# Patient Record
Sex: FEMALE | Race: WHITE | NOT HISPANIC OR LATINO | Employment: PART TIME | URBAN - NONMETROPOLITAN AREA
[De-identification: names, ages, dates, MRNs, and addresses within clinical notes are randomized per-mention and may not be internally consistent; named-entity substitution may affect disease eponyms.]

---

## 2022-04-07 ENCOUNTER — LAB (OUTPATIENT)
Dept: LAB | Facility: HOSPITAL | Age: 37
End: 2022-04-07

## 2022-04-07 ENCOUNTER — OFFICE VISIT (OUTPATIENT)
Dept: PSYCHIATRY | Facility: CLINIC | Age: 37
End: 2022-04-07

## 2022-04-07 DIAGNOSIS — F15.20 METHAMPHETAMINE USE DISORDER, SEVERE, DEPENDENCE: ICD-10-CM

## 2022-04-07 DIAGNOSIS — F11.20 OPIOID USE DISORDER, SEVERE, DEPENDENCE: Primary | ICD-10-CM

## 2022-04-07 DIAGNOSIS — F15.20 METHAMPHETAMINE USE DISORDER, SEVERE, DEPENDENCE: Primary | ICD-10-CM

## 2022-04-07 DIAGNOSIS — F11.20 OPIOID USE DISORDER, SEVERE, DEPENDENCE: ICD-10-CM

## 2022-04-07 LAB
AMPHET+METHAMPHET UR QL: NEGATIVE
AMPHETAMINES UR QL: NEGATIVE
BARBITURATES UR QL SCN: NEGATIVE
BENZODIAZ UR QL SCN: NEGATIVE
BUPRENORPHINE SERPL-MCNC: NEGATIVE NG/ML
CANNABINOIDS SERPL QL: NEGATIVE
COCAINE UR QL: NEGATIVE
METHADONE UR QL SCN: POSITIVE
OPIATES UR QL: NEGATIVE
OXYCODONE UR QL SCN: NEGATIVE
PCP UR QL SCN: NEGATIVE
PROPOXYPH UR QL: NEGATIVE
TRICYCLICS UR QL SCN: NEGATIVE

## 2022-04-07 PROCEDURE — G0480 DRUG TEST DEF 1-7 CLASSES: HCPCS

## 2022-04-07 PROCEDURE — 80307 DRUG TEST PRSMV CHEM ANLYZR: CPT

## 2022-04-07 PROCEDURE — 80306 DRUG TEST PRSMV INSTRMNT: CPT

## 2022-04-08 LAB — ETHANOL UR-MCNC: NEGATIVE %

## 2022-04-11 ENCOUNTER — TELEPHONE (OUTPATIENT)
Dept: PSYCHIATRY | Facility: CLINIC | Age: 37
End: 2022-04-11

## 2022-04-11 ENCOUNTER — OFFICE VISIT (OUTPATIENT)
Dept: PSYCHIATRY | Facility: HOSPITAL | Age: 37
End: 2022-04-11

## 2022-04-11 DIAGNOSIS — F15.20 METHAMPHETAMINE USE DISORDER, SEVERE, DEPENDENCE: ICD-10-CM

## 2022-04-11 DIAGNOSIS — F11.20 OPIOID USE DISORDER, SEVERE, DEPENDENCE: Primary | ICD-10-CM

## 2022-04-11 PROCEDURE — H0015 ALCOHOL AND/OR DRUG SERVICES: HCPCS | Performed by: NURSE PRACTITIONER

## 2022-04-11 NOTE — PROGRESS NOTES
"  IDENTIFYING INFORMATION:   Ada Vogt, is a 37 y.o. female presents today for an initial IOP assessment and initial with SAM Panda at [facility]. Pt currently resides in [place], [place] and lives with a friend and friend's brother.  Pt currently works part time at [employer] in [facility] and graduated high school and some college level of education.  Pt is ordered for IOP tx by her DCBS worker Dawson Carlee out of [facility]. Patient states motivation for treatment is “get my baby back” and start better life. Patient during initial assessment reported that her sober support system is her sponsor, Mari.     Name of PCP: Patient reports Abbey Kamara (patient reports has not visited in a \"long time\").   Referral source: Patient reports DCBS worker Dawson Carlee out of [facility].     HPI, ONSET, DURATION, COURSE:    Assessment completed 4/7/2022  Patient in office for CD-IOP initial assessment on 4/7/2022. Patient reports that she was referred for services by her DCBS worker, Dawson Carlee, who is out of [facility]. Patient reports that DCBS got involved 11/10/2021. Patient reports that initially, her ex-sister in law called to DCBS due to a relapse (patient stated had never stopped) and they charged her with Dependency. Patient states DCBS placed her with [facility] in [facility] and she started with this program in Mid-November. Patient states she completed 12 classes of IOP at [facility]. Patient stated that they kicked her out on 3/10/2022, but patient states she does not know why. Patient stated a friend also called DCBS on her when she relapsed.     Patient states she is currently staying with a friend in [place]. Patient states that it is just the two of them, and her friend’s brother. Patient stated there is no kids in the home. Patient stated that she has one child (age 1 year old) who is currently staying with a cousin. Patient stated she has " supervised visits. Patient states child is well taken care of. Patient states she is working at [employer] in [facility]. Patient states she works part-time. Patient states she has an  license. Patient stated a friend is helping with transportation (clinician discussed transportation options with patient during assessment). Patient stated she graduated high school and has some college. Patient states motivation for treatment is “get my baby back” and start better life. Patient during initial assessment reported that her sober support system is her sponsor, Mari.     In discussing previous treatment history, patient reports she was going to [facility] from mid-November until 3/10/2022 when they kicked her out of IOP program. Patient reports she also did counseling at [facility]. Patient denied any other counseling. Patient denied history of mental health hospitalizations. Patient reports on 3/25/2022, she started [facility] in [place] where she is visiting the methadone clinic. Patient reports she is prescribed 20 mg of methadone per day. Patient reports she will be starting at [facility] on 2022 for individual counseling. Patient during assessment reports she is actively going to CR meetings.     Patient during assessment denied medical conditions. Patient during assessment denied history of seizures. Patient reports the only medication she is on currently is Methadone. Patient reports she was prescribed Wellbutrin at [facility], but patient states she did not start this medication. Patient reports she has prescription but has not picked it up yet. Clinician recommended to patient discussing medications with doctor or nurse practitioner.     Patient during assessment rated her depression as a 6-7 on a 1:10 scale (1-low), and stated she has felt “pretty depressed but couldn’t cross that line”. Patient during assessment rated her anxiety as an 8-9 on a 1:10 scale (1-low). Patient during  assessment answered yes to feelings of hopelessness and rated this as a 6 on a 1:10 scale (1-low). Patient reports sleep is “poor but excessive” and stated that it is “not restful” and described having “vivid dreams”. Patient reports appetite is “way better” and reports she is overeating.     Patient during assessment denied current suicidal thoughts. Patient during assessment denied current plan or intent to hurt self. Patient during assessment denied history of suicidal thoughts. Patient during assessment denied history of plan or intent to hurt self. Patient during assessment denied history of suicide attempts or self-harm.     Patient during assessment denied current or history of homicidal thoughts or plan or intent to hurt others. Patient then stated she wanted to punch her ex-roommate when she called CPS, but patient denied current plan or intent to hurt roommate or anyone else. Patient during assessment denied history of violence.     Patient during assessment denied history of hallucinations, but stated she has “vivid dreams”.     Patient during assessment denied history of trauma or abuse.     In discussing legal history, patient reports at age 23, she was charged with Hindering. Patient also reports she was charged with Dependency in November 2021 and has active DCBS case.     Patient during assessment discussed substance use history. Patient reports history of nicotine use. Patient reports age at first use was age 24. Patient reports she currently smokes 1 pack per day.     Patient reports she started using meth as age 25. Patient reports route of ingestion was first smoking then snorting. Patient reports she started using “sporadically”, then progressed to regular use. Patient reports she has used meth for 12 years. Patient reports she has used daily from March 2021 until two weeks ago. Patient reports last meth use was two weeks ago.     Patient reports history of marijuana use. Patient reports age at  first use of marijuana was age 25. Patient reports she smoked daily for a year. Patient reports last marijuana use was 7 years ago.     Patient reports history of pain pill use (Hydrocodone and Percocet). Patient reports she was first using for pain relief and then she became addicted. Patient reports route of ingestion was snorting. Patient reports she used for 12 years, with last use being two weeks ago. Patient reports she was using 5 Percocet 30 per day.     Patient reports history of alcohol use. Patient reports age at first use was age 20. Patient reports she drinks alcohol on weekends, and reports she drinks around a fifth weekly. Patient reports last alcohol use was 3 months ago.     Patient reports she is currently prescribed 20 mg of methadone per day. Patient reports she receives daily doses. Patient reports last use was date of session.     Patient during assessment discussed that her child’s father passed away in July 2021 and this made substance use worse, and patient described using meth to numb the pain.     Patient during assessment denied active withdrawal symptoms. Patient reports she did have withdrawal symptoms a couple of weeks ago. When asked about current cravings, patient stated she has not had any since starting methadone, but reports she does think about it occasionally. Patient reports two weeks is the longest she has been completely clean.     Substance use disorder diagnostic criteria verbally reviewed with patient during assessment. Patient met 10 of 11 criteria for methamphetamine use disorder, and 6 of 11 criteria for opioid use disorder based on her self-report. Patient met 0 of 11 criteria for cannabis use disorder based on her self-report. Therefore, diagnoses of Methamphetamine Use Disorder, Severe, Dependence and Opioid Use Disorder, Severe, Dependence listed.     Patient during assessment reports family history of substance use. Patient reports her grandfather on mother’s side  of family was an alcoholic and reports that her brother (who is currently in halfway) has substance use history.     Patient reports family history of mental health with patient reporting “something mentally wrong” with her mother, but patient stated she did not know what this was.     Confidentiality and reporting requirements verbally explained to patient during assessment and patient verbally agreed.     Safety plan of report to police or hospital, or call 911 if feeling unsafe, if having suicidal or homicidal thoughts, or if in emergency need of medications verbally reviewed with patient during assessment and suicide prevention hotline number verbally provided to patient during assessment, patient during assessment verbally agreed to safety plan.     Patient discussed case management needs for housing, clinician emailed  Adali at [facility].     Patient agreed for CD-IOP start date of Monday 4/11/2022.        Previous Psychiatric History    History of prior treatment or hospitalization: In discussing previous treatment history, patient reports she was going to [facility] from mid-November until 3/10/2022 when they kicked her out of IOP program. Patient reports she also did counseling at [facility]. Patient denied any other counseling. Patient denied history of mental health hospitalizations. Patient reports on 3/25/2022, she started [facility] in [place] where she is visiting the methadone clinic. Patient reports she is prescribed 20 mg of methadone per day. Patient reports she will be starting at [facility] on 4/11/2022 for individual counseling. Patient during assessment reports she is actively going to CR meetings.     History of use of psychotropics: Patient reports the only medication she is on currently is Methadone. Patient reports she was prescribed Wellbutrin at [facility], but patient states she did not start this medication. Patient reports she has prescription but  has not picked it up yet. Clinician recommended to patient discussing medications with doctor or nurse practitioner.     History of suicide attempts:  Patient during assessment denied history of suicide attempts or self-harm.     History of violence: Patient during assessment denied current or history of homicidal thoughts or plan or intent to hurt others. Patient then stated she wanted to punch her ex-roommate when she called CPS, but patient denied current plan or intent to hurt roommate or anyone else. Patient during assessment denied history of violence.     Family Psychiatric History:    Family history is significant for psychiatric issues: Patient reports family history of mental health with patient reporting “something mentally wrong” with her mother, but patient stated she did not know what this was.     Family history is significant for substance abuse issues:  Patient during assessment reports family history of substance use. Patient reports her grandfather on mother’s side of family was an alcoholic and reports that her brother (who is currently in assisted) has substance use history.     Life Events/Trauma History  Patient during assessment denied history of trauma or abuse.     Medical History    I have reviewed this patient's past medical history.    Are there any significant health issues (current or past): Patient during assessment denied medical conditions.     History of seizures: Patient during assessment denied history of seizures.    No family history on file.    Current Medications:   No current outpatient medications on file.     No current facility-administered medications for this visit.       Relational History    Difficulty getting along with peers: no  Difficulty making new friendships: no  Difficulty maintaining friendships: no  Close with family members: no    Legal History  In discussing legal history, patient reports at age 23, she was charged with Hindering. Patient also reports she was  charged with Dependency in November 2021 and has active DCBS case.       SUBSTANCE ABUSE HISTORY:  Patient during assessment discussed substance use history. Patient reports history of nicotine use. Patient reports age at first use was age 24. Patient reports she currently smokes 1 pack per day.     Patient reports she started using meth as age 25. Patient reports route of ingestion was first smoking then snorting. Patient reports she started using “sporadically”, then progressed to regular use. Patient reports she has used meth for 12 years. Patient reports she has used daily from March 2021 until two weeks ago. Patient reports last meth use was two weeks ago.     Patient reports history of marijuana use. Patient reports age at first use of marijuana was age 25. Patient reports she smoked daily for a year. Patient reports last marijuana use was 7 years ago.     Patient reports history of pain pill use (Hydrocodone and Percocet). Patient reports she was first using for pain relief and then she became addicted. Patient reports route of ingestion was snorting. Patient reports she used for 12 years, with last use being two weeks ago. Patient reports she was using 5 Percocet 30 per day.     Patient reports history of alcohol use. Patient reports age at first use was age 20. Patient reports she drinks alcohol on weekends, and reports she drinks around a fifth weekly. Patient reports last alcohol use was 3 months ago.     Patient reports she is currently prescribed 20 mg of methadone per day. Patient reports she receives daily doses. Patient reports last use was date of session.     Patient during assessment discussed that her child’s father passed away in July 2021 and this made substance use worse, and patient described using meth to numb the pain.     Patient during assessment denied active withdrawal symptoms. Patient reports she did have withdrawal symptoms a couple of weeks ago. When asked about current cravings,  patient stated she has not had any since starting methadone, but reports she does think about it occasionally. Patient reports two weeks is the longest she has been completely clean.    Substance use disorder diagnostic criteria verbally reviewed with patient during assessment. Patient met 10 of 11 criteria for methamphetamine use disorder, and 6 of 11 criteria for opioid use disorder based on her self-report. Patient met 0 of 11 criteria for cannabis use disorder based on her self-report. Therefore, diagnoses of Methamphetamine Use Disorder, Severe, Dependence and Opioid Use Disorder, Severe, Dependence listed.       Substance Present Use Age 1st use Route Amount   (How Much) Frequency  (How Often) How Long at this Rate Last use   Nicotine Y Patient reports age 24  Patient reports 1 pack per day.    Patient reports date of assessment.    Alcohol N Patient reports age 20.  Patient reports 1/5 weekly.  Patient reports on weekends.   Patient reports 3 months ago.    Marijuana N Patient reports age 25.   Patient reports she smoked daily for a year.   Patient reports 7 years ago.    Benzos:  (type)          Neurontin          Pain Pills:  (type): Hydrocode and Percocet N Patient reports age 25.  Patient reports snorting.   Patient reports daily use, patient reports using 5 percocet 30 per day.  Patient reports she used for 12 years.  Patient reports two weeks ago.    Cocaine          Meth N Patient reports age 25.  Patient reports smoking then snorting.   Patient reports daily.   Patient reports two weeks ago.    Heroin          Methadone Y    Patient reports she is prescribed 20 mg per day.      Suboxone          Synthetics or other:              History of DT's: Unknown to clinician.                     History of Seizures: . Patient during assessment denied history of seizures.     WITHDRAWAL SYMPTOMS: Patient during assessment denied active withdrawal symptoms. Patient reports she did have withdrawal symptoms a  couple of weeks ago.       Cravings: When asked about current cravings, patient stated she has not had any since starting methadone, but reports she does think about it occasionally.       Personal Assessment 0-10 Scale (10 worst)    Anxiety: Patient during assessment rated her anxiety as an 8-9 on a 1:10 scale (1-low).     Depression: Patient during assessment rated her depression as a 6-7 on a 1:10 scale (1-low), and stated she has felt “pretty depressed but couldn’t cross that line”.     Patient adamantly and convincingly denies current suicidal or homicidal ideation or perceptual disturbance. Yes: Patient during assessment denied current suicidal thoughts. Patient during assessment denied current plan or intent to hurt self. Patient during assessment denied history of suicidal thoughts. Patient during assessment denied history of plan or intent to hurt self. Patient during assessment denied history of suicide attempts or self-harm.     Patient during assessment denied current or history of homicidal thoughts or plan or intent to hurt others. Patient then stated she wanted to punch her ex-roommate when she called CPS, but patient denied current plan or intent to hurt roommate or anyone else. Patient during assessment denied history of violence.     Patient during assessment denied history of hallucinations, but stated she has “vivid dreams”.       Urine drug screen today was presumptively positive for: Methamphetamine.     MSE:     Affect: Appropriate  Cooperation: Cooperative  Delusion: None  Eye Contact: Good  Hallucinations: None  Homicidal: None  Suicidal: None  Cognition: Good  Memory: Intact  Orientation: Person, Place, Time and Situation  Psychomotor Behaviors: Appropriate  Speech: Normal  Though Content: Normal  Thought Progress: Linear  Hopelessness: Patient during assessment answered yes to feelings of hopelessness and rated this as a 6 on a 1:10 scale (1-low).   Reliability: fair  Insight:  Fair  Judgement: Fair  Impulse Control: Fair  Physical/Medical Issues: Patient during assessment denied medical conditions.     Patient resources/assets:  Employed and participated in Self-Help Groups    ASAM Dimensions:  I.    Intoxication/Withdrawal:  0  (Patient during initial assessment denied active withdrawal symptoms).  II.   Medical Conditions/Complications:  0 (Patient during initial assessment denied medical conditions).  III.  Behavioral/Emotional/Cognitive: 1 (Due to patient report of depression and anxiety during initial assessment).  IV.  Readiness to Change: 1 (Patient is ordered for treatment by DCBS, but patient identified motivation for change during initial assessment).  V.   Relapse Risk: 2 (Due to patient report of recent substance use and report of two weeks longest being clean during initial assessment).  VI:  Recovery Environment: 1 (Patient reports she lives with a friend but reporting needs housing assistance and patient reports friend helping with transportation during initial assessment).  Total ASAM Score = 05     BASELINE SCORES 04/07/2022       KATHY-7   (16)                  PHQ-9   (20)       Impression/Formulation:    VISIT DIAGNOSIS:     ICD-10-CM ICD-9-CM   1. Methamphetamine use disorder, severe, dependence (HCC)  F15.20 304.40   2. Opioid use disorder, severe, dependence (HCC)  F11.20 304.00        Patient appeared alert and oriented. Patient states motivation for treatment is “get my baby back” and start better life. Patient during initial assessment reported that her sober support system is her sponsor, Mari.       Plan:  Confidentiality and reporting requirements verbally explained to patient during assessment and patient verbally agreed.     Safety plan of report to police or hospital, or call 911 if feeling unsafe, if having suicidal or homicidal thoughts, or if in emergency need of medications verbally reviewed with patient during assessment and suicide prevention  hotline number verbally provided to patient during assessment, patient during assessment verbally agreed to safety plan.     Patient discussed case management needs for housing, clinician emailed  Adali at [facility].     Patient agreed for CD-IOP start date of Monday 4/11/2022.    -DARREN Panda, CSW

## 2022-04-12 ENCOUNTER — OFFICE VISIT (OUTPATIENT)
Dept: PSYCHIATRY | Facility: CLINIC | Age: 37
End: 2022-04-12

## 2022-04-12 ENCOUNTER — LAB (OUTPATIENT)
Dept: LAB | Facility: HOSPITAL | Age: 37
End: 2022-04-12

## 2022-04-12 VITALS
WEIGHT: 93.6 LBS | HEART RATE: 87 BPM | HEIGHT: 61 IN | SYSTOLIC BLOOD PRESSURE: 102 MMHG | BODY MASS INDEX: 17.67 KG/M2 | DIASTOLIC BLOOD PRESSURE: 78 MMHG

## 2022-04-12 DIAGNOSIS — F11.21 OPIOID USE DISORDER, SEVERE, IN EARLY REMISSION, ON MAINTENANCE THERAPY, DEPENDENCE (HCC): Primary | ICD-10-CM

## 2022-04-12 DIAGNOSIS — F41.1 GENERALIZED ANXIETY DISORDER: ICD-10-CM

## 2022-04-12 DIAGNOSIS — F11.20 OPIOID USE DISORDER, SEVERE, DEPENDENCE: ICD-10-CM

## 2022-04-12 DIAGNOSIS — F15.21 METHAMPHETAMINE USE DISORDER, SEVERE, IN EARLY REMISSION: ICD-10-CM

## 2022-04-12 DIAGNOSIS — F32.9 REACTIVE DEPRESSION: ICD-10-CM

## 2022-04-12 DIAGNOSIS — F15.20 METHAMPHETAMINE USE DISORDER, SEVERE, DEPENDENCE: ICD-10-CM

## 2022-04-12 PROCEDURE — G0480 DRUG TEST DEF 1-7 CLASSES: HCPCS

## 2022-04-12 PROCEDURE — 90792 PSYCH DIAG EVAL W/MED SRVCS: CPT | Performed by: NURSE PRACTITIONER

## 2022-04-12 PROCEDURE — 80307 DRUG TEST PRSMV CHEM ANLYZR: CPT

## 2022-04-12 RX ORDER — BUPROPION HYDROCHLORIDE 100 MG/1
100 TABLET ORAL 2 TIMES DAILY
COMMUNITY
Start: 2022-04-08 | End: 2022-05-10 | Stop reason: SDUPTHER

## 2022-04-12 RX ORDER — ETONOGESTREL 68 MG/1
IMPLANT SUBCUTANEOUS
COMMUNITY
Start: 2022-02-04

## 2022-04-12 RX ORDER — METHADONE HYDROCHLORIDE 10 MG/1
70 TABLET ORAL DAILY
COMMUNITY

## 2022-04-12 NOTE — PROGRESS NOTES
New Patient Office Visit        Patient Name: Ada Vogt  : 1985   MRN: [id number]     Referring Provider: Dawson Bejarano per DCBS    Chief Complaint: Substance use    History of Present Illness:   Ada Vogt is a 37 y.o. female who is here today for initial evaluation with provider related to substance use and to discuss IOP.  Use initially started at age 20 with social EtOH intake.  Added THC intermittently around age 25.  Started using prescription pain pills recreationally at age 25 as well as methamphetamine.  Both of these were used daily up until 3/24/2022. Started on methadone at that time and is doing well. Was using meth to cope with opiate cravings. Cravings for both have been well controlled since starting methadone and denies recurrence of use. Typically use triggered by stress, anxiety, and issues with exfather-in-law. DSM-V criteria for for methamphetamine and opiate use disorders reviewed with patient.  Meets criteria for opiate use disorder severe, and methamphetamine use disorder severe.    Has been in IOP in the past but did not like format and felt it was not helpful.  No other reported rehab, detox in the past.  Currently in CBT at [facility].  Was prescribed Wellbutrin 100 mg half tab p.o. twice daily x2 weeks then increase to 1 tab p.o. twice daily by APRN at previous IOP.  Has not started but plans to today. Has been diagnosed with KATHY in the past.  Struggles daily with excessive worry, intermittent panic attacks, feelings of guilt/worthlessness, and sleeping too much.  Feels most of this stems from active DCBS case and guilt over addiction issues. No prior psychiatric hospitalizations.  Denies other past medical history. Denies SI/HI, AVH.    Currently resides in [place] with a friend and friend's brother.  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 Graduated high school and some college level of education. Has open DCBS case.  Cousin currently has custody of her almost  2-year-old daughter.  Patient gets supervised visitation.  Has upcoming court date 2022. Motivation for treatment is “get my baby back” and start better life. Sober support system is her sponsor, Mari as well as sober friends, and cousin.  Has been contacted by case management to assist with housing issues.    Triggers: Stress, anxiety, issues with exfather-in-law    Cravings: Denies any cravings    Relapse Prevention: Continue MOUD, IOP    Urine Drug Screen (today's visit) discussed: Pending    UDS Confirmation: 2022 + methadone as expected    RITO (PDMP) Reviewed for Current/Active Medications: No active medications (RITO [id number])    Past Surgical History:  Past Surgical History:   Procedure Laterality Date   •  SECTION         Problem List:  There is no problem list on file for this patient.      Allergy:   Allergies   Allergen Reactions   • Amoxicillin Hives   • Penicillins Hives        Current Medications:   Current Outpatient Medications   Medication Sig Dispense Refill   • buPROPion (WELLBUTRIN) 100 MG tablet Take 100 mg by mouth 2 (Two) Times a Day.     • methadone (DOLOPHINE) 10 MG tablet Take 20 mg by mouth Daily,     • Nexplanon 68 MG implant subdermal implant        No current facility-administered medications for this visit.       Past Medical History:  Past Medical History:   Diagnosis Date   • Anxiety    • Depression    • Panic disorder        Social History:  Social History     Socioeconomic History   • Marital status: Single   Tobacco Use   • Smoking status: Current Every Day Smoker     Packs/day: 1.00     Types: Cigarettes   Vaping Use   • Vaping Use: Some days   • Substances: Nicotine, Flavoring   • Devices: Disposable   Substance and Sexual Activity   • Alcohol use: Not Currently   • Drug use: Not Currently     Types: Methamphetamines, Codeine     Comment: States she has not used in 3 weeks       Family History:  Family History   Problem Relation Age of Onset   •  Alcohol abuse Father    • Drug abuse Brother    • Alcohol abuse Brother          Subjective      Review of Systems:   Review of Systems   Constitutional: Positive for fatigue. Negative for chills and fever.   Respiratory: Negative for shortness of breath.    Cardiovascular: Negative for chest pain.   Gastrointestinal: Negative for abdominal pain.   Skin: Negative for skin lesions.   Neurological: Negative for seizures and confusion.   Psychiatric/Behavioral: Positive for sleep disturbance, depressed mood and stress. Negative for hallucinations and suicidal ideas. The patient is nervous/anxious.        PHQ-9 Score:   13    KATHY-7 Score:   Feeling nervous, anxious or on edge: More than half the days  Not being able to stop or control worrying: Nearly every day  Worrying too much about different things: Nearly every day  Trouble Relaxing: More than half the days  Being so restless that it is hard to sit still: Several days  Feeling afraid as if something awful might happen: Several days  Becoming easily annoyed or irritable: More than half the days  KATHY 7 Total Score: 14  If you checked any problems, how difficult have these problems made it for you to do your work, take care of things at home, or get along with other people: Somewhat difficult    Patient History:   The following portions of the patient's history were reviewed and updated as appropriate: allergies, current medications, past family history, past medical history, past social history, past surgical history and problem list.     Social:  Social History     Socioeconomic History   • Marital status: Single   Tobacco Use   • Smoking status: Current Every Day Smoker     Packs/day: 1.00     Types: Cigarettes   Vaping Use   • Vaping Use: Some days   • Substances: Nicotine, Flavoring   • Devices: Disposable   Substance and Sexual Activity   • Alcohol use: Not Currently   • Drug use: Not Currently     Types: Methamphetamines, Codeine     Comment: States she has not  "used in 3 weeks       Medications:     Current Outpatient Medications:   •  buPROPion (WELLBUTRIN) 100 MG tablet, Take 100 mg by mouth 2 (Two) Times a Day., Disp: , Rfl:   •  methadone (DOLOPHINE) 10 MG tablet, Take 20 mg by mouth Daily,, Disp: , Rfl:   •  Nexplanon 68 MG implant subdermal implant, , Disp: , Rfl:     Objective     Physical Exam:  Physical Exam    Vital Signs:   Vitals:    04/12/22 1056   BP: 102/78   Pulse: 87   Weight: 42.5 kg (93 lb 9.6 oz)   Height: 154.9 cm (61\")     Body mass index is 17.69 kg/m².     Mental Status Exam:   Hygiene:   good  Cooperation:  Cooperative  Eye Contact:  Good  Psychomotor Behavior:  Restless  Affect:  Full range  Mood: anxious  Speech:  Normal  Thought Process:  Goal directed  Thought Content:  Normal  Suicidal:  None  Homicidal:  None  Hallucinations:  None  Delusion:  None  Memory:  Intact  Orientation:  Grossly intact  Reliability:  good  Insight:  Good  Judgement:  Good  Impulse Control:  Good    Assessment / Plan      Assessment/Plan    -Continue MOUD at current methadone clinic  -Completed IOP screener and starts this week  -Has Narcan sample and OD education provided  -Will start bupropion 50 mg p.o. twice daily x2 weeks and then increase to 100 mg p.o. twice daily as previously prescribed  -Discussed AE of medication and when to RTC/call  -Discouraged use of any illicit substances and tobacco     Visit Diagnoses     Opioid use disorder, severe, in early remission, on maintenance therapy, dependence (HCC)    -  Primary    Methamphetamine use disorder, severe, in early remission (HCC)        Generalized anxiety disorder        Relevant Medications    buPROPion (WELLBUTRIN) 100 MG tablet    Reactive depression        Relevant Medications    buPROPion (WELLBUTRIN) 100 MG tablet      Diagnoses       Codes Comments    Opioid use disorder, severe, in early remission, on maintenance therapy, dependence (HCC)    -  Primary ICD-10-CM: F11.21  ICD-9-CM: 304.03     " Methamphetamine use disorder, severe, in early remission (HCC)     ICD-10-CM: F15.21  ICD-9-CM: 304.43     Generalized anxiety disorder     ICD-10-CM: F41.1  ICD-9-CM: 300.02     Reactive depression     ICD-10-CM: F32.9  ICD-9-CM: 300.4           Visit Diagnoses:    ICD-10-CM ICD-9-CM   1. Opioid use disorder, severe, in early remission, on maintenance therapy, dependence (HCC)  F11.21 304.03   2. Methamphetamine use disorder, severe, in early remission (HCC)  F15.21 304.43   3. Generalized anxiety disorder  F41.1 300.02   4. Reactive depression  F32.9 300.4       PLAN:  1. Safety: No acute safety concerns  2. Risk Assessment: Risk of self-harm acutely is low. Risk of self-harm chronically is also low, but could be further elevated in the event of treatment noncompliance and/or AODA.    TREATMENT PLAN/GOALS: Continue supportive psychotherapy efforts and medications as indicated. Treatment and medication options discussed during today's visit. Patient acknowledged and verbally consented to continue with current treatment plan and was educated on the importance of compliance with treatment and follow-up appointments.      MEDICATION ISSUES:  RITO reviewed as expected.  Discussed medication options and treatment plan of prescribed medication as well as the risks, benefits, and side effects including potential falls, possible impaired driving and metabolic adversities among others. Patient is agreeable to call the office with any worsening of symptoms or onset of side effects. Patient is agreeable to call 911 or go to the nearest ER should he/she begin having SI/HI. No medication side effects or related complaints today.       TOBACCO USE:  Current every day smoker 3-10 mintues spent counseling Not agreeable to stopping    I advised Ada Vogt of the risks of tobacco use.     MEDS ORDERED DURING VISIT:  No orders of the defined types were placed in this encounter.      Return in about 4 weeks (around 5/10/2022)  for Follow Up Medication.           This document has been electronically signed by YULI Lea  April 12, 2022 12:25 EDT      Part of this note may be an electronic transcription/translation of spoken language to printed text using the Dragon Dictation System.

## 2022-04-13 ENCOUNTER — OFFICE VISIT (OUTPATIENT)
Dept: PSYCHIATRY | Facility: HOSPITAL | Age: 37
End: 2022-04-13

## 2022-04-13 DIAGNOSIS — F11.20 OPIOID USE DISORDER, SEVERE, DEPENDENCE: Primary | ICD-10-CM

## 2022-04-13 DIAGNOSIS — F15.20 METHAMPHETAMINE USE DISORDER, SEVERE, DEPENDENCE: ICD-10-CM

## 2022-04-13 LAB
EDDP/CREAT UR: 7122 NG/MG CREAT
ETHANOL UR-MCNC: NEGATIVE %
LEVEL OF DETECTION:: NORMAL
METHADONE UR QL CFM: NORMAL
METHADONE/CREAT UR: 7121 NG/MG CREAT

## 2022-04-13 PROCEDURE — H0015 ALCOHOL AND/OR DRUG SERVICES: HCPCS | Performed by: NURSE PRACTITIONER

## 2022-04-14 ENCOUNTER — OFFICE VISIT (OUTPATIENT)
Dept: PSYCHIATRY | Facility: HOSPITAL | Age: 37
End: 2022-04-14

## 2022-04-14 DIAGNOSIS — F15.20 METHAMPHETAMINE USE DISORDER, SEVERE, DEPENDENCE: ICD-10-CM

## 2022-04-14 DIAGNOSIS — F11.20 OPIOID USE DISORDER, SEVERE, DEPENDENCE: Primary | ICD-10-CM

## 2022-04-14 PROCEDURE — H0015 ALCOHOL AND/OR DRUG SERVICES: HCPCS | Performed by: NURSE PRACTITIONER

## 2022-04-18 ENCOUNTER — OFFICE VISIT (OUTPATIENT)
Dept: PSYCHIATRY | Facility: HOSPITAL | Age: 37
End: 2022-04-18

## 2022-04-18 DIAGNOSIS — F15.20 METHAMPHETAMINE USE DISORDER, SEVERE, DEPENDENCE: ICD-10-CM

## 2022-04-18 DIAGNOSIS — F11.20 OPIOID USE DISORDER, SEVERE, DEPENDENCE: Primary | ICD-10-CM

## 2022-04-18 LAB
EDDP/CREAT UR: 6757 NG/MG CREAT
LEVEL OF DETECTION:: NORMAL
METHADONE UR QL CFM: NORMAL
METHADONE/CREAT UR: 1073 NG/MG CREAT

## 2022-04-18 PROCEDURE — H0015 ALCOHOL AND/OR DRUG SERVICES: HCPCS | Performed by: NURSE PRACTITIONER

## 2022-04-20 ENCOUNTER — OFFICE VISIT (OUTPATIENT)
Dept: PSYCHIATRY | Facility: HOSPITAL | Age: 37
End: 2022-04-20

## 2022-04-20 ENCOUNTER — DOCUMENTATION (OUTPATIENT)
Dept: PSYCHIATRY | Facility: HOSPITAL | Age: 37
End: 2022-04-20

## 2022-04-20 DIAGNOSIS — F11.20 OPIOID USE DISORDER, SEVERE, DEPENDENCE: Primary | ICD-10-CM

## 2022-04-20 DIAGNOSIS — F15.20 METHAMPHETAMINE USE DISORDER, SEVERE, DEPENDENCE: ICD-10-CM

## 2022-04-20 PROCEDURE — H0015 ALCOHOL AND/OR DRUG SERVICES: HCPCS | Performed by: NURSE PRACTITIONER

## 2022-04-20 NOTE — PROGRESS NOTES
[facility]  [address], [address]  [place] [address]    Intensive Outpatient Program for Chemical Dependence (CD IOP)      Individualized Treatment Plan    DATE: Verbally Completed on 4/11/2022          Long Term Goal:  Ada Vogt will establish a sustained recovery and will maintain total abstinence from mind-altering substances other than what is prescribed and/or approved by the attending physician. Pt will learn, practice, and utilize behavioral and cognitive coping skills to help maintain sobriety.    Patient Care Needs:  Ada Vogt presents with Methamphetamine Use Disorder, Severe, Dependence and Opioid Use Disorder, Severe, Dependence  and is at high risk for relapse without continued treatment.  Pt has a history of using drugs/alcohol until intoxicated or passed out and has been unable to stop or cut down use of alcohol/drugs once starting, despite verbalized desire to do so, and the negative consequences from continued use.  Pt's use has negatively impacted social, occupational, and/or physical functioning.    Objectives:      1.  Patient will participate in a medical evaluation to assess the effects of chemical dependence and will cooperate with an evaluation by the attending psychiatrist or psychiatric nurse practitioner for psychotropic medication if appropriate.    2.  Patient will adhere to the IOP group rules and guidelines.    3.  Patient will attend group sessions as scheduled.  Patient will notify therapist and support staff of any absences or tardies.  Patient will provide a valid and verifiable excuse for absences to be considered excused.    4.  Patient will participate in random drug and alcohol screenings and will exhibit negative results on said screenings.    5.  Patient will identify high risk situations, people, and places to avoid or manage in order to maintain sobriety.    6.  Patient will participate in group discussions by giving  and receiving feedback appropriately.    7.  Patient will develop a positive network of support by attending recovery groups or other spiritual fellowships each week outside of IOP group and by exploring/obtaining/maintaining sponsorship.    8.  Patient will identify, practice, and implement at least 5 healthy coping strategies to manage difficult emotions while remaining abstinent.    9.  Patient will develop relapse prevention skills and be able to identify and share them with the group in the form of a relapse prevention plan.    10.  Patient will develop a personal recovery plan and share it with the group prior to discharge.    11.  Patient will offer family participation in family education groups, if appropriate.    12.  Patient will exhibit increased motivation to change as assessed by observable behaviors in conjunction with the ASAM assessment tool.    13.  Patient will utilize healthy coping skills to manage depressive and anxious symptoms without using alcohol or other mind-altering substances and will exhibit decreased score on the PHQ-9 and KATHY-7.    Interventions:  Patient will attend biweekly appointments with the attending psychiatrist or psychiatric nurse practitioner for medication evaluation and management unless scheduled otherwise.  Patient will be referred to a medical provider for a physical examination if he/she is not already established with a medical provider.  Patient will comply with recommendations and appointments with his/her medical treatment team, including medication management.  Therapist will introduce and review IOP group rules, and patient will be provided a hard copy of the group rules upon entering the program.  IOP group sessions will be offered 3-4 times per week, Mondays, Tuesdays, Wednesdays and Thursdays during Phase I of treatment, with the exception of some federal holidays. Patient will be provided with random and regular drug and alcohol screenings. Therapist will  utilize motivational interviewing techniques to assist patient with exploring and resolving ambivalence associated with commitment to change behaviors related to substance use and addiction.  Therapist will utilize behavioral and cognitive techniques to assist patient with identifying and recognizing patterns of irrational beliefs that contribute to patient's risk for continued substance abuse and relapse.  Therapist will provide psychoeducation to assist patient in increasing knowledge on the disease concept and the effects of chemical dependence.  Therapist will provide and utilize evidenced-based recovery literature to assist patient in identifying healthier coping strategies.  Therapist will assist patient in developing a relapse prevention plan and a personal recovery plan.  Therapist will assist patient with exploring self-help strategies and beginning work on the 12 Steps.  Therapist will encourage patient to explore, obtain, or maintain sponsorship and/or interpersonal connection with [facility].  Therapist will offer family education groups, if appropriate, and will encourage patient to invite family member(s) to participate. Treatment team members will provide patient with progress reports as needed.    Team Members:  Patient - Ada Vogt- Verbally Agreed on 4/11/2022  Medical Provider - YULI Lea  [facility] Licensed Therapist - Amor Tabares [facility], [facility] Director - Dimple Null, [facility]  Support Staff

## 2022-04-20 NOTE — PROGRESS NOTES
NAME: Ada Vogt  DATE: 04/11/2022     IOP GROUP   [id number]    Number of participants: 9  IOP GROUP NOTE     DATA:     3 hour IOP group therapy session (Check-ins, Coping Skills, Relapse Prevention)     Check Ins: Therapist continued facilitation of rapport building strategies between group members. Therapist asked that each patient check in with home life and recovery efforts and identify triggers, cravings, and high risk situations that arise between group sessions. Therapist provided empathy and support during group session.     Session Content/Coping Skills: Check in’s completed by group members. Introductions completed by group members. -IOP Group Rules Sheet verbally reviewed with group members. Living in Balance, Attitudes and Beliefs psychoeducational material reviewed with group members.     Response: Client attended class in person. Client participated in completion of check in form. Client reported on check in form that her anxiety was a 4, depression was a 2, and cravings was a 1 on a 0:10 scale (0-none). Client reported on check in form that she had attended CR meeting in the past 7 days and had not used any drugs or alcohol in the past 7 days and reported it has been 17 days since last drug or alcohol use. Client on check in form denied suicidal thoughts or plan or intent to hurt self and client denied homicidal thoughts or plan or intent to hurt others currently or in the past 7 days. Client answered yes to new supports in the past 7 days meeting and listed CR. Client answered no to all other questions on check in form. Client reported on check in form that goal today for recovery is “continuing sobriety”. Client participated in review of psychoeducational material and group discussion.     Personal Assessment 0-10 Scale (10 worst)    Anxiety:  4   Depression:  2   Cravings: 1     ASSESSMENT:     ..  Lab on 04/07/2022   Component Date Value Ref Range Status   • THC, Screen, Urine 04/07/2022  Negative  Negative Final   • Phencyclidine (PCP), Urine 04/07/2022 Negative  Negative Final   • Cocaine Screen, Urine 04/07/2022 Negative  Negative Final   • Methamphetamine, Ur 04/07/2022 Negative  Negative Final   • Opiate Screen 04/07/2022 Negative  Negative Final   • Amphetamine Screen, Urine 04/07/2022 Negative  Negative Final   • Benzodiazepine Screen, Urine 04/07/2022 Negative  Negative Final   • Tricyclic Antidepressants Screen 04/07/2022 Negative  Negative Final   • Methadone Screen, Urine 04/07/2022 Positive (A) Negative Final   • Barbiturates Screen, Urine 04/07/2022 Negative  Negative Final   • Oxycodone Screen, Urine 04/07/2022 Negative  Negative Final   • Propoxyphene Screen 04/07/2022 Negative  Negative Final   • Buprenorphine, Screen, Urine 04/07/2022 Negative  Negative Final   • Ethanol, Urine 04/07/2022 Negative  Cutoff=0.020 % Final   • Methadone 04/07/2022 +POSITIVE+   Final   • Methadone, Quantitative 04/07/2022 7121  ng/mg creat Final   • EDDP 04/07/2022 7122  ng/mg creat Final   • Level of Detection: 04/07/2022 Comment   Final    Testing Threshold:  50 ng/mL                                                                       '  This test was developed and its performance characteristics  determined by [facility].  It has not been cleared or approved  by the Food and Drug Administration.       Mental Status Exam  Hygiene:  good  Dress: casual  Attitude: cooperative and agreeable   Motor Activity: appropriate  Eye Contact:  good  Speech: regular rate and rhythm   Mood:  calm and cooperative  Affect:  Appropriate  Thought Processes:  Linear  Thought Content:  Normal  Suicidal Thoughts:  denies  Homicidal Thoughts:  denies  Crisis Safety Plan: yes, to come to the emergency room.  Hallucinations:  denies  Reliability: fair  Insight: fair  Judgement: fair  Impulse Control: fair    Family issues related to recovery:  No change, see previous encounters    Recovery/spiritual support group attendance:  Yes, patient reported CR meetings NA on check in form.      Sponsor: Yes   If no, are you looking?  N/A      Motivation for treatment: Patient during initial assessment stated her motivation for treatment is “get my baby back” and start better life.    Patient's Support Network Includes: Patient during initial assessment reported that her sober support system is her sponsor, Mari.    Progress toward goal: Not at goal    Prognosis: Fair with Ongoing Treatment     Self-reported number of days sober:  Patient reported 17 on check in form.     ASAM Dimensions:  I.    Intoxication/Withdrawal:  0  (Patient during initial assessment denied active withdrawal symptoms).  II.   Medical Conditions/Complications:  0 (Patient during initial assessment denied medical conditions).  III.  Behavioral/Emotional/Cognitive: 1 (Due to patient report of depression and anxiety during initial assessment).  IV.  Readiness to Change: 1 (Patient is ordered for treatment by DCBS, but patient identified motivation for change during initial assessment).  V.   Relapse Risk: 2 (Due to patient report of recent substance use and report of two weeks longest being clean during initial assessment).  VI:  Recovery Environment: 1 (Patient reports she lives with a friend but reporting needs housing assistance and patient reports friend helping with transportation during initial assessment).  Total ASAM Score = 05      BASELINE SCORES 04/07/2022       KATHY-7   (16)                  PHQ-9   (20)         Patient agreeable to adhere to medication regimen as prescribed and report any side effects. Patient will contact this office, call 911 or present to the nearest emergency room should suicidal or homicidal ideations occur.    Impression/Formulation:    ICD-10-CM ICD-9-CM   1. Opioid use disorder, severe, dependence (HCC)  F11.20 304.00   2. Methamphetamine use disorder, severe, dependence (HCC)  F15.20 304.40       CLINICAL MANUVERING/INTERVENTIONS:  Therapist utilized a person-centered approach to build rapport with group member.  Therapist implemented motivational interviewing techniques to assist client with exploring and resolving ambivalence associated with commitment to change behaviors related to substance use and addiction.  Therapist applied cognitive behavioral strategies to facilitate identification of maladaptive patterns of thinking and behavior that contribute to client’s risk for continued substance use and relapse.  Therapist employed group interaction activities to build rapport among group members, promote sobriety, and emphasize relapse prevention.  Therapist promoted safe nonjudgmental environment by providing group members with unconditional positive regard and encouraging group members to comply with group rules and guidelines. Therapist assisted group member with identifying and implementing healthier coping strategies.     PLAN:  Continue [facility] IOP Phase I   Aftercare:  [facility] Phase II  Program Assignments:  Personal recovery plan, relapse prevention plan, attendance of recovery support group meetings, exploration of sponsorship, drug/alcohol screens.     Please note that portions of this note were completed with a voice recognition program. Efforts were made to edit dictation, but occasionally words are mistranscribed.       This document signed by Amor Tabares, April 20, 2022, 09:02 EDT

## 2022-04-21 ENCOUNTER — LAB (OUTPATIENT)
Dept: LAB | Facility: HOSPITAL | Age: 37
End: 2022-04-21

## 2022-04-21 ENCOUNTER — OFFICE VISIT (OUTPATIENT)
Dept: PSYCHIATRY | Facility: HOSPITAL | Age: 37
End: 2022-04-21

## 2022-04-21 DIAGNOSIS — F15.20 METHAMPHETAMINE USE DISORDER, SEVERE, DEPENDENCE: ICD-10-CM

## 2022-04-21 DIAGNOSIS — F11.20 OPIOID USE DISORDER, SEVERE, DEPENDENCE: ICD-10-CM

## 2022-04-21 DIAGNOSIS — F15.20 METHAMPHETAMINE USE DISORDER, SEVERE, DEPENDENCE: Primary | ICD-10-CM

## 2022-04-21 PROCEDURE — 80307 DRUG TEST PRSMV CHEM ANLYZR: CPT

## 2022-04-21 PROCEDURE — G0480 DRUG TEST DEF 1-7 CLASSES: HCPCS

## 2022-04-21 PROCEDURE — H0015 ALCOHOL AND/OR DRUG SERVICES: HCPCS | Performed by: NURSE PRACTITIONER

## 2022-04-21 NOTE — PROGRESS NOTES
NAME: Ada Vogt  DATE: 04/13/2022    [facility] GROUP   [id number]    Number of participants: 9  IOP GROUP NOTE     DATA:     3 hour IOP group therapy session (Check-ins, Coping Skills, Relapse Prevention)     Check Ins: Therapist continued facilitation of rapport building strategies between group members. Therapist asked that each patient check in with home life and recovery efforts and identify triggers, cravings, and high risk situations that arise between group sessions. Therapist provided empathy and support during group session.     Session Content/Coping Skills: Group member facts ice breaker activity completed by group members. Check in’s completed by group members. Clinician explored triggers with group members. Clinician discussed with group members the importance of getting rid of paraphernalia. Clinician explored with group members protective factors versus risk factors at work. Clinician introduced relapse prevention plans to group members.       Response: Client attended class in person. Client participated in completion of check in form.   Client on check in form reported that her anxiety was a 5, depression was a 4, and cravings was a 5 on a 0:10 scale (0-none).   Client reported on check in form that she had not attended any meetings since last group.   Client reported on check in form that she had not used any drugs or alcohol since last group.   Client reported on check in form that it had been 19 days since last drug or alcohol use.   Client on check in form denied suicidal thoughts or plan or intent to hurt self currently or since last group meeting. Client on check in form denied homicidal thoughts or plan or intent to hurt others currently or since last group meeting.   Client answered no to all other questions on check in form.  Client on check in form reported goal today for recovery is “Remain sober & worry less. & find housing ”.   Client participated in group icebreaker activity and group  discussions.     Personal Assessment 0-10 Scale (10 worst)    Anxiety:  5   Depression:  4   Cravings: 5     ASSESSMENT:     ..  Lab on 04/12/2022   Component Date Value Ref Range Status   • Ethanol, Urine 04/12/2022 Negative  Cutoff=0.020 % Final   • THC, Screen, Urine 04/12/2022 Negative  Negative Final   • Phencyclidine (PCP), Urine 04/12/2022 Negative  Negative Final   • Cocaine Screen, Urine 04/12/2022 Negative  Negative Final   • Methamphetamine, Ur 04/12/2022 Negative  Negative Final   • Opiate Screen 04/12/2022 Negative  Negative Final   • Amphetamine Screen, Urine 04/12/2022 Negative  Negative Final   • Benzodiazepine Screen, Urine 04/12/2022 Negative  Negative Final   • Tricyclic Antidepressants Screen 04/12/2022 Negative  Negative Final   • Methadone Screen, Urine 04/12/2022 Positive (A) Negative Final   • Barbiturates Screen, Urine 04/12/2022 Negative  Negative Final   • Oxycodone Screen, Urine 04/12/2022 Negative  Negative Final   • Propoxyphene Screen 04/12/2022 Negative  Negative Final   • Buprenorphine, Screen, Urine 04/12/2022 Negative  Negative Final   • Methadone 04/12/2022 +POSITIVE+   Final   • Methadone, Quantitative 04/12/2022 1073  ng/mg creat Final   • EDDP 04/12/2022 6757  ng/mg creat Final   • Level of Detection: 04/12/2022 Comment   Final    Testing Threshold:  50 ng/mL                                                                       '  This test was developed and its performance characteristics  determined by [facility].  It has not been cleared or approved  by the Food and Drug Administration.   Lab on 04/07/2022   Component Date Value Ref Range Status   • THC, Screen, Urine 04/07/2022 Negative  Negative Final   • Phencyclidine (PCP), Urine 04/07/2022 Negative  Negative Final   • Cocaine Screen, Urine 04/07/2022 Negative  Negative Final   • Methamphetamine, Ur 04/07/2022 Negative  Negative Final   • Opiate Screen 04/07/2022 Negative  Negative Final   • Amphetamine Screen, Urine  04/07/2022 Negative  Negative Final   • Benzodiazepine Screen, Urine 04/07/2022 Negative  Negative Final   • Tricyclic Antidepressants Screen 04/07/2022 Negative  Negative Final   • Methadone Screen, Urine 04/07/2022 Positive (A) Negative Final   • Barbiturates Screen, Urine 04/07/2022 Negative  Negative Final   • Oxycodone Screen, Urine 04/07/2022 Negative  Negative Final   • Propoxyphene Screen 04/07/2022 Negative  Negative Final   • Buprenorphine, Screen, Urine 04/07/2022 Negative  Negative Final   • Ethanol, Urine 04/07/2022 Negative  Cutoff=0.020 % Final   • Methadone 04/07/2022 +POSITIVE+   Final   • Methadone, Quantitative 04/07/2022 7121  ng/mg creat Final   • EDDP 04/07/2022 7122  ng/mg creat Final   • Level of Detection: 04/07/2022 Comment   Final    Testing Threshold:  50 ng/mL                                                                       '  This test was developed and its performance characteristics  determined by [employer].  It has not been cleared or approved  by the Food and Drug Administration.       Mental Status Exam  Hygiene:  good  Dress: casual  Attitude: cooperative and agreeable   Motor Activity: appropriate  Eye Contact:  good  Speech: regular rate and rhythm   Mood:  calm and cooperative  Affect:  Appropriate  Thought Processes:  Linear  Thought Content:  Normal  Suicidal Thoughts:  denies  Homicidal Thoughts:  denies  Crisis Safety Plan: yes, to come to the emergency room.  Hallucinations:  denies  Reliability: fair  Insight: fair  Judgement: fair  Impulse Control: fair    Family issues related to recovery:  No change, see previous encounters    Recovery/spiritual support group attendance: No     Sponsor: Yes      Motivation for treatment: Patient during initial assessment stated her motivation for treatment is “get my baby back” and start better life.    Patient's Support Network Includes: Patient during initial assessment reported that her sober support system is her sponsor,  Mari.    Progress toward goal: Not at goal    Prognosis: Fair with Ongoing Treatment     Self-reported number of days sober: Client reported on check in form that it had been 19 days since last drug or alcohol use.     ASAM Dimensions:  I.    Intoxication/Withdrawal:  0  (Patient during initial assessment denied active withdrawal symptoms).  II.   Medical Conditions/Complications:  0 (Patient during initial assessment denied medical conditions).  III.  Behavioral/Emotional/Cognitive: 1 (Due to patient report of depression and anxiety during initial assessment).  IV.  Readiness to Change: 1 (Patient is ordered for treatment by DCBS, but patient identified motivation for change during initial assessment).  V.   Relapse Risk: 2 (Due to patient report of recent substance use and report of two weeks longest being clean during initial assessment).  VI:  Recovery Environment: 1 (Patient reports she lives with a friend but reporting needs housing assistance and patient reports friend helping with transportation during initial assessment).  Total ASAM Score = 05      BASELINE SCORES 04/07/2022       KATHY-7   (16)                  PHQ-9   (20)         Patient agreeable to adhere to medication regimen as prescribed and report any side effects. Patient will contact this office, call 911 or present to the nearest emergency room should suicidal or homicidal ideations occur.    Impression/Formulation:    ICD-10-CM ICD-9-CM   1. Opioid use disorder, severe, dependence (HCC)  F11.20 304.00   2. Methamphetamine use disorder, severe, dependence (HCC)  F15.20 304.40       CLINICAL MANUVERING/INTERVENTIONS: Therapist utilized a person-centered approach to build rapport with group member.  Therapist implemented motivational interviewing techniques to assist client with exploring and resolving ambivalence associated with commitment to change behaviors related to substance use and addiction.  Therapist applied cognitive behavioral  strategies to facilitate identification of maladaptive patterns of thinking and behavior that contribute to client’s risk for continued substance use and relapse.  Therapist employed group interaction activities to build rapport among group members, promote sobriety, and emphasize relapse prevention.  Therapist promoted safe nonjudgmental environment by providing group members with unconditional positive regard and encouraging group members to comply with group rules and guidelines. Therapist assisted group member with identifying and implementing healthier coping strategies.      PLAN:  Continue [facility] IOP Phase I   Aftercare:  [facility] Phase II  Program Assignments:  Personal recovery plan, relapse prevention plan, attendance of recovery support group meetings, exploration of sponsorship, drug/alcohol screens.     Please note that portions of this note were completed with a voice recognition program. Efforts were made to edit dictation, but occasionally words are mistranscribed.       This document signed by Amor Tabares, April 21, 2022, 09:04 EDT

## 2022-04-23 LAB — ETHANOL UR-MCNC: NEGATIVE %

## 2022-04-25 ENCOUNTER — OFFICE VISIT (OUTPATIENT)
Dept: PSYCHIATRY | Facility: HOSPITAL | Age: 37
End: 2022-04-25

## 2022-04-25 DIAGNOSIS — F15.20 METHAMPHETAMINE USE DISORDER, SEVERE, DEPENDENCE: Primary | ICD-10-CM

## 2022-04-25 DIAGNOSIS — F11.20 OPIOID USE DISORDER, SEVERE, DEPENDENCE: ICD-10-CM

## 2022-04-25 PROCEDURE — H0015 ALCOHOL AND/OR DRUG SERVICES: HCPCS | Performed by: NURSE PRACTITIONER

## 2022-04-26 NOTE — PROGRESS NOTES
NAME: Ada Vogt  DATE: 04/14/2022    [facility] GROUP   [id number]    Number of participants: 10  IOP GROUP NOTE     DATA:     3 hour IOP group therapy session (Check-ins, Coping Skills, Relapse Prevention)     Check Ins: Therapist continued facilitation of rapport building strategies between group members. Therapist asked that each patient check in with home life and recovery efforts and identify triggers, cravings, and high risk situations that arise between group sessions. Therapist provided empathy and support during group session.     Session Content/Coping Skills: Check in’s completed by group members. Clinician discussed with group member’s passive, aggressive, and assertive communication styles. Clinician educated group members on detox and rehab and that these services could be accessed by visiting ED. Group members identified strengths. Review of Living in Balance, Attitudes and Beliefs completed. Clinician reviewed with group members [employer] “High Risk Situations” psychoeducational material. Group members developed and shared their relapse prevention plans.      Response: Client attended class in person. Client participated in completion of check in form.   Client on check in form denied suicidal or homicidal thoughts or plan or intent to hurt self or others currently or since last group meeting.   Client on check in form reported no drug or alcohol use since last group meeting.   Client participated in review of psychoeducational material and development and sharing of relapse prevention plan.      Personal Assessment 0-10 Scale (10 worst)    Anxiety:  6   Depression:  4   Cravings: 6     ASSESSMENT:     ..  Lab on 04/12/2022   Component Date Value Ref Range Status   • Ethanol, Urine 04/12/2022 Negative  Cutoff=0.020 % Final   • THC, Screen, Urine 04/12/2022 Negative  Negative Final   • Phencyclidine (PCP), Urine 04/12/2022 Negative  Negative Final   • Cocaine Screen, Urine 04/12/2022 Negative  Negative  Final   • Methamphetamine, Ur 04/12/2022 Negative  Negative Final   • Opiate Screen 04/12/2022 Negative  Negative Final   • Amphetamine Screen, Urine 04/12/2022 Negative  Negative Final   • Benzodiazepine Screen, Urine 04/12/2022 Negative  Negative Final   • Tricyclic Antidepressants Screen 04/12/2022 Negative  Negative Final   • Methadone Screen, Urine 04/12/2022 Positive (A) Negative Final   • Barbiturates Screen, Urine 04/12/2022 Negative  Negative Final   • Oxycodone Screen, Urine 04/12/2022 Negative  Negative Final   • Propoxyphene Screen 04/12/2022 Negative  Negative Final   • Buprenorphine, Screen, Urine 04/12/2022 Negative  Negative Final   • Methadone 04/12/2022 +POSITIVE+   Final   • Methadone, Quantitative 04/12/2022 1073  ng/mg creat Final   • EDDP 04/12/2022 6757  ng/mg creat Final   • Level of Detection: 04/12/2022 Comment   Final    Testing Threshold:  50 ng/mL                                                                       '  This test was developed and its performance characteristics  determined by [facility].  It has not been cleared or approved  by the Food and Drug Administration.   Lab on 04/07/2022   Component Date Value Ref Range Status   • THC, Screen, Urine 04/07/2022 Negative  Negative Final   • Phencyclidine (PCP), Urine 04/07/2022 Negative  Negative Final   • Cocaine Screen, Urine 04/07/2022 Negative  Negative Final   • Methamphetamine, Ur 04/07/2022 Negative  Negative Final   • Opiate Screen 04/07/2022 Negative  Negative Final   • Amphetamine Screen, Urine 04/07/2022 Negative  Negative Final   • Benzodiazepine Screen, Urine 04/07/2022 Negative  Negative Final   • Tricyclic Antidepressants Screen 04/07/2022 Negative  Negative Final   • Methadone Screen, Urine 04/07/2022 Positive (A) Negative Final   • Barbiturates Screen, Urine 04/07/2022 Negative  Negative Final   • Oxycodone Screen, Urine 04/07/2022 Negative  Negative Final   • Propoxyphene Screen 04/07/2022 Negative  Negative Final    • Buprenorphine, Screen, Urine 04/07/2022 Negative  Negative Final   • Ethanol, Urine 04/07/2022 Negative  Cutoff=0.020 % Final   • Methadone 04/07/2022 +POSITIVE+   Final   • Methadone, Quantitative 04/07/2022 7121  ng/mg creat Final   • EDDP 04/07/2022 7122  ng/mg creat Final   • Level of Detection: 04/07/2022 Comment   Final    Testing Threshold:  50 ng/mL                                                                       '  This test was developed and its performance characteristics  determined by [facility] [employer] [facility]  It has not been cleared or approved  by the Food and Drug Administration.       Mental Status Exam  Hygiene:  good  Dress: casual  Attitude: cooperative and agreeable   Motor Activity: appropriate  Eye Contact:  good  Speech: regular rate and rhythm   Mood:  calm and cooperative  Affect:  Appropriate  Thought Processes:  Linear  Thought Content:  Normal  Suicidal Thoughts:  denies  Homicidal Thoughts:  denies  Crisis Safety Plan: yes, to come to the emergency room.  Hallucinations:  denies  Reliability: fair  Insight: fair  Judgement: fair  Impulse Control: fair    Family issues related to recovery:  No change, see previous encounters    Recovery/spiritual support group attendance: No     Sponsor: Yes    Motivation for treatment: Patient during initial assessment stated her motivation for treatment is “get my baby back” and start better life.    Patient's Support Network Includes: Patient during initial assessment reported that her sober support system is her sponsor, Mari.    Progress toward goal: Not at goal    Prognosis: Fair with Ongoing Treatment     Self-reported number of days sober: Client reported 20 on check in form.     ASAM Dimensions:  I.    Intoxication/Withdrawal:  0  (Patient during initial assessment denied active withdrawal symptoms).  II.   Medical Conditions/Complications:  0 (Patient during initial assessment denied medical conditions).  III.  Behavioral/Emotional/Cognitive: 1  (Due to patient report of depression and anxiety during initial assessment).  IV.  Readiness to Change: 1 (Patient is ordered for treatment by DCBS, but patient identified motivation for change during initial assessment).  V.   Relapse Risk: 2 (Due to patient report of recent substance use and report of two weeks longest being clean during initial assessment).  VI:  Recovery Environment: 1 (Patient reports she lives with a friend but reporting needs housing assistance and patient reports friend helping with transportation during initial assessment).  Total ASAM Score = 05      BASELINE SCORES 04/07/2022       KATHY-7   (16)                  PHQ-9   (20)         Patient agreeable to adhere to medication regimen as prescribed and report any side effects. Patient will contact this office, call 911 or present to the nearest emergency room should suicidal or homicidal ideations occur.    Impression/Formulation:    ICD-10-CM ICD-9-CM   1. Opioid use disorder, severe, dependence (HCC)  F11.20 304.00   2. Methamphetamine use disorder, severe, dependence (HCC)  F15.20 304.40       CLINICAL MANUVERING/INTERVENTIONS:  Therapist utilized a person-centered approach to build rapport with group member.  Therapist implemented motivational interviewing techniques to assist client with exploring and resolving ambivalence associated with commitment to change behaviors related to substance use and addiction.  Therapist applied cognitive behavioral strategies to facilitate identification of maladaptive patterns of thinking and behavior that contribute to client’s risk for continued substance use and relapse.  Therapist employed group interaction activities to build rapport among group members, promote sobriety, and emphasize relapse prevention.  Therapist promoted safe nonjudgmental environment by providing group members with unconditional positive regard and encouraging group members to comply with group rules and guidelines. Therapist  assisted group member with identifying and implementing healthier coping strategies.      PLAN:  Continue [facility] IOP Phase I   Aftercare:  [facility] Phase II  Program Assignments:  Personal recovery plan, relapse prevention plan, attendance of recovery support group meetings, exploration of sponsorship, drug/alcohol screens.     Please note that portions of this note were completed with a voice recognition program. Efforts were made to edit dictation, but occasionally words are mistranscribed.       This document signed by Amor Tabares, April 26, 2022, 15:09 EDT

## 2022-04-27 ENCOUNTER — LAB (OUTPATIENT)
Dept: LAB | Facility: HOSPITAL | Age: 37
End: 2022-04-27

## 2022-04-27 ENCOUNTER — OFFICE VISIT (OUTPATIENT)
Dept: PSYCHIATRY | Facility: HOSPITAL | Age: 37
End: 2022-04-27

## 2022-04-27 DIAGNOSIS — F11.20 OPIOID USE DISORDER, SEVERE, DEPENDENCE: ICD-10-CM

## 2022-04-27 DIAGNOSIS — F15.20 METHAMPHETAMINE USE DISORDER, SEVERE, DEPENDENCE: Primary | ICD-10-CM

## 2022-04-27 DIAGNOSIS — F15.20 METHAMPHETAMINE USE DISORDER, SEVERE, DEPENDENCE: ICD-10-CM

## 2022-04-27 PROCEDURE — H0015 ALCOHOL AND/OR DRUG SERVICES: HCPCS | Performed by: NURSE PRACTITIONER

## 2022-04-27 PROCEDURE — 80307 DRUG TEST PRSMV CHEM ANLYZR: CPT

## 2022-04-27 PROCEDURE — G0480 DRUG TEST DEF 1-7 CLASSES: HCPCS

## 2022-04-27 NOTE — PROGRESS NOTES
NAME: Ada Vogt  DATE: 04/18/2022    [facility]   [id number]    Number of participants: 9  IOP GROUP NOTE     DATA:     3 hour IOP group therapy session (Check-ins, Coping Skills, Relapse Prevention)     Check Ins: Therapist continued facilitation of rapport building strategies between group members. Therapist asked that each patient check in with home life and recovery efforts and identify triggers, cravings, and high risk situations that arise between group sessions. Therapist provided empathy and support during group session.      Session Content/Coping Skills: Check in’s completed by group members. Clinician discussed phase two of CD-IOP with group members. Clinician educated group members on active listening and I feel communication techniques. Clinician reviewed article “10 reasons addicts struggle in early recovery” ([employer]). Clinician reviewed with group members “The Five Pillars of Recovery” article. BAM scales completed by group members.     Response: Client attended class in person. Client participated in completion of check in form.   Client on check in form denied suicidal thoughts or plan or intent to hurt self currently or since last group meeting. Client on check in form denied homicidal thoughts or plan or intent to hurt self or others currently or since last group meeting.   Client on check in form reported medication concerns since last group meeting.   Client participated in review of psychoeducational material and completion of BAM scale.     Personal Assessment 0-10 Scale (10 worst)    Anxiety:  6   Depression:  5   Cravings: 8     ASSESSMENT:     ..  Lab on 04/12/2022   Component Date Value Ref Range Status   • Ethanol, Urine 04/12/2022 Negative  Cutoff=0.020 % Final   • THC, Screen, Urine 04/12/2022 Negative  Negative Final   • Phencyclidine (PCP), Urine 04/12/2022 Negative  Negative Final   • Cocaine Screen, Urine 04/12/2022 Negative  Negative Final   • Methamphetamine, Ur  04/12/2022 Negative  Negative Final   • Opiate Screen 04/12/2022 Negative  Negative Final   • Amphetamine Screen, Urine 04/12/2022 Negative  Negative Final   • Benzodiazepine Screen, Urine 04/12/2022 Negative  Negative Final   • Tricyclic Antidepressants Screen 04/12/2022 Negative  Negative Final   • Methadone Screen, Urine 04/12/2022 Positive (A) Negative Final   • Barbiturates Screen, Urine 04/12/2022 Negative  Negative Final   • Oxycodone Screen, Urine 04/12/2022 Negative  Negative Final   • Propoxyphene Screen 04/12/2022 Negative  Negative Final   • Buprenorphine, Screen, Urine 04/12/2022 Negative  Negative Final   • Methadone 04/12/2022 +POSITIVE+   Final   • Methadone, Quantitative 04/12/2022 1073  ng/mg creat Final   • EDDP 04/12/2022 6757  ng/mg creat Final   • Level of Detection: 04/12/2022 Comment   Final    Testing Threshold:  50 ng/mL                                                                       '  This test was developed and its performance characteristics  determined by [facility].  It has not been cleared or approved  by the Food and Drug Administration.   Lab on 04/07/2022   Component Date Value Ref Range Status   • THC, Screen, Urine 04/07/2022 Negative  Negative Final   • Phencyclidine (PCP), Urine 04/07/2022 Negative  Negative Final   • Cocaine Screen, Urine 04/07/2022 Negative  Negative Final   • Methamphetamine, Ur 04/07/2022 Negative  Negative Final   • Opiate Screen 04/07/2022 Negative  Negative Final   • Amphetamine Screen, Urine 04/07/2022 Negative  Negative Final   • Benzodiazepine Screen, Urine 04/07/2022 Negative  Negative Final   • Tricyclic Antidepressants Screen 04/07/2022 Negative  Negative Final   • Methadone Screen, Urine 04/07/2022 Positive (A) Negative Final   • Barbiturates Screen, Urine 04/07/2022 Negative  Negative Final   • Oxycodone Screen, Urine 04/07/2022 Negative  Negative Final   • Propoxyphene Screen 04/07/2022 Negative  Negative Final   • Buprenorphine, Screen,  Urine 04/07/2022 Negative  Negative Final   • Ethanol, Urine 04/07/2022 Negative  Cutoff=0.020 % Final   • Methadone 04/07/2022 +POSITIVE+   Final   • Methadone, Quantitative 04/07/2022 7121  ng/mg creat Final   • EDDP 04/07/2022 7122  ng/mg creat Final   • Level of Detection: 04/07/2022 Comment   Final    Testing Threshold:  50 ng/mL                                                                       '  This test was developed and its performance characteristics  determined by [facility].  It has not been cleared or approved  by the Food and Drug Administration.       Mental Status Exam  Hygiene:  good  Dress: casual  Attitude: cooperative and agreeable   Motor Activity: appropriate  Eye Contact:  good  Speech: regular rate and rhythm   Mood:  calm and cooperative  Affect:  Appropriate  Thought Processes:  Linear  Thought Content:  Normal  Suicidal Thoughts:  denies  Homicidal Thoughts:  denies  Crisis Safety Plan: yes, to come to the emergency room.  Hallucinations:  denies  Reliability: fair  Insight: fair  Judgement: fair  Impulse Control: fair    Family issues related to recovery:  No change, see previous encounters    Recovery/spiritual support group attendance: No     Sponsor: No      Motivation for treatment: Patient during initial assessment stated her motivation for treatment is “get my baby back” and start better life.    Patient's Support Network Includes: Patient during initial assessment reported that her sober support system is her sponsor, Mari.    Progress toward goal: Not at goal    Prognosis: Fair with Ongoing Treatment     Self-reported number of days sober: Patient reported 24 on check in form.     ASAM Dimensions:  I.    Intoxication/Withdrawal:  0  (Patient during initial assessment denied active withdrawal symptoms).  II.   Medical Conditions/Complications:  0 (Patient during initial assessment denied medical conditions).  III.  Behavioral/Emotional/Cognitive: 1 (Due to patient report of  depression and anxiety during initial assessment).  IV.  Readiness to Change: 1 (Patient is ordered for treatment by DCBS, but patient identified motivation for change during initial assessment).  V.   Relapse Risk: 2 (Due to patient report of recent substance use and report of two weeks longest being clean during initial assessment).  VI:  Recovery Environment: 1 (Patient reports she lives with a friend but reporting needs housing assistance and patient reports friend helping with transportation during initial assessment).  Total ASAM Score = 05      BASELINE SCORES 04/07/2022       KATHY-7   (16)                  PHQ-9   (20)           Patient agreeable to adhere to medication regimen as prescribed and report any side effects. Patient will contact this office, call 911 or present to the nearest emergency room should suicidal or homicidal ideations occur.    Impression/Formulation:    ICD-10-CM ICD-9-CM   1. Opioid use disorder, severe, dependence (HCC)  F11.20 304.00   2. Methamphetamine use disorder, severe, dependence (HCC)  F15.20 304.40       CLINICAL MANUVERING/INTERVENTIONS: Therapist utilized a person-centered approach to build rapport with group member.  Therapist implemented motivational interviewing techniques to assist client with exploring and resolving ambivalence associated with commitment to change behaviors related to substance use and addiction.  Therapist applied cognitive behavioral strategies to facilitate identification of maladaptive patterns of thinking and behavior that contribute to client’s risk for continued substance use and relapse.  Therapist employed group interaction activities to build rapport among group members, promote sobriety, and emphasize relapse prevention.  Therapist promoted safe nonjudgmental environment by providing group members with unconditional positive regard and encouraging group members to comply with group rules and guidelines. Therapist assisted group member with  identifying and implementing healthier coping strategies.      PLAN:  Continue [facility] IOP Phase I   Aftercare:  [facility] Phase II  Program Assignments:  Personal recovery plan, relapse prevention plan, attendance of recovery support group meetings, exploration of sponsorship, drug/alcohol screens.     Please note that portions of this note were completed with a voice recognition program. Efforts were made to edit dictation, but occasionally words are mistranscribed.       This document signed by Amor Tabares, April 27, 2022, 13:05 EDT

## 2022-04-28 ENCOUNTER — OFFICE VISIT (OUTPATIENT)
Dept: PSYCHIATRY | Facility: HOSPITAL | Age: 37
End: 2022-04-28

## 2022-04-28 DIAGNOSIS — F11.20 OPIOID USE DISORDER, SEVERE, DEPENDENCE: ICD-10-CM

## 2022-04-28 DIAGNOSIS — F15.20 METHAMPHETAMINE USE DISORDER, SEVERE, DEPENDENCE: Primary | ICD-10-CM

## 2022-04-28 PROCEDURE — H0015 ALCOHOL AND/OR DRUG SERVICES: HCPCS | Performed by: NURSE PRACTITIONER

## 2022-04-29 LAB
EDDP/CREAT UR: >6944 NG/MG CREAT
ETHANOL UR-MCNC: NEGATIVE %
LEVEL OF DETECTION:: NORMAL
METHADONE UR QL CFM: NORMAL
METHADONE/CREAT UR: >6944 NG/MG CREAT

## 2022-04-29 NOTE — PROGRESS NOTES
This provider is located at [facility] located at [address] [place] [address], [address] [place] [address].  The Patient is seen remotely at his/her home, using Zoom. Patient is being seen via telehealth and confirm that they are in a secure environment for this session. The patient's condition being diagnosed/treated is appropriate for telemedicine. The provider identified himself as well as his credentials.   The patient gave consent to be seen remotely, and when consent is given they understand that the consent allows for patient identifiable information to be sent to a third party as needed.   They may refuse to be seen remotely at any time. The electronic data is encrypted and password protected, and the patient has been advised of the potential risks to privacy not withstanding such measures.      NAME: Ada Vogt  DATE: 04/21/2022    [facility] GROUP   [id number]    Number of participants: 10  IOP GROUP NOTE     DATA:     3 hour IOP group therapy session (Check-ins, Coping Skills, Relapse Prevention)     Check Ins: Therapist continued facilitation of rapport building strategies between group members. Therapist asked that each patient check in with home life and recovery efforts and identify triggers, cravings, and high risk situations that arise between group sessions. Therapist provided empathy and support during group session.     Session Content/Coping Skills: Check ins completed by group members. Clinician explored with group members celebrities that are in recovery. Clinician explored with group members factors that contribute to addiction and importance of understanding your history of addiction. Clinician educated group members on dual diagnoses and discussed with group members the importance of parallel treatment. [employer] video “Addiction: Tomorrow is going to be better” Doyle Singh’s story finished. Clinician reminded group members that they must meet with MAT provider.     Response: Client  attended class via Zoom. Client participated in verbal completion of check in.   Client during check in denied suicidal or homicidal thoughts.   Client participated in group discussions and viewing “Addiction: Tomorrow is going to be better” Doyle Singh’s story video.      Personal Assessment 0-10 Scale (10 worst)    Anxiety:  4   Depression:  4   Cravings: 2     ASSESSMENT:     ..  Lab on 04/21/2022   Component Date Value Ref Range Status   • THC, Screen, Urine 04/21/2022 Negative  Negative Final   • Phencyclidine (PCP), Urine 04/21/2022 Negative  Negative Final   • Cocaine Screen, Urine 04/21/2022 Negative  Negative Final   • Methamphetamine, Ur 04/21/2022 Negative  Negative Final   • Opiate Screen 04/21/2022 Negative  Negative Final   • Amphetamine Screen, Urine 04/21/2022 Negative  Negative Final   • Benzodiazepine Screen, Urine 04/21/2022 Negative  Negative Final   • Tricyclic Antidepressants Screen 04/21/2022 Negative  Negative Final   • Methadone Screen, Urine 04/21/2022 Positive (A) Negative Final   • Barbiturates Screen, Urine 04/21/2022 Negative  Negative Final   • Oxycodone Screen, Urine 04/21/2022 Negative  Negative Final   • Propoxyphene Screen 04/21/2022 Negative  Negative Final   • Buprenorphine, Screen, Urine 04/21/2022 Negative  Negative Final   • Ethanol, Urine 04/21/2022 Negative  Cutoff=0.020 % Final   Lab on 04/12/2022   Component Date Value Ref Range Status   • Ethanol, Urine 04/12/2022 Negative  Cutoff=0.020 % Final   • THC, Screen, Urine 04/12/2022 Negative  Negative Final   • Phencyclidine (PCP), Urine 04/12/2022 Negative  Negative Final   • Cocaine Screen, Urine 04/12/2022 Negative  Negative Final   • Methamphetamine, Ur 04/12/2022 Negative  Negative Final   • Opiate Screen 04/12/2022 Negative  Negative Final   • Amphetamine Screen, Urine 04/12/2022 Negative  Negative Final   • Benzodiazepine Screen, Urine 04/12/2022 Negative  Negative Final   • Tricyclic Antidepressants Screen 04/12/2022  Negative  Negative Final   • Methadone Screen, Urine 04/12/2022 Positive (A) Negative Final   • Barbiturates Screen, Urine 04/12/2022 Negative  Negative Final   • Oxycodone Screen, Urine 04/12/2022 Negative  Negative Final   • Propoxyphene Screen 04/12/2022 Negative  Negative Final   • Buprenorphine, Screen, Urine 04/12/2022 Negative  Negative Final   • Methadone 04/12/2022 +POSITIVE+   Final   • Methadone, Quantitative 04/12/2022 1073  ng/mg creat Final   • EDDP 04/12/2022 6757  ng/mg creat Final   • Level of Detection: 04/12/2022 Comment   Final    Testing Threshold:  50 ng/mL                                                                       '  This test was developed and its performance characteristics  determined by [facility].  It has not been cleared or approved  by the Food and Drug Administration.   Lab on 04/07/2022   Component Date Value Ref Range Status   • THC, Screen, Urine 04/07/2022 Negative  Negative Final   • Phencyclidine (PCP), Urine 04/07/2022 Negative  Negative Final   • Cocaine Screen, Urine 04/07/2022 Negative  Negative Final   • Methamphetamine, Ur 04/07/2022 Negative  Negative Final   • Opiate Screen 04/07/2022 Negative  Negative Final   • Amphetamine Screen, Urine 04/07/2022 Negative  Negative Final   • Benzodiazepine Screen, Urine 04/07/2022 Negative  Negative Final   • Tricyclic Antidepressants Screen 04/07/2022 Negative  Negative Final   • Methadone Screen, Urine 04/07/2022 Positive (A) Negative Final   • Barbiturates Screen, Urine 04/07/2022 Negative  Negative Final   • Oxycodone Screen, Urine 04/07/2022 Negative  Negative Final   • Propoxyphene Screen 04/07/2022 Negative  Negative Final   • Buprenorphine, Screen, Urine 04/07/2022 Negative  Negative Final   • Ethanol, Urine 04/07/2022 Negative  Cutoff=0.020 % Final   • Methadone 04/07/2022 +POSITIVE+   Final   • Methadone, Quantitative 04/07/2022 7121  ng/mg creat Final   • EDDP 04/07/2022 7122  ng/mg creat Final   • Level of  "Detection: 04/07/2022 Comment   Final    Testing Threshold:  50 ng/mL                                                                       '  This test was developed and its performance characteristics  determined by [employer].  It has not been cleared or approved  by the Food and Drug Administration.       Mental Status Exam  Hygiene:  good  Dress: casual  Attitude: cooperative and agreeable   Motor Activity: appropriate  Eye Contact:  good  Speech: regular rate and rhythm   Mood:  calm and cooperative  Affect:  Appropriate  Thought Processes:  Linear  Thought Content:  Normal  Suicidal Thoughts:  denies  Homicidal Thoughts:  denies  Crisis Safety Plan: yes, to come to the emergency room.  Hallucinations:  denies  Reliability: fair  Insight: fair  Judgement: fair  Impulse Control: fair    Family issues related to recovery:  No change, see previous encounters    Recovery/spiritual support group attendance: No     Sponsor: Yes      Motivation for treatment: Patient during initial assessment stated her motivation for treatment is “get my baby back” and start better life.    Patient's Support Network Includes: Patient during initial assessment reported that her sober support system is her sponsor, Mari.     Progress toward goal: Not at goal    Prognosis: Fair with Ongoing Treatment     Self-reported number of days sober: Patient stated \"27 I think, 26 or 27\" during check in.     ASAM Dimensions:  I.    Intoxication/Withdrawal:  0  (Patient during initial assessment denied active withdrawal symptoms).  II.   Medical Conditions/Complications:  0 (Patient during initial assessment denied medical conditions).  III.  Behavioral/Emotional/Cognitive: 1 (Due to patient report of depression and anxiety during initial assessment).  IV.  Readiness to Change: 1 (Patient is ordered for treatment by DCBS, but patient identified motivation for change during initial assessment).  V.   Relapse Risk: 2 (Due to patient report of " recent substance use and report of two weeks longest being clean during initial assessment).  VI:  Recovery Environment: 1 (Patient reports she lives with a friend but reporting needs housing assistance and patient reports friend helping with transportation during initial assessment).  Total ASAM Score = 05      BASELINE SCORES 04/07/2022       KATHY-7   (16)                  PHQ-9   (20)         Patient agreeable to adhere to medication regimen as prescribed and report any side effects. Patient will contact this office, call 911 or present to the nearest emergency room should suicidal or homicidal ideations occur.    Impression/Formulation:    ICD-10-CM ICD-9-CM   1. Methamphetamine use disorder, severe, dependence (HCC)  F15.20 304.40   2. Opioid use disorder, severe, dependence (HCC)  F11.20 304.00       CLINICAL MANUVERING/INTERVENTIONS: Therapist utilized a person-centered approach to build rapport with group member.  Therapist implemented motivational interviewing techniques to assist client with exploring and resolving ambivalence associated with commitment to change behaviors related to substance use and addiction.  Therapist applied cognitive behavioral strategies to facilitate identification of maladaptive patterns of thinking and behavior that contribute to client’s risk for continued substance use and relapse.  Therapist employed group interaction activities to build rapport among group members, promote sobriety, and emphasize relapse prevention.  Therapist promoted safe nonjudgmental environment by providing group members with unconditional positive regard and encouraging group members to comply with group rules and guidelines. Therapist assisted group member with identifying and implementing healthier coping strategies.        PLAN:  Continue [facility] IOP Phase I   Aftercare:  [facility] Phase II  Program Assignments:  Personal recovery plan,  relapse prevention plan, attendance of recovery support group meetings, exploration of sponsorship, drug/alcohol screens.     Please note that portions of this note were completed with a voice recognition program. Efforts were made to edit dictation, but occasionally words are mistranscribed.       This document signed by Amor Tabares, April 29, 2022, 15:16 EDT

## 2022-04-29 NOTE — PROGRESS NOTES
"NAME: Ada Vogt  DATE: 04/20/2022    [facility] GROUP   [id number]    Number of participants: 8  IOP GROUP NOTE     DATA:     3 hour IOP group therapy session (Check-ins, Coping Skills, Relapse Prevention)     Check Ins: Therapist continued facilitation of rapport building strategies between group members. Therapist asked that each patient check in with home life and recovery efforts and identify triggers, cravings, and high risk situations that arise between group sessions. Therapist provided empathy and support during group session.     Session Content/Coping Skills: Check in’s completed by group members. “Addiction: What is Denial” ([employer]) psychoeducational material reviewed with group members. “The many sides of denial in addiction” ([employer]) psychoeducational material reviewed with group members. Group members identified types of denial they have displayed in the past. [employer] video “Addiction: Tomorrow is going to be better” Doyle Singh’s story video viewed with group members.      Response: Client attended class in person. Client participated in completion of check in form.   Client on check in form denied suicidal thoughts or plan or intent to hurt self currently or since last group meeting. Client on check in form denied homicidal thoughts or plan or intent to hurt self or others currently or since last group meeting. Patient marked yes for medication concerns and wrote \"methadone increase?\".   Client participated in review of psychoeducational material, discussion of denial, and viewing “Addiction: Tomorrow is going to be better” Doyle Singh’s story video.      Personal Assessment 0-10 Scale (10 worst)    Anxiety:  6   Depression:  5   Cravings: 7     ASSESSMENT:     ..  Lab on 04/12/2022   Component Date Value Ref Range Status   • Ethanol, Urine 04/12/2022 Negative  Cutoff=0.020 % Final   • THC, Screen, Urine 04/12/2022 Negative  Negative Final   • Phencyclidine (PCP), Urine 04/12/2022 " Negative  Negative Final   • Cocaine Screen, Urine 04/12/2022 Negative  Negative Final   • Methamphetamine, Ur 04/12/2022 Negative  Negative Final   • Opiate Screen 04/12/2022 Negative  Negative Final   • Amphetamine Screen, Urine 04/12/2022 Negative  Negative Final   • Benzodiazepine Screen, Urine 04/12/2022 Negative  Negative Final   • Tricyclic Antidepressants Screen 04/12/2022 Negative  Negative Final   • Methadone Screen, Urine 04/12/2022 Positive (A) Negative Final   • Barbiturates Screen, Urine 04/12/2022 Negative  Negative Final   • Oxycodone Screen, Urine 04/12/2022 Negative  Negative Final   • Propoxyphene Screen 04/12/2022 Negative  Negative Final   • Buprenorphine, Screen, Urine 04/12/2022 Negative  Negative Final   • Methadone 04/12/2022 +POSITIVE+   Final   • Methadone, Quantitative 04/12/2022 1073  ng/mg creat Final   • EDDP 04/12/2022 6757  ng/mg creat Final   • Level of Detection: 04/12/2022 Comment   Final    Testing Threshold:  50 ng/mL                                                                       '  This test was developed and its performance characteristics  determined by [facility].  It has not been cleared or approved  by the Food and Drug Administration.   Lab on 04/07/2022   Component Date Value Ref Range Status   • THC, Screen, Urine 04/07/2022 Negative  Negative Final   • Phencyclidine (PCP), Urine 04/07/2022 Negative  Negative Final   • Cocaine Screen, Urine 04/07/2022 Negative  Negative Final   • Methamphetamine, Ur 04/07/2022 Negative  Negative Final   • Opiate Screen 04/07/2022 Negative  Negative Final   • Amphetamine Screen, Urine 04/07/2022 Negative  Negative Final   • Benzodiazepine Screen, Urine 04/07/2022 Negative  Negative Final   • Tricyclic Antidepressants Screen 04/07/2022 Negative  Negative Final   • Methadone Screen, Urine 04/07/2022 Positive (A) Negative Final   • Barbiturates Screen, Urine 04/07/2022 Negative  Negative Final   • Oxycodone Screen, Urine 04/07/2022  Negative  Negative Final   • Propoxyphene Screen 04/07/2022 Negative  Negative Final   • Buprenorphine, Screen, Urine 04/07/2022 Negative  Negative Final   • Ethanol, Urine 04/07/2022 Negative  Cutoff=0.020 % Final   • Methadone 04/07/2022 +POSITIVE+   Final   • Methadone, Quantitative 04/07/2022 7121  ng/mg creat Final   • EDDP 04/07/2022 7122  ng/mg creat Final   • Level of Detection: 04/07/2022 Comment   Final    Testing Threshold:  50 ng/mL                                                                       '  This test was developed and its performance characteristics  determined by [facility].  It has not been cleared or approved  by the Food and Drug Administration.       Mental Status Exam  Hygiene:  good  Dress: casual  Attitude: cooperative and agreeable   Motor Activity: appropriate  Eye Contact:  good  Speech: regular rate and rhythm   Mood:  calm and cooperative  Affect:  Appropriate  Thought Processes:  Linear  Thought Content:  Normal  Suicidal Thoughts:  denies  Homicidal Thoughts:  denies  Crisis Safety Plan: yes, to come to the emergency room.  Hallucinations:  denies  Reliability: fair  Insight: fair  Judgement: fair  Impulse Control: fair    Family issues related to recovery:  No change, see previous encounters    Recovery/spiritual support group attendance: No     Sponsor: No     Motivation for treatment: Patient during initial assessment stated her motivation for treatment is “get my baby back” and start better life.    Patient's Support Network Includes: Patient during initial assessment reported that her sober support system is her sponsor, Mari.    Progress toward goal: Not at goal    Prognosis: Fair with Ongoing Treatment     Self-reported number of days sober:  Patient reported 26 on check in form.     ASAM Dimensions:  I.    Intoxication/Withdrawal:  0  (Patient during initial assessment denied active withdrawal symptoms).  II.   Medical Conditions/Complications:  0 (Patient during  initial assessment denied medical conditions).  III.  Behavioral/Emotional/Cognitive: 1 (Due to patient report of depression and anxiety during initial assessment).  IV.  Readiness to Change: 1 (Patient is ordered for treatment by DCBS, but patient identified motivation for change during initial assessment).  V.   Relapse Risk: 2 (Due to patient report of recent substance use and report of two weeks longest being clean during initial assessment).  VI:  Recovery Environment: 1 (Patient reports she lives with a friend but reporting needs housing assistance and patient reports friend helping with transportation during initial assessment).  Total ASAM Score = 05      BASELINE SCORES 04/07/2022       KATHY-7   (16)                  PHQ-9   (20)         Patient agreeable to adhere to medication regimen as prescribed and report any side effects. Patient will contact this office, call 911 or present to the nearest emergency room should suicidal or homicidal ideations occur.    Impression/Formulation:    ICD-10-CM ICD-9-CM   1. Opioid use disorder, severe, dependence (HCC)  F11.20 304.00   2. Methamphetamine use disorder, severe, dependence (HCC)  F15.20 304.40       CLINICAL MANUVERING/INTERVENTIONS: Therapist utilized a person-centered approach to build rapport with group member.  Therapist implemented motivational interviewing techniques to assist client with exploring and resolving ambivalence associated with commitment to change behaviors related to substance use and addiction.  Therapist applied cognitive behavioral strategies to facilitate identification of maladaptive patterns of thinking and behavior that contribute to client’s risk for continued substance use and relapse.  Therapist employed group interaction activities to build rapport among group members, promote sobriety, and emphasize relapse prevention.  Therapist promoted safe nonjudgmental environment by providing group members with unconditional positive  regard and encouraging group members to comply with group rules and guidelines. Therapist assisted group member with identifying and implementing healthier coping strategies.      PLAN:  Continue [facility] IOP Phase I   Aftercare:  [facility] Phase II  Program Assignments:  Personal recovery plan, relapse prevention plan, attendance of recovery support group meetings, exploration of sponsorship, drug/alcohol screens.     Please note that portions of this note were completed with a voice recognition program. Efforts were made to edit dictation, but occasionally words are mistranscribed.       This document signed by Amor Tabares, April 29, 2022, 13:38 EDT

## 2022-05-02 ENCOUNTER — OFFICE VISIT (OUTPATIENT)
Dept: PSYCHIATRY | Facility: HOSPITAL | Age: 37
End: 2022-05-02

## 2022-05-02 DIAGNOSIS — F15.20 METHAMPHETAMINE USE DISORDER, SEVERE, DEPENDENCE: Primary | ICD-10-CM

## 2022-05-02 DIAGNOSIS — F11.20 OPIOID USE DISORDER, SEVERE, DEPENDENCE: ICD-10-CM

## 2022-05-02 PROCEDURE — H0015 ALCOHOL AND/OR DRUG SERVICES: HCPCS | Performed by: NURSE PRACTITIONER

## 2022-05-02 NOTE — PROGRESS NOTES
"NAME: Ada Vogt  DATE: 04/25/2022    [facility] GROUP   [id number]    Number of participants: 10  IOP GROUP NOTE     DATA:     3 hour IOP group therapy session (Check-ins, Coping Skills, Relapse Prevention)     Check Ins: Therapist continued facilitation of rapport building strategies between group members. Therapist asked that each patient check in with home life and recovery efforts and identify triggers, cravings, and high risk situations that arise between group sessions. Therapist provided empathy and support during group session.     Session Content/Coping Skills: Check ins completed by group members. , Gini, joined for first part of meeting and shared just for today reading. Community resource guide was provided to group members in attendance in person and was reviewed with group members.     Response: Client attended class in person. Client participated in completion of check in form.   Client on check in form denied suicidal thoughts or plan or intent to hurt self currently or since last group meeting. Client on check in form denied homicidal thoughts or plan or intent to hurt self or others currently or since last group meeting. Patient reported on check in form that she had utilized a new support since last group meeting and patient wrote \"aunt\".   Client participated in group discussions and review of resource guide.     Personal Assessment 0-10 Scale (10 worst)    Anxiety:  4   Depression:  3   Cravings: 2     ASSESSMENT:     ..  Lab on 04/21/2022   Component Date Value Ref Range Status   • THC, Screen, Urine 04/21/2022 Negative  Negative Final   • Phencyclidine (PCP), Urine 04/21/2022 Negative  Negative Final   • Cocaine Screen, Urine 04/21/2022 Negative  Negative Final   • Methamphetamine, Ur 04/21/2022 Negative  Negative Final   • Opiate Screen 04/21/2022 Negative  Negative Final   • Amphetamine Screen, Urine 04/21/2022 Negative  Negative Final   • Benzodiazepine Screen, " Urine 04/21/2022 Negative  Negative Final   • Tricyclic Antidepressants Screen 04/21/2022 Negative  Negative Final   • Methadone Screen, Urine 04/21/2022 Positive (A) Negative Final   • Barbiturates Screen, Urine 04/21/2022 Negative  Negative Final   • Oxycodone Screen, Urine 04/21/2022 Negative  Negative Final   • Propoxyphene Screen 04/21/2022 Negative  Negative Final   • Buprenorphine, Screen, Urine 04/21/2022 Negative  Negative Final   • Ethanol, Urine 04/21/2022 Negative  Cutoff=0.020 % Final   • Methadone 04/21/2022 +POSITIVE+   Final   • Methadone, Quantitative 04/21/2022 >6944  ng/mg creat Final   • EDDP 04/21/2022 >6944  ng/mg creat Final   • Level of Detection: 04/21/2022 Comment   Final    Testing Threshold:  50 ng/mL                                                                       '  This test was developed and its performance characteristics  determined by [facility].  It has not been cleared or approved  by the Food and Drug Administration.   Lab on 04/12/2022   Component Date Value Ref Range Status   • Ethanol, Urine 04/12/2022 Negative  Cutoff=0.020 % Final   • THC, Screen, Urine 04/12/2022 Negative  Negative Final   • Phencyclidine (PCP), Urine 04/12/2022 Negative  Negative Final   • Cocaine Screen, Urine 04/12/2022 Negative  Negative Final   • Methamphetamine, Ur 04/12/2022 Negative  Negative Final   • Opiate Screen 04/12/2022 Negative  Negative Final   • Amphetamine Screen, Urine 04/12/2022 Negative  Negative Final   • Benzodiazepine Screen, Urine 04/12/2022 Negative  Negative Final   • Tricyclic Antidepressants Screen 04/12/2022 Negative  Negative Final   • Methadone Screen, Urine 04/12/2022 Positive (A) Negative Final   • Barbiturates Screen, Urine 04/12/2022 Negative  Negative Final   • Oxycodone Screen, Urine 04/12/2022 Negative  Negative Final   • Propoxyphene Screen 04/12/2022 Negative  Negative Final   • Buprenorphine, Screen, Urine 04/12/2022 Negative  Negative Final   • Methadone  04/12/2022 +POSITIVE+   Final   • Methadone, Quantitative 04/12/2022 1073  ng/mg creat Final   • EDDP 04/12/2022 6757  ng/mg creat Final   • Level of Detection: 04/12/2022 Comment   Final    Testing Threshold:  50 ng/mL                                                                       '  This test was developed and its performance characteristics  determined by [facility].  It has not been cleared or approved  by the Food and Drug Administration.   Lab on 04/07/2022   Component Date Value Ref Range Status   • THC, Screen, Urine 04/07/2022 Negative  Negative Final   • Phencyclidine (PCP), Urine 04/07/2022 Negative  Negative Final   • Cocaine Screen, Urine 04/07/2022 Negative  Negative Final   • Methamphetamine, Ur 04/07/2022 Negative  Negative Final   • Opiate Screen 04/07/2022 Negative  Negative Final   • Amphetamine Screen, Urine 04/07/2022 Negative  Negative Final   • Benzodiazepine Screen, Urine 04/07/2022 Negative  Negative Final   • Tricyclic Antidepressants Screen 04/07/2022 Negative  Negative Final   • Methadone Screen, Urine 04/07/2022 Positive (A) Negative Final   • Barbiturates Screen, Urine 04/07/2022 Negative  Negative Final   • Oxycodone Screen, Urine 04/07/2022 Negative  Negative Final   • Propoxyphene Screen 04/07/2022 Negative  Negative Final   • Buprenorphine, Screen, Urine 04/07/2022 Negative  Negative Final   • Ethanol, Urine 04/07/2022 Negative  Cutoff=0.020 % Final   • Methadone 04/07/2022 +POSITIVE+   Final   • Methadone, Quantitative 04/07/2022 7121  ng/mg creat Final   • EDDP 04/07/2022 7122  ng/mg creat Final   • Level of Detection: 04/07/2022 Comment   Final    Testing Threshold:  50 ng/mL                                                                       '  This test was developed and its performance characteristics  determined by [facility].  It has not been cleared or approved  by the Food and Drug Administration.       Mental Status Exam  Hygiene:  good  Dress: casual  Attitude:  cooperative and agreeable   Motor Activity: appropriate  Eye Contact:  good  Speech: regular rate and rhythm   Mood:  calm and cooperative  Affect:  Appropriate  Thought Processes:  Linear  Thought Content:  Normal  Suicidal Thoughts:  denies  Homicidal Thoughts:  denies  Crisis Safety Plan: yes, to come to the emergency room.  Hallucinations:  denies  Reliability: fair  Insight: fair  Judgement: fair  Impulse Control: fair    Family issues related to recovery:  No change, see previous encounters    Recovery/spiritual support group attendance: No     Sponsor: Yes     Motivation for treatment: Patient during initial assessment stated her motivation for treatment is “get my baby back” and start better life.    Patient's Support Network Includes: Patient during initial assessment reported that her sober support system is her sponsor, Mari.    Progress toward goal: Not at goal    Prognosis: Fair with Ongoing Treatment     Self-reported number of days sober: Patient reported 30 on check in form.     ASAM Dimensions:  I.    Intoxication/Withdrawal:  0  (Patient during initial assessment denied active withdrawal symptoms).  II.   Medical Conditions/Complications:  0 (Patient during initial assessment denied medical conditions).  III.  Behavioral/Emotional/Cognitive: 1 (Due to patient report of depression and anxiety during initial assessment).  IV.  Readiness to Change: 1 (Patient is ordered for treatment by DCBS, but patient identified motivation for change during initial assessment).  V.   Relapse Risk: 2 (Due to patient report of recent substance use and report of two weeks longest being clean during initial assessment).  VI:  Recovery Environment: 1 (Patient reports she lives with a friend but reporting needs housing assistance and patient reports friend helping with transportation during initial assessment).  Total ASAM Score = 05      BASELINE SCORES 04/07/2022       KATHY-7   (16)                  PHQ-9   (20)        Patient agreeable to adhere to medication regimen as prescribed and report any side effects. Patient will contact this office, call 911 or present to the nearest emergency room should suicidal or homicidal ideations occur.    Impression/Formulation:    ICD-10-CM ICD-9-CM   1. Methamphetamine use disorder, severe, dependence (HCC)  F15.20 304.40   2. Opioid use disorder, severe, dependence (HCC)  F11.20 304.00       CLINICAL MANUVERING/INTERVENTIONS: Therapist utilized a person-centered approach to build rapport with group member.  Therapist implemented motivational interviewing techniques to assist client with exploring and resolving ambivalence associated with commitment to change behaviors related to substance use and addiction.  Therapist applied cognitive behavioral strategies to facilitate identification of maladaptive patterns of thinking and behavior that contribute to client’s risk for continued substance use and relapse.  Therapist employed group interaction activities to build rapport among group members, promote sobriety, and emphasize relapse prevention.  Therapist promoted safe nonjudgmental environment by providing group members with unconditional positive regard and encouraging group members to comply with group rules and guidelines. Therapist assisted group member with identifying and implementing healthier coping strategies.      PLAN:  Continue [facility] IOP Phase I   Aftercare:  [facility] Phase II  Program Assignments:  Personal recovery plan, relapse prevention plan, attendance of recovery support group meetings, exploration of sponsorship, drug/alcohol screens.     Please note that portions of this note were completed with a voice recognition program. Efforts were made to edit dictation, but occasionally words are mistranscribed.       This document signed by Amor Tabares, May 2, 2022, 14:06 EDT

## 2022-05-02 NOTE — PROGRESS NOTES
NAME: Ada Vogt  DATE: 04/27/2022     IOP GROUP   [id number]    Number of participants: 10  IOP GROUP NOTE     DATA:     3 hour IOP group therapy session (Check-ins, Coping Skills, Relapse Prevention)     Check Ins: Therapist continued facilitation of rapport building strategies between group members. Therapist asked that each patient check in with home life and recovery efforts and identify triggers, cravings, and high risk situations that arise between group sessions. Therapist provided empathy and support during group session.     Session Content/Coping Skills: Check ins completed by group members. Group discussion facilitated regarding getting GED. Clinician explored with group members coping skills, discussed coping skills log, and provided coping skills log to group members who attended in person. , Gini, joined for first part of meeting and shared just for today reading. Clinician explored resentments with group members and processed through these. Group members were asked to identify their anger warning signs. Anger Management Skills Therapist Aid Psychoeducational Material reviewed, [employer] Therapist Aid Psychoeducational Material reviewed, and Emotion Regulation Skills Therapist Aid Psychoeducational Material reviewed.     Response: Client attended class in person. Client participated in completion of check in form.   Client on check in form denied suicidal thoughts or plan or intent to hurt self currently or since last group meeting. Client on check in form denied homicidal thoughts or plan or intent to hurt self or others currently or since last group meeting. Client participated in group discussions and review of psychoeducational material.      Personal Assessment 0-10 Scale (10 worst)    Anxiety:  9   Depression:  7   Cravings: 6     ASSESSMENT:     ..  Lab on 04/27/2022   Component Date Value Ref Range Status   • THC, Screen, Urine 04/27/2022 Negative  Negative  Final   • Phencyclidine (PCP), Urine 04/27/2022 Negative  Negative Final   • Cocaine Screen, Urine 04/27/2022 Negative  Negative Final   • Methamphetamine, Ur 04/27/2022 Negative  Negative Final   • Opiate Screen 04/27/2022 Negative  Negative Final   • Amphetamine Screen, Urine 04/27/2022 Negative  Negative Final   • Benzodiazepine Screen, Urine 04/27/2022 Negative  Negative Final   • Tricyclic Antidepressants Screen 04/27/2022 Negative  Negative Final   • Methadone Screen, Urine 04/27/2022 Positive (A) Negative Final   • Barbiturates Screen, Urine 04/27/2022 Negative  Negative Final   • Oxycodone Screen, Urine 04/27/2022 Negative  Negative Final   • Propoxyphene Screen 04/27/2022 Negative  Negative Final   • Buprenorphine, Screen, Urine 04/27/2022 Negative  Negative Final   • Ethanol, Urine 04/27/2022 Negative  Cutoff=0.020 % Final   Lab on 04/21/2022   Component Date Value Ref Range Status   • THC, Screen, Urine 04/21/2022 Negative  Negative Final   • Phencyclidine (PCP), Urine 04/21/2022 Negative  Negative Final   • Cocaine Screen, Urine 04/21/2022 Negative  Negative Final   • Methamphetamine, Ur 04/21/2022 Negative  Negative Final   • Opiate Screen 04/21/2022 Negative  Negative Final   • Amphetamine Screen, Urine 04/21/2022 Negative  Negative Final   • Benzodiazepine Screen, Urine 04/21/2022 Negative  Negative Final   • Tricyclic Antidepressants Screen 04/21/2022 Negative  Negative Final   • Methadone Screen, Urine 04/21/2022 Positive (A) Negative Final   • Barbiturates Screen, Urine 04/21/2022 Negative  Negative Final   • Oxycodone Screen, Urine 04/21/2022 Negative  Negative Final   • Propoxyphene Screen 04/21/2022 Negative  Negative Final   • Buprenorphine, Screen, Urine 04/21/2022 Negative  Negative Final   • Ethanol, Urine 04/21/2022 Negative  Cutoff=0.020 % Final   • Methadone 04/21/2022 +POSITIVE+   Final   • Methadone, Quantitative 04/21/2022 >6944  ng/mg creat Final   • EDDP 04/21/2022 >6944  ng/mg creat  Final   • Level of Detection: 04/21/2022 Comment   Final    Testing Threshold:  50 ng/mL                                                                       '  This test was developed and its performance characteristics  determined by [facility].  It has not been cleared or approved  by the Food and Drug Administration.   Lab on 04/12/2022   Component Date Value Ref Range Status   • Ethanol, Urine 04/12/2022 Negative  Cutoff=0.020 % Final   • THC, Screen, Urine 04/12/2022 Negative  Negative Final   • Phencyclidine (PCP), Urine 04/12/2022 Negative  Negative Final   • Cocaine Screen, Urine 04/12/2022 Negative  Negative Final   • Methamphetamine, Ur 04/12/2022 Negative  Negative Final   • Opiate Screen 04/12/2022 Negative  Negative Final   • Amphetamine Screen, Urine 04/12/2022 Negative  Negative Final   • Benzodiazepine Screen, Urine 04/12/2022 Negative  Negative Final   • Tricyclic Antidepressants Screen 04/12/2022 Negative  Negative Final   • Methadone Screen, Urine 04/12/2022 Positive (A) Negative Final   • Barbiturates Screen, Urine 04/12/2022 Negative  Negative Final   • Oxycodone Screen, Urine 04/12/2022 Negative  Negative Final   • Propoxyphene Screen 04/12/2022 Negative  Negative Final   • Buprenorphine, Screen, Urine 04/12/2022 Negative  Negative Final   • Methadone 04/12/2022 +POSITIVE+   Final   • Methadone, Quantitative 04/12/2022 1073  ng/mg creat Final   • EDDP 04/12/2022 6757  ng/mg creat Final   • Level of Detection: 04/12/2022 Comment   Final    Testing Threshold:  50 ng/mL                                                                       '  This test was developed and its performance characteristics  determined by [facility].  It has not been cleared or approved  by the Food and Drug Administration.   Lab on 04/07/2022   Component Date Value Ref Range Status   • THC, Screen, Urine 04/07/2022 Negative  Negative Final   • Phencyclidine (PCP), Urine 04/07/2022 Negative  Negative Final   • Cocaine  Screen, Urine 04/07/2022 Negative  Negative Final   • Methamphetamine, Ur 04/07/2022 Negative  Negative Final   • Opiate Screen 04/07/2022 Negative  Negative Final   • Amphetamine Screen, Urine 04/07/2022 Negative  Negative Final   • Benzodiazepine Screen, Urine 04/07/2022 Negative  Negative Final   • Tricyclic Antidepressants Screen 04/07/2022 Negative  Negative Final   • Methadone Screen, Urine 04/07/2022 Positive (A) Negative Final   • Barbiturates Screen, Urine 04/07/2022 Negative  Negative Final   • Oxycodone Screen, Urine 04/07/2022 Negative  Negative Final   • Propoxyphene Screen 04/07/2022 Negative  Negative Final   • Buprenorphine, Screen, Urine 04/07/2022 Negative  Negative Final   • Ethanol, Urine 04/07/2022 Negative  Cutoff=0.020 % Final   • Methadone 04/07/2022 +POSITIVE+   Final   • Methadone, Quantitative 04/07/2022 7121  ng/mg creat Final   • EDDP 04/07/2022 7122  ng/mg creat Final   • Level of Detection: 04/07/2022 Comment   Final    Testing Threshold:  50 ng/mL                                                                       '  This test was developed and its performance characteristics  determined by [facility].  It has not been cleared or approved  by the Food and Drug Administration.       Mental Status Exam  Hygiene:  good  Dress: casual  Attitude: cooperative and agreeable   Motor Activity: appropriate  Eye Contact:  good  Speech: regular rate and rhythm   Mood:  calm and cooperative  Affect:  Appropriate  Thought Processes:  Linear  Thought Content:  Normal  Suicidal Thoughts:  denies  Homicidal Thoughts:  denies  Crisis Safety Plan: yes, to come to the emergency room.  Hallucinations:  denies  Reliability: fair  Insight: fair  Judgement: fair  Impulse Control: fair    Family issues related to recovery:  No change, see previous encounters    Recovery/spiritual support group attendance: No     Sponsor: Yes     Motivation for treatment: Patient during initial assessment stated her motivation  for treatment is “get my baby back” and start better life.    Patient's Support Network Includes: Patient during initial assessment reported that her sober support system is her sponsor, Mari.    Progress toward goal: Not at goal    Prognosis: Fair with Ongoing Treatment     Self-reported number of days sober: Patient reported 32 on check in form.     ASAM Dimensions:  I.    Intoxication/Withdrawal:  0  (Patient during initial assessment denied active withdrawal symptoms).  II.   Medical Conditions/Complications:  0 (Patient during initial assessment denied medical conditions).  III.  Behavioral/Emotional/Cognitive: 1 (Due to patient report of depression and anxiety during initial assessment).  IV.  Readiness to Change: 1 (Patient is ordered for treatment by DCBS, but patient identified motivation for change during initial assessment).  V.   Relapse Risk: 2 (Due to patient report of recent substance use and report of two weeks longest being clean during initial assessment).  VI:  Recovery Environment: 1 (Patient reports she lives with a friend but reporting needs housing assistance and patient reports friend helping with transportation during initial assessment).  Total ASAM Score = 05      BASELINE SCORES 04/07/2022       KATHY-7   (16)                  PHQ-9   (20)         Patient agreeable to adhere to medication regimen as prescribed and report any side effects. Patient will contact this office, call 911 or present to the nearest emergency room should suicidal or homicidal ideations occur.    Impression/Formulation:    ICD-10-CM ICD-9-CM   1. Methamphetamine use disorder, severe, dependence (HCC)  F15.20 304.40   2. Opioid use disorder, severe, dependence (HCC)  F11.20 304.00       CLINICAL MANUVERING/INTERVENTIONS: Therapist utilized a person-centered approach to build rapport with group member.  Therapist implemented motivational interviewing techniques to assist client with exploring and resolving  ambivalence associated with commitment to change behaviors related to substance use and addiction.  Therapist applied cognitive behavioral strategies to facilitate identification of maladaptive patterns of thinking and behavior that contribute to client’s risk for continued substance use and relapse.  Therapist employed group interaction activities to build rapport among group members, promote sobriety, and emphasize relapse prevention.  Therapist promoted safe nonjudgmental environment by providing group members with unconditional positive regard and encouraging group members to comply with group rules and guidelines. Therapist assisted group member with identifying and implementing healthier coping strategies.      PLAN:  Continue [facility] IOP Phase I   Aftercare:  [facility] Phase II  Program Assignments:  Personal recovery plan, relapse prevention plan, attendance of recovery support group meetings, exploration of sponsorship, drug/alcohol screens.     Please note that portions of this note were completed with a voice recognition program. Efforts were made to edit dictation, but occasionally words are mistranscribed.       This document signed by Amor Tabares, May 2, 2022, 15:14 EDT

## 2022-05-02 NOTE — PROGRESS NOTES
NAME: Ada Vogt  DATE: 04/28/2022    [facility] GROUP   [id number]    Number of participants: 8  IOP GROUP NOTE     DATA:     3 hour IOP group therapy session (Check-ins, Coping Skills, Relapse Prevention)     Check Ins: Therapist continued facilitation of rapport building strategies between group members. Therapist asked that each patient check in with home life and recovery efforts and identify triggers, cravings, and high risk situations that arise between group sessions. Therapist provided empathy and support during group session.     Session Content/Coping Skills: Check ins completed by group members. Clinician explored with group members the topic of finding yourself in recovery. Clinician processed group stressors with group members. Living in Balance- Human Needs and Social Relationships reviewed with group members. Clinician discussed with group members Maslow’s Hierarchy of Needs. Patient participated in verbal review of treatment plan.     Response: Client attended class in person. Client participated in completion of check in form.   Client on check in form denied suicidal thoughts or plan or intent to hurt self currently or since last group meeting. Client on check in form denied homicidal thoughts or plan or intent to hurt self or others currently or since last group meeting. Client participated in group discussions and review of psychoeducational material.     Patient participated in verbal review of treatment plan. When asked about high risk situations, patient reported she has a friend who still uses. Patient reports coping skills used are calling Crystal, Talking to sponsor or messaging her daily, started reading CR book, and trying to do things by herself. Labs were verbally reviewed with patient during treatment plan review. Patient verbally agreed to treatment plan.     Personal Assessment 0-10 Scale (10 worst)    Anxiety:  9   Depression:  8   Cravings: 8     ASSESSMENT:     ..  Lab on  04/27/2022   Component Date Value Ref Range Status   • THC, Screen, Urine 04/27/2022 Negative  Negative Final   • Phencyclidine (PCP), Urine 04/27/2022 Negative  Negative Final   • Cocaine Screen, Urine 04/27/2022 Negative  Negative Final   • Methamphetamine, Ur 04/27/2022 Negative  Negative Final   • Opiate Screen 04/27/2022 Negative  Negative Final   • Amphetamine Screen, Urine 04/27/2022 Negative  Negative Final   • Benzodiazepine Screen, Urine 04/27/2022 Negative  Negative Final   • Tricyclic Antidepressants Screen 04/27/2022 Negative  Negative Final   • Methadone Screen, Urine 04/27/2022 Positive (A) Negative Final   • Barbiturates Screen, Urine 04/27/2022 Negative  Negative Final   • Oxycodone Screen, Urine 04/27/2022 Negative  Negative Final   • Propoxyphene Screen 04/27/2022 Negative  Negative Final   • Buprenorphine, Screen, Urine 04/27/2022 Negative  Negative Final   • Ethanol, Urine 04/27/2022 Negative  Cutoff=0.020 % Final   Lab on 04/21/2022   Component Date Value Ref Range Status   • THC, Screen, Urine 04/21/2022 Negative  Negative Final   • Phencyclidine (PCP), Urine 04/21/2022 Negative  Negative Final   • Cocaine Screen, Urine 04/21/2022 Negative  Negative Final   • Methamphetamine, Ur 04/21/2022 Negative  Negative Final   • Opiate Screen 04/21/2022 Negative  Negative Final   • Amphetamine Screen, Urine 04/21/2022 Negative  Negative Final   • Benzodiazepine Screen, Urine 04/21/2022 Negative  Negative Final   • Tricyclic Antidepressants Screen 04/21/2022 Negative  Negative Final   • Methadone Screen, Urine 04/21/2022 Positive (A) Negative Final   • Barbiturates Screen, Urine 04/21/2022 Negative  Negative Final   • Oxycodone Screen, Urine 04/21/2022 Negative  Negative Final   • Propoxyphene Screen 04/21/2022 Negative  Negative Final   • Buprenorphine, Screen, Urine 04/21/2022 Negative  Negative Final   • Ethanol, Urine 04/21/2022 Negative  Cutoff=0.020 % Final   • Methadone 04/21/2022 +POSITIVE+    Final   • Methadone, Quantitative 04/21/2022 >6944  ng/mg creat Final   • EDDP 04/21/2022 >6944  ng/mg creat Final   • Level of Detection: 04/21/2022 Comment   Final    Testing Threshold:  50 ng/mL                                                                       '  This test was developed and its performance characteristics  determined by [facility].  It has not been cleared or approved  by the Food and Drug Administration.   Lab on 04/12/2022   Component Date Value Ref Range Status   • Ethanol, Urine 04/12/2022 Negative  Cutoff=0.020 % Final   • THC, Screen, Urine 04/12/2022 Negative  Negative Final   • Phencyclidine (PCP), Urine 04/12/2022 Negative  Negative Final   • Cocaine Screen, Urine 04/12/2022 Negative  Negative Final   • Methamphetamine, Ur 04/12/2022 Negative  Negative Final   • Opiate Screen 04/12/2022 Negative  Negative Final   • Amphetamine Screen, Urine 04/12/2022 Negative  Negative Final   • Benzodiazepine Screen, Urine 04/12/2022 Negative  Negative Final   • Tricyclic Antidepressants Screen 04/12/2022 Negative  Negative Final   • Methadone Screen, Urine 04/12/2022 Positive (A) Negative Final   • Barbiturates Screen, Urine 04/12/2022 Negative  Negative Final   • Oxycodone Screen, Urine 04/12/2022 Negative  Negative Final   • Propoxyphene Screen 04/12/2022 Negative  Negative Final   • Buprenorphine, Screen, Urine 04/12/2022 Negative  Negative Final   • Methadone 04/12/2022 +POSITIVE+   Final   • Methadone, Quantitative 04/12/2022 1073  ng/mg creat Final   • EDDP 04/12/2022 6757  ng/mg creat Final   • Level of Detection: 04/12/2022 Comment   Final    Testing Threshold:  50 ng/mL                                                                       '  This test was developed and its performance characteristics  determined by [facility].  It has not been cleared or approved  by the Food and Drug Administration.       Mental Status Exam  Hygiene:  good  Dress: casual  Attitude: cooperative and agreeable    Motor Activity: appropriate  Eye Contact:  good  Speech: regular rate and rhythm   Mood:  calm and cooperative  Affect:  Appropriate  Thought Processes:  Linear  Thought Content:  Normal  Suicidal Thoughts:  denies  Homicidal Thoughts:  denies  Crisis Safety Plan: yes, to come to the emergency room.  Hallucinations:  denies  Reliability: fair  Insight: fair  Judgement: fair  Impulse Control: fair    Family issues related to recovery:  No change, see previous encounters    Recovery/spiritual support group attendance: No      Sponsor: Yes     Motivation for treatment: Patient during initial assessment stated her motivation for treatment is “get my baby back” and start better life.     Patient's Support Network Includes: Patient during initial assessment reported that her sober support system is her sponsor, Mari.     Progress toward goal: Not at goal    Prognosis: Fair with Ongoing Treatment     Self-reported number of days sober: Patient reported 33 on check in form.     ASAM Dimensions:  I.    Intoxication/Withdrawal:  0  (Patient during initial assessment denied active withdrawal symptoms).  II.   Medical Conditions/Complications:  0 (Patient during initial assessment denied medical conditions).  III.  Behavioral/Emotional/Cognitive: 1 (Due to patient report of depression and anxiety during initial assessment).  IV.  Readiness to Change: 1 (Patient is ordered for treatment by DCBS, but patient identified motivation for change during initial assessment).  V.   Relapse Risk: 2 (Due to patient report of recent substance use and report of two weeks longest being clean during initial assessment).  VI:  Recovery Environment: 1 (Patient reports she lives with a friend but reporting needs housing assistance and patient reports friend helping with transportation during initial assessment).  Total ASAM Score = 05      BASELINE SCORES 04/07/2022       KATHY-7   (16)                  PHQ-9   (20)          Patient  agreeable to adhere to medication regimen as prescribed and report any side effects. Patient will contact this office, call 911 or present to the nearest emergency room should suicidal or homicidal ideations occur.    Impression/Formulation:    ICD-10-CM ICD-9-CM   1. Methamphetamine use disorder, severe, dependence (HCC)  F15.20 304.40   2. Opioid use disorder, severe, dependence (HCC)  F11.20 304.00       CLINICAL MANUVERING/INTERVENTIONS: Therapist utilized a person-centered approach to build rapport with group member.  Therapist implemented motivational interviewing techniques to assist client with exploring and resolving ambivalence associated with commitment to change behaviors related to substance use and addiction.  Therapist applied cognitive behavioral strategies to facilitate identification of maladaptive patterns of thinking and behavior that contribute to client’s risk for continued substance use and relapse.  Therapist employed group interaction activities to build rapport among group members, promote sobriety, and emphasize relapse prevention.  Therapist promoted safe nonjudgmental environment by providing group members with unconditional positive regard and encouraging group members to comply with group rules and guidelines. Therapist assisted group member with identifying and implementing healthier coping strategies.      PLAN:  Continue [facility] IOP Phase I   Aftercare:  [facility] Phase II  Program Assignments:  Personal recovery plan, relapse prevention plan, attendance of recovery support group meetings, exploration of sponsorship, drug/alcohol screens.     Please note that portions of this note were completed with a voice recognition program. Efforts were made to edit dictation, but occasionally words are mistranscribed.       This document signed by Amor Tabares, May 2, 2022, 16:29 EDT

## 2022-05-04 ENCOUNTER — LAB (OUTPATIENT)
Dept: LAB | Facility: HOSPITAL | Age: 37
End: 2022-05-04

## 2022-05-04 DIAGNOSIS — F11.20 OPIOID USE DISORDER, SEVERE, DEPENDENCE: ICD-10-CM

## 2022-05-04 DIAGNOSIS — F15.20 METHAMPHETAMINE USE DISORDER, SEVERE, DEPENDENCE: ICD-10-CM

## 2022-05-04 LAB
AMPHET+METHAMPHET UR QL: NEGATIVE
AMPHETAMINES UR QL: NEGATIVE
BARBITURATES UR QL SCN: NEGATIVE
BENZODIAZ UR QL SCN: NEGATIVE
BUPRENORPHINE SERPL-MCNC: NEGATIVE NG/ML
CANNABINOIDS SERPL QL: NEGATIVE
COCAINE UR QL: NEGATIVE
EDDP/CREAT UR: 3983 NG/MG CREAT
LEVEL OF DETECTION:: NORMAL
METHADONE UR QL CFM: NORMAL
METHADONE UR QL SCN: POSITIVE
METHADONE/CREAT UR: 1979 NG/MG CREAT
OPIATES UR QL: NEGATIVE
OXYCODONE UR QL SCN: NEGATIVE
PCP UR QL SCN: NEGATIVE
PROPOXYPH UR QL: NEGATIVE
TRICYCLICS UR QL SCN: NEGATIVE

## 2022-05-04 PROCEDURE — G0480 DRUG TEST DEF 1-7 CLASSES: HCPCS

## 2022-05-04 PROCEDURE — 80307 DRUG TEST PRSMV CHEM ANLYZR: CPT

## 2022-05-05 ENCOUNTER — OFFICE VISIT (OUTPATIENT)
Dept: PSYCHIATRY | Facility: HOSPITAL | Age: 37
End: 2022-05-05

## 2022-05-05 ENCOUNTER — DOCUMENTATION (OUTPATIENT)
Dept: PSYCHIATRY | Facility: HOSPITAL | Age: 37
End: 2022-05-05

## 2022-05-05 DIAGNOSIS — F11.20 OPIOID USE DISORDER, SEVERE, DEPENDENCE: ICD-10-CM

## 2022-05-05 DIAGNOSIS — F15.20 METHAMPHETAMINE USE DISORDER, SEVERE, DEPENDENCE: Primary | ICD-10-CM

## 2022-05-05 LAB — ETHANOL UR-MCNC: NEGATIVE %

## 2022-05-05 PROCEDURE — H0015 ALCOHOL AND/OR DRUG SERVICES: HCPCS | Performed by: NURSE PRACTITIONER

## 2022-05-05 NOTE — PROGRESS NOTES
[facility]  [address], [address]  [place] [address]      Intensive Outpatient Program for Chemical Dependence (CD IOP)  Verbally Updated on 4/28/2022    Treatment Plan Reassessment (biweekly)       Long Term Goal:  Ada Vogt will establish a sustained recovery and will maintain total abstinence from mind-altering substances other than what is prescribed and/or approved by the attending physician. Pt will learn, practice, and utilize behavioral and cognitive coping skills to help maintain sobriety.    Patient Care Needs: Ada Vogt presents with Methamphetamine use disorder, severe, dependence and Opioid use disorder, severe, dependence and is at high risk for relapse without continued treatment.  Pt has a history of using drugs/alcohol until intoxicated or passed out and has been unable to stop or cut down use of alcohol/drugs once starting, despite verbalized desire to do so, and the negative consequences from continued use.  Pt's use has negatively impacted social, occupational, and/or physical functioning.     1.  Patient will participate in a medical evaluation to assess the effects of chemical dependence and will cooperate with an evaluation by the attending psychiatrist or psychiatric nurse practitioner for psychotropic medication if appropriate.      2.  Patient will adhere to the IOP group rules.        3.  Patient will attend group sessions as scheduled. Patient will notify therapist and support staff of any absences or tardies. Patient will provide a valid and verifiable excuse for absences to be considered excused.    4.  Patient will participate in random drug and alcohol screenings and will exhibit negative results on said screenings.    5.  Patient will identify high risk situations, people, and places to avoid or manage in order to maintain sobriety.    6.  Patient will participate in group discussions by giving and receiving feedback  appropriately.      7.  Patient will develop a positive network of support by attending a minimum of two recovery/spiritual support groups each week (two outside of IOP group) and by exploring, obtaining or maintaining a sponsor or sober support person.      8.  Patient will identify, practice, and implement at least 5 healthy coping strategies to manage difficult emotions while remaining abstinent.    9.   Patient will develop relapse prevention skills and be able to identify and share them with the group in the form of a relapse prevention plan.    10.  Patient will write a personal recovery plan and share it with the group prior to discharge.    11.  Patient will offer family participation in family education groups, if appropriate.    12.  Patient will exhibit increased motivation to change as assessed by his/her cooperation and behavior and the ASAM assessment tool.    13.  Patient will utilize healthy coping skills to manage depressive and anxious symptoms without using alcohol or other mind-altering substances and will exhibit decreased score on the PHQ-9 and KATHY-7.   1. Patient participated in an evaluation for medication management with Lauryn ROLDAN. Patient is meeting with medical provider as scheduled for medication management and follow-up.          2.  Patient read and signed IOP Group Rules. Patient is adhering to group rules.      3.  Patient has 0 unexcused absences.                  4. As of date of treatment plan update, patient has tested positive for methadone but has brought verification that she is seen at [facility] and receives Methadone.          5.  Patient has encountered the following high-risk situations since beginning treatment: Patient discussed a friend who still uses.      6.  Patient provides and receives feedback daily. When not actively participating, patient is attentive and appropriate.    7.  Patient is not attending recovery/spiritual support group  meetings at least once times per week. Patient reports she does have a sponsor.     8.  Patient has identified and implemented the following coping strategies during high risk situations: Patient reported she has called , talking to sponsor daily, started reading CR book, and trying to do things by herself.         9.  Patient has identified high-risk people, places, and things as well as healthy coping strategies for avoiding relapse.       10.  Patient has not yet developed a personal recovery plan.      11.  No family participation.          12.  Patient reported cravings as an 8 on a 0:10 scale (0-none) on check in form patient completed on 4/28/2022. Patient's behavior indicates ongoing motivation for sobriety.     13. Pt is managing anxiety and or depression without using mind-altering substances.      1.  Partially completed.  Patient participated initial evaluation for medication management with YULI Lea.  Patient will continue participating in as scheduled medication management and follow-up appointments with psychiatric medical provider.    2.  Patient is making progress toward goal and will continue working toward objective.     3.  Patient will continue working toward objective.                   4.  Patient will continue working toward objective.          5.  Partially completed. Patient will continue working toward objective.      6.  Partially completed. Patient will continue working toward objective.        7.  Patient is making progress toward goal.                   8.  Partially completed. Patient will continue working toward objective.            9.  Partially completed. Patient will continue working toward objective.           10.  Partially completed. Patient will continue working toward objective.       11.  Patient will continue to encourage family participation.      12.  Partially completed.  Patient will continue implementing behavioral changes to promote  long-term sobriety and recovery.    13. Partially completed.  Anxiety and depression will continue to be monitored.       Team Members:  Patient - Verbally Agreed on 4/28/2022  Medical Provider - YULI Lea   [facility] Licensed Therapist - SAM Panda  [facility] Director - Dimple Null, [facility]  Support Staff

## 2022-05-09 ENCOUNTER — OFFICE VISIT (OUTPATIENT)
Dept: PSYCHIATRY | Facility: HOSPITAL | Age: 37
End: 2022-05-09

## 2022-05-09 DIAGNOSIS — F15.20 METHAMPHETAMINE USE DISORDER, SEVERE, DEPENDENCE: Primary | ICD-10-CM

## 2022-05-09 DIAGNOSIS — F11.20 OPIOID USE DISORDER, SEVERE, DEPENDENCE: ICD-10-CM

## 2022-05-09 PROCEDURE — H0015 ALCOHOL AND/OR DRUG SERVICES: HCPCS | Performed by: NURSE PRACTITIONER

## 2022-05-10 ENCOUNTER — LAB (OUTPATIENT)
Dept: LAB | Facility: HOSPITAL | Age: 37
End: 2022-05-10

## 2022-05-10 ENCOUNTER — OFFICE VISIT (OUTPATIENT)
Dept: PSYCHIATRY | Facility: CLINIC | Age: 37
End: 2022-05-10

## 2022-05-10 VITALS
SYSTOLIC BLOOD PRESSURE: 110 MMHG | BODY MASS INDEX: 18.31 KG/M2 | HEART RATE: 76 BPM | DIASTOLIC BLOOD PRESSURE: 72 MMHG | HEIGHT: 61 IN | WEIGHT: 97 LBS

## 2022-05-10 DIAGNOSIS — K59.03 CONSTIPATION DUE TO OPIOID THERAPY: ICD-10-CM

## 2022-05-10 DIAGNOSIS — T40.2X5A CONSTIPATION DUE TO OPIOID THERAPY: ICD-10-CM

## 2022-05-10 DIAGNOSIS — F41.1 GENERALIZED ANXIETY DISORDER: ICD-10-CM

## 2022-05-10 DIAGNOSIS — F11.21 OPIOID USE DISORDER, SEVERE, IN EARLY REMISSION, ON MAINTENANCE THERAPY, DEPENDENCE (HCC): ICD-10-CM

## 2022-05-10 DIAGNOSIS — F15.20 METHAMPHETAMINE USE DISORDER, SEVERE, DEPENDENCE: Primary | ICD-10-CM

## 2022-05-10 DIAGNOSIS — F11.20 OPIOID USE DISORDER, SEVERE, DEPENDENCE: ICD-10-CM

## 2022-05-10 DIAGNOSIS — F15.20 METHAMPHETAMINE USE DISORDER, SEVERE, DEPENDENCE: ICD-10-CM

## 2022-05-10 LAB
AMPHET+METHAMPHET UR QL: NEGATIVE
AMPHETAMINES UR QL: NEGATIVE
BARBITURATES UR QL SCN: NEGATIVE
BENZODIAZ UR QL SCN: NEGATIVE
BUPRENORPHINE SERPL-MCNC: NEGATIVE NG/ML
CANNABINOIDS SERPL QL: NEGATIVE
COCAINE UR QL: NEGATIVE
EDDP/CREAT UR: NORMAL NG/MG CREAT
LEVEL OF DETECTION:: NORMAL
METHADONE UR QL CFM: NORMAL
METHADONE UR QL SCN: POSITIVE
METHADONE/CREAT UR: 2811 NG/MG CREAT
OPIATES UR QL: NEGATIVE
OXYCODONE UR QL SCN: NEGATIVE
PCP UR QL SCN: NEGATIVE
PROPOXYPH UR QL: NEGATIVE
TRICYCLICS UR QL SCN: NEGATIVE

## 2022-05-10 PROCEDURE — 80307 DRUG TEST PRSMV CHEM ANLYZR: CPT

## 2022-05-10 PROCEDURE — 99213 OFFICE O/P EST LOW 20 MIN: CPT | Performed by: NURSE PRACTITIONER

## 2022-05-10 PROCEDURE — G0480 DRUG TEST DEF 1-7 CLASSES: HCPCS

## 2022-05-10 RX ORDER — BUPROPION HYDROCHLORIDE 100 MG/1
100 TABLET ORAL 2 TIMES DAILY
Qty: 30 TABLET | Refills: 2 | Status: SHIPPED | OUTPATIENT
Start: 2022-05-10 | End: 2022-08-10 | Stop reason: SDUPTHER

## 2022-05-10 RX ORDER — DOCUSATE SODIUM 100 MG/1
100 CAPSULE, LIQUID FILLED ORAL 2 TIMES DAILY PRN
Qty: 60 CAPSULE | Refills: 2 | Status: SHIPPED | OUTPATIENT
Start: 2022-05-10 | End: 2022-08-10 | Stop reason: SDUPTHER

## 2022-05-10 NOTE — PROGRESS NOTES
Office  Follow Up Visit      Patient Name: Ada Vogt  : 1985   MRN: [id number]     Referring Provider: Dawson Samples per DC[employer]    Chief Complaint: Substance use, KATHY    History of Present Illness:   Ada Vogt is a 37 y.o. female who is here today for 4 week follow up of substance use and mood. At last appt c/o daily struggles with excessive worry, intermittent panic attacks, feelings of guilt/worthlessness, and sleeping too much.  Feels most of this stems from active DCBS case and guilt over addiction issues. Wellbutrin titrated up and is now taking 100mg twice a day. Reports anxiety is much improved. Sleeping well. Continues with some feelings of guilt but is able to use coping mechanisms learned in IOP to change her thinking. Continues in Phase 1 and is very happy with her program. Methadone dose recently increased due to withdrawal symptoms at night. Resolved with dose increase. No recurrence of illicit substance use since last appt. Feeling pride in how much progress she has made in her life with the assistance of MOUD and IOP. Denies SI/HI, AVH. Having issues with constipation on methadone. Having hard BM every 5 days. Not taking anything OTC. Has been increasing fiber and water intake. Stool softeners have been helpful in the past. Denies other AE of medication.      History:  Substance use initially started at age 20 with social EtOH intake.  Added THC intermittently around age 25.  Started using prescription pain pills recreationally at age 25 as well as methamphetamine.  Both of these were used daily up until 3/24/2022. Started on methadone at that time. Was using meth to cope with opiate cravings. Has been in IOP in the past but did not like format and felt it was not helpful.  No other reported rehab, detox in the past.  Currently in CBT at [facility]. Has open DCBS case.  Cousin currently has custody of her almost 2-year-old daughter.  Patient gets supervised visitation.  Has upcoming  court date 2022.    Triggers: Stress, anxiety, financial issues    Cravings: Denies any cravings    Relapse Prevention: Continue MOUD, IOP    Urine Drug Screen (today's visit) discussed: +methadone    UDS Confirmation: 2022 + methadone as expected    RITO (PDMP) Reviewed for Current/Active Medications: No active medications    Past Surgical History:  Past Surgical History:   Procedure Laterality Date   •  SECTION         Problem List:  There is no problem list on file for this patient.      Allergy:   Allergies   Allergen Reactions   • Amoxicillin Hives   • Penicillins Hives        Current Medications:   Current Outpatient Medications   Medication Sig Dispense Refill   • buPROPion (WELLBUTRIN) 100 MG tablet Take 1 tablet by mouth 2 (Two) Times a Day. 30 tablet 2   • methadone (DOLOPHINE) 10 MG tablet Take 35 mg by mouth Daily,     • Nexplanon 68 MG implant subdermal implant      • docusate sodium (Colace) 100 MG capsule Take 1 capsule by mouth 2 (Two) Times a Day As Needed for Constipation. 60 capsule 2     No current facility-administered medications for this visit.       Past Medical History:  Past Medical History:   Diagnosis Date   • Anxiety    • Depression    • Panic disorder        Social History:  Social History     Socioeconomic History   • Marital status: Single   Tobacco Use   • Smoking status: Current Every Day Smoker     Packs/day: 1.00     Types: Cigarettes   Vaping Use   • Vaping Use: Some days   • Substances: Nicotine, Flavoring   • Devices: Disposable   Substance and Sexual Activity   • Alcohol use: Not Currently   • Drug use: Not Currently     Types: Methamphetamines, Codeine     Comment: States she has not used in 3 weeks       Family History:  Family History   Problem Relation Age of Onset   • Alcohol abuse Father    • Drug abuse Brother    • Alcohol abuse Brother          Subjective      Review of Systems:   Review of Systems   Constitutional: Negative for chills, fatigue  and fever.   Respiratory: Negative for shortness of breath.    Cardiovascular: Negative for chest pain.   Gastrointestinal: Positive for constipation. Negative for abdominal pain.   Skin: Negative for skin lesions.   Neurological: Negative for seizures and confusion.   Psychiatric/Behavioral: Positive for depressed mood and stress. Negative for hallucinations, sleep disturbance and suicidal ideas. The patient is nervous/anxious.        PHQ-9 Score:   7    KATHY-7 Score:   Feeling nervous, anxious or on edge: Several days  Not being able to stop or control worrying: More than half the days  Worrying too much about different things: More than half the days  Trouble Relaxing: Several days  Being so restless that it is hard to sit still: Not at all  Feeling afraid as if something awful might happen: More than half the days  Becoming easily annoyed or irritable: Several days  KATHY 7 Total Score: 9  If you checked any problems, how difficult have these problems made it for you to do your work, take care of things at home, or get along with other people: Somewhat difficult    Patient History:   The following portions of the patient's history were reviewed and updated as appropriate: allergies, current medications, past family history, past medical history, past social history, past surgical history and problem list.     Social:  Social History     Socioeconomic History   • Marital status: Single   Tobacco Use   • Smoking status: Current Every Day Smoker     Packs/day: 1.00     Types: Cigarettes   Vaping Use   • Vaping Use: Some days   • Substances: Nicotine, Flavoring   • Devices: Disposable   Substance and Sexual Activity   • Alcohol use: Not Currently   • Drug use: Not Currently     Types: Methamphetamines, Codeine     Comment: States she has not used in 3 weeks       Medications:     Current Outpatient Medications:   •  buPROPion (WELLBUTRIN) 100 MG tablet, Take 1 tablet by mouth 2 (Two) Times a Day., Disp: 30 tablet, Rfl:  "2  •  methadone (DOLOPHINE) 10 MG tablet, Take 35 mg by mouth Daily,, Disp: , Rfl:   •  Nexplanon 68 MG implant subdermal implant, , Disp: , Rfl:   •  docusate sodium (Colace) 100 MG capsule, Take 1 capsule by mouth 2 (Two) Times a Day As Needed for Constipation., Disp: 60 capsule, Rfl: 2    Objective     Physical Exam:  Physical Exam    Vital Signs:   Vitals:    05/10/22 1047   BP: 110/72   Pulse: 76   Weight: 44 kg (97 lb)   Height: 154.9 cm (61\")     Body mass index is 18.33 kg/m².     Mental Status Exam:   Hygiene:   good  Cooperation:  Cooperative  Eye Contact:  Good  Psychomotor Behavior:  Appropriate  Affect:  Full range  Mood: normal  Speech:  Normal  Thought Process:  Goal directed  Thought Content:  Normal  Suicidal:  None  Homicidal:  None  Hallucinations:  None  Delusion:  None  Memory:  Intact  Orientation:  Person, Place, Time and Situation  Reliability:  good  Insight:  Good  Judgement:  Good  Impulse Control:  Good    Assessment / Plan      ASSESSMENT/PLAN  -Continue Wellbutrin 100mg twice a day  -Start Colace 100mg twice a day, call with no improvement is s/s or f/u with PCP  -Continue MOUD at current methadone clinic  -[facility], peer support  -Not ready to quit smoking     Visit Diagnoses     Methamphetamine use disorder, severe, dependence (HCC)    -  Primary    Opioid use disorder, severe, in early remission, on maintenance therapy, dependence (HCC)        Generalized anxiety disorder        Relevant Medications    buPROPion (WELLBUTRIN) 100 MG tablet    Constipation due to opioid therapy        Relevant Medications    docusate sodium (Colace) 100 MG capsule      Diagnoses       Codes Comments    Methamphetamine use disorder, severe, dependence (HCC)    -  Primary ICD-10-CM: F15.20  ICD-9-CM: 304.40     Opioid use disorder, severe, in early remission, on maintenance therapy, dependence (HCC)     ICD-10-CM: F11.21  ICD-9-CM: 304.03     Generalized anxiety disorder     ICD-10-CM: " F41.1  ICD-9-CM: 300.02     Constipation due to opioid therapy     ICD-10-CM: K59.03, T40.2X5A  ICD-9-CM: 564.09, E935.2             PLAN:  1. Safety: No acute safety concerns  2. Risk Assessment: Risk of self-harm acutely is low. Risk of self-harm chronically is also low, but could be further elevated in the event of treatment noncompliance and/or AODA.      TREATMENT PLAN/GOALS: Continue supportive psychotherapy efforts and medications as indicated. Treatment and medication options discussed during today's visit. Patient acknowledged and verbally consented to continue with current treatment plan and was educated on the importance of compliance with treatment and follow-up appointments.      MEDICATION ISSUES:  RITO reviewed as expected.  Discussed medication options and treatment plan of prescribed medication as well as the risks, benefits, and side effects including potential falls, possible impaired driving and metabolic adversities among others. Patient is agreeable to call the office with any worsening of symptoms or onset of side effects. Patient is agreeable to call 911 or go to the nearest ER should he/she begin having SI/HI. No medication side effects or related complaints today.     MEDS ORDERED DURING VISIT:  New Medications Ordered This Visit   Medications   • docusate sodium (Colace) 100 MG capsule     Sig: Take 1 capsule by mouth 2 (Two) Times a Day As Needed for Constipation.     Dispense:  60 capsule     Refill:  2   • buPROPion (WELLBUTRIN) 100 MG tablet     Sig: Take 1 tablet by mouth 2 (Two) Times a Day.     Dispense:  30 tablet     Refill:  2       Return in about 3 months (around 8/10/2022) for Follow Up Medication.           This document has been electronically signed by YULI Lea  May 10, 2022 12:24 EDT      Part of this note may be an electronic transcription/translation of spoken language to printed text using the Dragon Dictation System.

## 2022-05-11 ENCOUNTER — OFFICE VISIT (OUTPATIENT)
Dept: PSYCHIATRY | Facility: HOSPITAL | Age: 37
End: 2022-05-11

## 2022-05-11 DIAGNOSIS — F15.20 METHAMPHETAMINE USE DISORDER, SEVERE, DEPENDENCE: Primary | ICD-10-CM

## 2022-05-11 DIAGNOSIS — F11.20 OPIOID USE DISORDER, SEVERE, DEPENDENCE: ICD-10-CM

## 2022-05-11 LAB — ETHANOL UR-MCNC: NEGATIVE %

## 2022-05-11 PROCEDURE — H0015 ALCOHOL AND/OR DRUG SERVICES: HCPCS | Performed by: NURSE PRACTITIONER

## 2022-05-12 ENCOUNTER — OFFICE VISIT (OUTPATIENT)
Dept: PSYCHIATRY | Facility: HOSPITAL | Age: 37
End: 2022-05-12

## 2022-05-12 DIAGNOSIS — F11.20 OPIOID USE DISORDER, SEVERE, DEPENDENCE: ICD-10-CM

## 2022-05-12 DIAGNOSIS — F15.20 METHAMPHETAMINE USE DISORDER, SEVERE, DEPENDENCE: Primary | ICD-10-CM

## 2022-05-12 PROCEDURE — H0015 ALCOHOL AND/OR DRUG SERVICES: HCPCS | Performed by: NURSE PRACTITIONER

## 2022-05-12 NOTE — PROGRESS NOTES
NAME: Ada Vogt  DATE: 05/02/2022     IOP GROUP   [id number]    Number of participants: 8  IOP GROUP NOTE     DATA:     3 hour IOP group therapy session (Check-ins, Coping Skills, Relapse Prevention)     Check Ins: Therapist continued facilitation of rapport building strategies between group members. Therapist asked that each patient check in with home life and recovery efforts and identify triggers, cravings, and high risk situations that arise between group sessions. Therapist provided empathy and support during group session.     Session Content/Coping Skills: Check ins completed by group members. Clinician educated group members on sober living. [place] strengths activity completed and shared by group members. Clinician discussed problem solving with group members. Group members shared gratitudes.     Response: Client attended class in person. Client participated in completion of check in form.   Client on check in form denied suicidal thoughts or plan or intent to hurt self currently or since last group meeting. Client on check in form denied homicidal thoughts or plan or intent to hurt self or others currently or since last group meeting. Client participated in group discussions and activities.     Personal Assessment 0-10 Scale (10 worst)    Anxiety:  3   Depression:  6   Cravings: 4     ASSESSMENT:     ..  Lab on 04/27/2022   Component Date Value Ref Range Status   • THC, Screen, Urine 04/27/2022 Negative  Negative Final   • Phencyclidine (PCP), Urine 04/27/2022 Negative  Negative Final   • Cocaine Screen, Urine 04/27/2022 Negative  Negative Final   • Methamphetamine, Ur 04/27/2022 Negative  Negative Final   • Opiate Screen 04/27/2022 Negative  Negative Final   • Amphetamine Screen, Urine 04/27/2022 Negative  Negative Final   • Benzodiazepine Screen, Urine 04/27/2022 Negative  Negative Final   • Tricyclic Antidepressants Screen 04/27/2022 Negative  Negative Final   • Methadone Screen, Urine  04/27/2022 Positive (A) Negative Final   • Barbiturates Screen, Urine 04/27/2022 Negative  Negative Final   • Oxycodone Screen, Urine 04/27/2022 Negative  Negative Final   • Propoxyphene Screen 04/27/2022 Negative  Negative Final   • Buprenorphine, Screen, Urine 04/27/2022 Negative  Negative Final   • Ethanol, Urine 04/27/2022 Negative  Cutoff=0.020 % Final   • Methadone 04/27/2022 +POSITIVE+   Final   • Methadone, Quantitative 04/27/2022 1979  ng/mg creat Final   • EDDP 04/27/2022 3983  ng/mg creat Final   • Level of Detection: 04/27/2022 Comment   Final    Testing Threshold:  50 ng/mL                                                                       '  This test was developed and its performance characteristics  determined by [facility].  It has not been cleared or approved  by the Food and Drug Administration.   Lab on 04/21/2022   Component Date Value Ref Range Status   • THC, Screen, Urine 04/21/2022 Negative  Negative Final   • Phencyclidine (PCP), Urine 04/21/2022 Negative  Negative Final   • Cocaine Screen, Urine 04/21/2022 Negative  Negative Final   • Methamphetamine, Ur 04/21/2022 Negative  Negative Final   • Opiate Screen 04/21/2022 Negative  Negative Final   • Amphetamine Screen, Urine 04/21/2022 Negative  Negative Final   • Benzodiazepine Screen, Urine 04/21/2022 Negative  Negative Final   • Tricyclic Antidepressants Screen 04/21/2022 Negative  Negative Final   • Methadone Screen, Urine 04/21/2022 Positive (A) Negative Final   • Barbiturates Screen, Urine 04/21/2022 Negative  Negative Final   • Oxycodone Screen, Urine 04/21/2022 Negative  Negative Final   • Propoxyphene Screen 04/21/2022 Negative  Negative Final   • Buprenorphine, Screen, Urine 04/21/2022 Negative  Negative Final   • Ethanol, Urine 04/21/2022 Negative  Cutoff=0.020 % Final   • Methadone 04/21/2022 +POSITIVE+   Final   • Methadone, Quantitative 04/21/2022 >6944  ng/mg creat Final   • EDDP 04/21/2022 >6944  ng/mg creat Final   • Level of  Detection: 04/21/2022 Comment   Final    Testing Threshold:  50 ng/mL                                                                       '  This test was developed and its performance characteristics  determined by [facility].  It has not been cleared or approved  by the Food and Drug Administration.   Lab on 04/12/2022   Component Date Value Ref Range Status   • Ethanol, Urine 04/12/2022 Negative  Cutoff=0.020 % Final   • THC, Screen, Urine 04/12/2022 Negative  Negative Final   • Phencyclidine (PCP), Urine 04/12/2022 Negative  Negative Final   • Cocaine Screen, Urine 04/12/2022 Negative  Negative Final   • Methamphetamine, Ur 04/12/2022 Negative  Negative Final   • Opiate Screen 04/12/2022 Negative  Negative Final   • Amphetamine Screen, Urine 04/12/2022 Negative  Negative Final   • Benzodiazepine Screen, Urine 04/12/2022 Negative  Negative Final   • Tricyclic Antidepressants Screen 04/12/2022 Negative  Negative Final   • Methadone Screen, Urine 04/12/2022 Positive (A) Negative Final   • Barbiturates Screen, Urine 04/12/2022 Negative  Negative Final   • Oxycodone Screen, Urine 04/12/2022 Negative  Negative Final   • Propoxyphene Screen 04/12/2022 Negative  Negative Final   • Buprenorphine, Screen, Urine 04/12/2022 Negative  Negative Final   • Methadone 04/12/2022 +POSITIVE+   Final   • Methadone, Quantitative 04/12/2022 1073  ng/mg creat Final   • EDDP 04/12/2022 6757  ng/mg creat Final   • Level of Detection: 04/12/2022 Comment   Final    Testing Threshold:  50 ng/mL                                                                       '  This test was developed and its performance characteristics  determined by [facility].  It has not been cleared or approved  by the Food and Drug Administration.       Mental Status Exam  Hygiene:  good  Dress: casual  Attitude: cooperative and agreeable   Motor Activity: appropriate  Eye Contact:  good  Speech: regular rate and rhythm   Mood:  calm and cooperative  Affect:   Appropriate  Thought Processes:  Linear  Thought Content:  Normal  Suicidal Thoughts:  denies  Homicidal Thoughts:  denies  Crisis Safety Plan: yes, to come to the emergency room.  Hallucinations:  denies  Reliability: fair  Insight: fair  Judgement: fair  Impulse Control: fair    Family issues related to recovery:  No change, see previous encounters    Recovery/spiritual support group attendance: No     Sponsor: Yes     Motivation for treatment: Patient during initial assessment stated her motivation for treatment is “get my baby back” and start better life.    Patient's Support Network Includes: Patient during initial assessment reported that her sober support system is her sponsor, Mari.    Progress toward goal: Not at goal    Prognosis: Fair with Ongoing Treatment     Self-reported number of days sober: Patient reported 37 on check in form.     ASAM Dimensions:  I.    Intoxication/Withdrawal:  0  (Patient during initial assessment denied active withdrawal symptoms).  II.   Medical Conditions/Complications:  0 (Patient during initial assessment denied medical conditions).  III.  Behavioral/Emotional/Cognitive: 1 (Due to patient report of depression and anxiety during initial assessment).  IV.  Readiness to Change: 1 (Patient is ordered for treatment by DC[employer], but patient identified motivation for change during initial assessment).  V.   Relapse Risk: 2 (Due to patient report of recent substance use and report of two weeks longest being clean during initial assessment).  VI:  Recovery Environment: 1 (Patient reports she lives with a friend but reporting needs housing assistance and patient reports friend helping with transportation during initial assessment).  Total ASAM Score = 05      BASELINE SCORES 04/07/2022       KATHY-7   (16)                  PHQ-9   (20)         Patient agreeable to adhere to medication regimen as prescribed and report any side effects. Patient will contact this office, call 911 or  present to the nearest emergency room should suicidal or homicidal ideations occur.    Impression/Formulation:    ICD-10-CM ICD-9-CM   1. Methamphetamine use disorder, severe, dependence (HCC)  F15.20 304.40   2. Opioid use disorder, severe, dependence (HCC)  F11.20 304.00       CLINICAL MANUVERING/INTERVENTIONS: Therapist utilized a person-centered approach to build rapport with group member.  Therapist implemented motivational interviewing techniques to assist client with exploring and resolving ambivalence associated with commitment to change behaviors related to substance use and addiction.  Therapist applied cognitive behavioral strategies to facilitate identification of maladaptive patterns of thinking and behavior that contribute to client’s risk for continued substance use and relapse.  Therapist employed group interaction activities to build rapport among group members, promote sobriety, and emphasize relapse prevention.  Therapist promoted safe nonjudgmental environment by providing group members with unconditional positive regard and encouraging group members to comply with group rules and guidelines. Therapist assisted group member with identifying and implementing healthier coping strategies.      PLAN:  Continue [facility] IOP Phase I   Aftercare:  [facility] Phase II  Program Assignments:  Personal recovery plan, relapse prevention plan, attendance of recovery support group meetings, exploration of sponsorship, drug/alcohol screens.     Please note that portions of this note were completed with a voice recognition program. Efforts were made to edit dictation, but occasionally words are mistranscribed.       This document signed by Amor Tabares, May 12, 2022, 11:26 EDT

## 2022-05-16 ENCOUNTER — OFFICE VISIT (OUTPATIENT)
Dept: PSYCHIATRY | Facility: HOSPITAL | Age: 37
End: 2022-05-16

## 2022-05-16 DIAGNOSIS — F11.20 OPIOID USE DISORDER, SEVERE, DEPENDENCE: ICD-10-CM

## 2022-05-16 DIAGNOSIS — F15.20 METHAMPHETAMINE USE DISORDER, SEVERE, DEPENDENCE: Primary | ICD-10-CM

## 2022-05-16 PROCEDURE — H0015 ALCOHOL AND/OR DRUG SERVICES: HCPCS | Performed by: NURSE PRACTITIONER

## 2022-05-17 LAB
EDDP/CREAT UR: >7246 NG/MG CREAT
LEVEL OF DETECTION:: NORMAL
METHADONE UR QL CFM: NORMAL
METHADONE/CREAT UR: 4830 NG/MG CREAT

## 2022-05-18 ENCOUNTER — OFFICE VISIT (OUTPATIENT)
Dept: PSYCHIATRY | Facility: HOSPITAL | Age: 37
End: 2022-05-18

## 2022-05-18 ENCOUNTER — LAB (OUTPATIENT)
Dept: LAB | Facility: HOSPITAL | Age: 37
End: 2022-05-18

## 2022-05-18 DIAGNOSIS — F11.20 OPIOID USE DISORDER, SEVERE, DEPENDENCE: ICD-10-CM

## 2022-05-18 DIAGNOSIS — F15.20 METHAMPHETAMINE USE DISORDER, SEVERE, DEPENDENCE: Primary | ICD-10-CM

## 2022-05-18 DIAGNOSIS — F15.20 METHAMPHETAMINE USE DISORDER, SEVERE, DEPENDENCE: ICD-10-CM

## 2022-05-18 PROCEDURE — 80307 DRUG TEST PRSMV CHEM ANLYZR: CPT

## 2022-05-18 PROCEDURE — G0480 DRUG TEST DEF 1-7 CLASSES: HCPCS

## 2022-05-18 PROCEDURE — H0015 ALCOHOL AND/OR DRUG SERVICES: HCPCS | Performed by: NURSE PRACTITIONER

## 2022-05-19 ENCOUNTER — OFFICE VISIT (OUTPATIENT)
Dept: PSYCHIATRY | Facility: HOSPITAL | Age: 37
End: 2022-05-19

## 2022-05-19 DIAGNOSIS — F15.20 METHAMPHETAMINE USE DISORDER, SEVERE, DEPENDENCE: Primary | ICD-10-CM

## 2022-05-19 DIAGNOSIS — F11.20 OPIOID USE DISORDER, SEVERE, DEPENDENCE: ICD-10-CM

## 2022-05-19 PROCEDURE — H0015 ALCOHOL AND/OR DRUG SERVICES: HCPCS | Performed by: NURSE PRACTITIONER

## 2022-05-19 NOTE — PROGRESS NOTES
NAME: Ada Vogt  DATE: 05/09/2022     IOP GROUP   [id number]    Number of participants: 6  IOP GROUP NOTE     DATA:     3 hour IOP group therapy session (Check-ins, Coping Skills, Relapse Prevention)     Check Ins: Therapist continued facilitation of rapport building strategies between group members. Therapist asked that each patient check in with home life and recovery efforts and identify triggers, cravings, and high risk situations that arise between group sessions. Therapist provided empathy and support during group session.     Session Content/Coping Skills: Clinician processed group stressors with group members. Check ins completed by group members. Early Recovery Strategies and coping skills (Ferny, 2013) article reviewed. Self-Care assessment completed and discussed by group members. Stress management therapist aid psychoeducational material began. Outdoor visualization technique using 5 senses completed by group members with deep breathing exercise practiced by group members.     Response: Client attended class in person. Client participated in completion of check in form.   Client on check in form denied suicidal thoughts or plan or intent to hurt self currently or since last group meeting. Client on check in form denied homicidal thoughts or plan or intent to hurt self or others currently or since last group meeting. Client participated in group activities, discussions, and review of psychoeducational material.     Personal Assessment 0-10 Scale (10 worst)    Anxiety:  3   Depression:  4   Cravings: 2     ASSESSMENT:     ..  Lab on 05/04/2022   Component Date Value Ref Range Status   • Ethanol, Urine 05/04/2022 Negative  Cutoff=0.020 % Final   • THC, Screen, Urine 05/04/2022 Negative  Negative Final   • Phencyclidine (PCP), Urine 05/04/2022 Negative  Negative Final   • Cocaine Screen, Urine 05/04/2022 Negative  Negative Final   • Methamphetamine, Ur 05/04/2022 Negative  Negative Final   • Opiate  Screen 05/04/2022 Negative  Negative Final   • Amphetamine Screen, Urine 05/04/2022 Negative  Negative Final   • Benzodiazepine Screen, Urine 05/04/2022 Negative  Negative Final   • Tricyclic Antidepressants Screen 05/04/2022 Negative  Negative Final   • Methadone Screen, Urine 05/04/2022 Positive (A) Negative Final   • Barbiturates Screen, Urine 05/04/2022 Negative  Negative Final   • Oxycodone Screen, Urine 05/04/2022 Negative  Negative Final   • Propoxyphene Screen 05/04/2022 Negative  Negative Final   • Buprenorphine, Screen, Urine 05/04/2022 Negative  Negative Final   • Methadone 05/04/2022 +POSITIVE+   Final   • Methadone, Quantitative 05/04/2022 2811  ng/mg creat Final   • EDDP 05/04/2022 >54901  ng/mg creat Final   • Level of Detection: 05/04/2022 Comment   Final    Testing Threshold:  50 ng/mL                                                                       '  This test was developed and its performance characteristics  determined by [facility].  It has not been cleared or approved  by the Food and Drug Administration.   Lab on 04/27/2022   Component Date Value Ref Range Status   • THC, Screen, Urine 04/27/2022 Negative  Negative Final   • Phencyclidine (PCP), Urine 04/27/2022 Negative  Negative Final   • Cocaine Screen, Urine 04/27/2022 Negative  Negative Final   • Methamphetamine, Ur 04/27/2022 Negative  Negative Final   • Opiate Screen 04/27/2022 Negative  Negative Final   • Amphetamine Screen, Urine 04/27/2022 Negative  Negative Final   • Benzodiazepine Screen, Urine 04/27/2022 Negative  Negative Final   • Tricyclic Antidepressants Screen 04/27/2022 Negative  Negative Final   • Methadone Screen, Urine 04/27/2022 Positive (A) Negative Final   • Barbiturates Screen, Urine 04/27/2022 Negative  Negative Final   • Oxycodone Screen, Urine 04/27/2022 Negative  Negative Final   • Propoxyphene Screen 04/27/2022 Negative  Negative Final   • Buprenorphine, Screen, Urine 04/27/2022 Negative  Negative Final   •  Ethanol, Urine 04/27/2022 Negative  Cutoff=0.020 % Final   • Methadone 04/27/2022 +POSITIVE+   Final   • Methadone, Quantitative 04/27/2022 1979  ng/mg creat Final   • EDDP 04/27/2022 3983  ng/mg creat Final   • Level of Detection: 04/27/2022 Comment   Final    Testing Threshold:  50 ng/mL                                                                       '  This test was developed and its performance characteristics  determined by [facility].  It has not been cleared or approved  by the Food and Drug Administration.   Lab on 04/21/2022   Component Date Value Ref Range Status   • THC, Screen, Urine 04/21/2022 Negative  Negative Final   • Phencyclidine (PCP), Urine 04/21/2022 Negative  Negative Final   • Cocaine Screen, Urine 04/21/2022 Negative  Negative Final   • Methamphetamine, Ur 04/21/2022 Negative  Negative Final   • Opiate Screen 04/21/2022 Negative  Negative Final   • Amphetamine Screen, Urine 04/21/2022 Negative  Negative Final   • Benzodiazepine Screen, Urine 04/21/2022 Negative  Negative Final   • Tricyclic Antidepressants Screen 04/21/2022 Negative  Negative Final   • Methadone Screen, Urine 04/21/2022 Positive (A) Negative Final   • Barbiturates Screen, Urine 04/21/2022 Negative  Negative Final   • Oxycodone Screen, Urine 04/21/2022 Negative  Negative Final   • Propoxyphene Screen 04/21/2022 Negative  Negative Final   • Buprenorphine, Screen, Urine 04/21/2022 Negative  Negative Final   • Ethanol, Urine 04/21/2022 Negative  Cutoff=0.020 % Final   • Methadone 04/21/2022 +POSITIVE+   Final   • Methadone, Quantitative 04/21/2022 >6944  ng/mg creat Final   • EDDP 04/21/2022 >6944  ng/mg creat Final   • Level of Detection: 04/21/2022 Comment   Final    Testing Threshold:  50 ng/mL                                                                       '  This test was developed and its performance characteristics  determined by [facility].  It has not been cleared or approved  by the Food and Drug Administration.        Mental Status Exam  Hygiene:  good  Dress: casual  Attitude: cooperative and agreeable   Motor Activity: appropriate  Eye Contact:  good  Speech: regular rate and rhythm   Mood:  calm and cooperative  Affect:  Appropriate  Thought Processes:  Linear  Thought Content:  Normal  Suicidal Thoughts:  denies  Homicidal Thoughts:  denies  Crisis Safety Plan: yes, to come to the emergency room.  Hallucinations:  denies  Reliability: fair  Insight: fair  Judgement: fair  Impulse Control: fair    Family issues related to recovery:  No change, see previous encounters    Recovery/spiritual support group attendance: No     Sponsor: No       Motivation for treatment: Patient during initial assessment stated her motivation for treatment is “get my baby back” and start better life.    Patient's Support Network Includes: Patient during initial assessment reported that her sober support system is her sponsor, Mari.    Progress toward goal: Not at goal    Prognosis: Fair with Ongoing Treatment     Self-reported number of days sober: Patient reported 44 on check in form.     ASAM Dimensions:  I.    Intoxication/Withdrawal:  0  (Patient during initial assessment denied active withdrawal symptoms).  II.   Medical Conditions/Complications:  0 (Patient during initial assessment denied medical conditions).  III.  Behavioral/Emotional/Cognitive: 1 (Due to patient report of depression and anxiety during initial assessment).  IV.  Readiness to Change: 1 (Patient is ordered for treatment by [employer], but patient identified motivation for change during initial assessment).  V.   Relapse Risk: 2 (Due to patient report of recent substance use and report of two weeks longest being clean during initial assessment).  VI:  Recovery Environment: 1 (Patient reports she lives with a friend but reporting needs housing assistance and patient reports friend helping with transportation during initial assessment).  Total ASAM Score = 05      BASELINE  SCORES 04/07/2022       KATHY-7   (16)                  PHQ-9   (20)         Patient agreeable to adhere to medication regimen as prescribed and report any side effects. Patient will contact this office, call 911 or present to the nearest emergency room should suicidal or homicidal ideations occur.    Impression/Formulation:    ICD-10-CM ICD-9-CM   1. Methamphetamine use disorder, severe, dependence (HCC)  F15.20 304.40   2. Opioid use disorder, severe, dependence (HCC)  F11.20 304.00       CLINICAL MANUVERING/INTERVENTIONS: Therapist utilized a person-centered approach to build rapport with group member.  Therapist implemented motivational interviewing techniques to assist client with exploring and resolving ambivalence associated with commitment to change behaviors related to substance use and addiction.  Therapist applied cognitive behavioral strategies to facilitate identification of maladaptive patterns of thinking and behavior that contribute to client’s risk for continued substance use and relapse.  Therapist employed group interaction activities to build rapport among group members, promote sobriety, and emphasize relapse prevention.  Therapist promoted safe nonjudgmental environment by providing group members with unconditional positive regard and encouraging group members to comply with group rules and guidelines. Therapist assisted group member with identifying and implementing healthier coping strategies.      PLAN:  Continue [facility] IOP Phase I   Aftercare:  [facility] Phase II  Program Assignments:  Personal recovery plan, relapse prevention plan, attendance of recovery support group meetings, exploration of sponsorship, drug/alcohol screens.     Please note that portions of this note were completed with a voice recognition program. Efforts were made to edit dictation, but occasionally words are mistranscribed.       This document signed by Amor  Raysa, May 19, 2022, 12:59 EDT

## 2022-05-20 LAB — ETHANOL UR-MCNC: NEGATIVE %

## 2022-05-23 ENCOUNTER — OFFICE VISIT (OUTPATIENT)
Dept: PSYCHIATRY | Facility: HOSPITAL | Age: 37
End: 2022-05-23

## 2022-05-23 DIAGNOSIS — F15.20 METHAMPHETAMINE USE DISORDER, SEVERE, DEPENDENCE: Primary | ICD-10-CM

## 2022-05-23 DIAGNOSIS — F11.20 OPIOID USE DISORDER, SEVERE, DEPENDENCE: ICD-10-CM

## 2022-05-23 PROCEDURE — H0015 ALCOHOL AND/OR DRUG SERVICES: HCPCS | Performed by: NURSE PRACTITIONER

## 2022-05-23 NOTE — PROGRESS NOTES
NAME: Ada Vogt  DATE: 05/11/2022     IOP GROUP   [id number]    Number of participants: 7  IOP GROUP NOTE     DATA:     3 hour IOP group therapy session (Check-ins, Coping Skills, Relapse Prevention)     Check Ins: Therapist continued facilitation of rapport building strategies between group members. Therapist asked that each patient check in with home life and recovery efforts and identify triggers, cravings, and high risk situations that arise between group sessions. Therapist provided empathy and support during group session.     Session Content/Coping Skills: Check in forms completed and shared by group members. [employer]nih.gov website Naloxone drug facts reviewed. Clinician discussed with group members the [employer] law in [place]. [employer] video Addiction Heroin: Ata’s Story viewed and discussed.     Response: Client attended class in person. Client participated in completion of check in form.   Client on check in form denied suicidal thoughts or plan or intent to hurt self currently or since last group meeting. Client on check in form denied homicidal thoughts or plan or intent to hurt self or others currently or since last group meeting. Client participated in group discussions and viewing and discussing YouTube video Addiction Heroin: Ata’s Story.     Personal Assessment 0-10 Scale (10 worst)    Anxiety:  5   Depression:  6   Cravings: 3     ASSESSMENT:     ..  Lab on 05/10/2022   Component Date Value Ref Range Status   • THC, Screen, Urine 05/10/2022 Negative  Negative Final   • Phencyclidine (PCP), Urine 05/10/2022 Negative  Negative Final   • Cocaine Screen, Urine 05/10/2022 Negative  Negative Final   • Methamphetamine, Ur 05/10/2022 Negative  Negative Final   • Opiate Screen 05/10/2022 Negative  Negative Final   • Amphetamine Screen, Urine 05/10/2022 Negative  Negative Final   • Benzodiazepine Screen, Urine 05/10/2022 Negative  Negative Final   • Tricyclic Antidepressants Screen 05/10/2022  Negative  Negative Final   • Methadone Screen, Urine 05/10/2022 Positive (A) Negative Final   • Barbiturates Screen, Urine 05/10/2022 Negative  Negative Final   • Oxycodone Screen, Urine 05/10/2022 Negative  Negative Final   • Propoxyphene Screen 05/10/2022 Negative  Negative Final   • Buprenorphine, Screen, Urine 05/10/2022 Negative  Negative Final   • Ethanol, Urine 05/10/2022 Negative  Cutoff=0.020 % Final   • Methadone 05/10/2022 +POSITIVE+   Final   • Methadone, Quantitative 05/10/2022 4830  ng/mg creat Final   • EDDP 05/10/2022 >7246  ng/mg creat Final   • Level of Detection: 05/10/2022 Comment   Final    Testing Threshold:  50 ng/mL                                                                       '  This test was developed and its performance characteristics  determined by [facility].  It has not been cleared or approved  by the Food and Drug Administration.   Lab on 05/04/2022   Component Date Value Ref Range Status   • Ethanol, Urine 05/04/2022 Negative  Cutoff=0.020 % Final   • THC, Screen, Urine 05/04/2022 Negative  Negative Final   • Phencyclidine (PCP), Urine 05/04/2022 Negative  Negative Final   • Cocaine Screen, Urine 05/04/2022 Negative  Negative Final   • Methamphetamine, Ur 05/04/2022 Negative  Negative Final   • Opiate Screen 05/04/2022 Negative  Negative Final   • Amphetamine Screen, Urine 05/04/2022 Negative  Negative Final   • Benzodiazepine Screen, Urine 05/04/2022 Negative  Negative Final   • Tricyclic Antidepressants Screen 05/04/2022 Negative  Negative Final   • Methadone Screen, Urine 05/04/2022 Positive (A) Negative Final   • Barbiturates Screen, Urine 05/04/2022 Negative  Negative Final   • Oxycodone Screen, Urine 05/04/2022 Negative  Negative Final   • Propoxyphene Screen 05/04/2022 Negative  Negative Final   • Buprenorphine, Screen, Urine 05/04/2022 Negative  Negative Final   • Methadone 05/04/2022 +POSITIVE+   Final   • Methadone, Quantitative 05/04/2022 2811  ng/mg creat Final   •  EDDP 05/04/2022 >26895  ng/mg creat Final   • Level of Detection: 05/04/2022 Comment   Final    Testing Threshold:  50 ng/mL                                                                       '  This test was developed and its performance characteristics  determined by [facility].  It has not been cleared or approved  by the Food and Drug Administration.   Lab on 04/27/2022   Component Date Value Ref Range Status   • THC, Screen, Urine 04/27/2022 Negative  Negative Final   • Phencyclidine (PCP), Urine 04/27/2022 Negative  Negative Final   • Cocaine Screen, Urine 04/27/2022 Negative  Negative Final   • Methamphetamine, Ur 04/27/2022 Negative  Negative Final   • Opiate Screen 04/27/2022 Negative  Negative Final   • Amphetamine Screen, Urine 04/27/2022 Negative  Negative Final   • Benzodiazepine Screen, Urine 04/27/2022 Negative  Negative Final   • Tricyclic Antidepressants Screen 04/27/2022 Negative  Negative Final   • Methadone Screen, Urine 04/27/2022 Positive (A) Negative Final   • Barbiturates Screen, Urine 04/27/2022 Negative  Negative Final   • Oxycodone Screen, Urine 04/27/2022 Negative  Negative Final   • Propoxyphene Screen 04/27/2022 Negative  Negative Final   • Buprenorphine, Screen, Urine 04/27/2022 Negative  Negative Final   • Ethanol, Urine 04/27/2022 Negative  Cutoff=0.020 % Final   • Methadone 04/27/2022 +POSITIVE+   Final   • Methadone, Quantitative 04/27/2022 1979  ng/mg creat Final   • EDDP 04/27/2022 3983  ng/mg creat Final   • Level of Detection: 04/27/2022 Comment   Final    Testing Threshold:  50 ng/mL                                                                       '  This test was developed and its performance characteristics  determined by [facility].  It has not been cleared or approved  by the Food and Drug Administration.   Lab on 04/21/2022   Component Date Value Ref Range Status   • THC, Screen, Urine 04/21/2022 Negative  Negative Final   • Phencyclidine (PCP), Urine 04/21/2022  Negative  Negative Final   • Cocaine Screen, Urine 04/21/2022 Negative  Negative Final   • Methamphetamine, Ur 04/21/2022 Negative  Negative Final   • Opiate Screen 04/21/2022 Negative  Negative Final   • Amphetamine Screen, Urine 04/21/2022 Negative  Negative Final   • Benzodiazepine Screen, Urine 04/21/2022 Negative  Negative Final   • Tricyclic Antidepressants Screen 04/21/2022 Negative  Negative Final   • Methadone Screen, Urine 04/21/2022 Positive (A) Negative Final   • Barbiturates Screen, Urine 04/21/2022 Negative  Negative Final   • Oxycodone Screen, Urine 04/21/2022 Negative  Negative Final   • Propoxyphene Screen 04/21/2022 Negative  Negative Final   • Buprenorphine, Screen, Urine 04/21/2022 Negative  Negative Final   • Ethanol, Urine 04/21/2022 Negative  Cutoff=0.020 % Final   • Methadone 04/21/2022 +POSITIVE+   Final   • Methadone, Quantitative 04/21/2022 >6944  ng/mg creat Final   • EDDP 04/21/2022 >6944  ng/mg creat Final   • Level of Detection: 04/21/2022 Comment   Final    Testing Threshold:  50 ng/mL                                                                       '  This test was developed and its performance characteristics  determined by [facility].  It has not been cleared or approved  by the Food and Drug Administration.       Mental Status Exam  Hygiene:  good  Dress: casual  Attitude: cooperative and agreeable   Motor Activity: appropriate  Eye Contact:  good  Speech: regular rate and rhythm   Mood:  calm and cooperative  Affect:  Appropriate  Thought Processes:  Linear  Thought Content:  Normal  Suicidal Thoughts:  denies  Homicidal Thoughts:  denies  Crisis Safety Plan: yes, to come to the emergency room.  Hallucinations:  denies  Reliability: fair  Insight: fair  Judgement: fair  Impulse Control: fair    Family issues related to recovery:  No change, see previous encounters    Recovery/spiritual support group attendance: Yes, Patient reported she went to "[employer] .     Sponsor: No      Motivation for treatment: Patient during initial assessment stated her motivation for treatment is “get my baby back” and start better life.    Patient's Support Network Includes: Patient during initial assessment reported that her sober support system is her sponsor, Mari.    Progress toward goal: Not at goal    Prognosis: Fair with Ongoing Treatment     Self-reported number of days sober: Patient reported 46 on check in form.     ASAM Dimensions:  I.    Intoxication/Withdrawal:  0  (Patient during initial assessment denied active withdrawal symptoms).  II.   Medical Conditions/Complications:  0 (Patient during initial assessment denied medical conditions).  III.  Behavioral/Emotional/Cognitive: 1 (Due to patient report of depression and anxiety during initial assessment).  IV.  Readiness to Change: 1 (Patient is ordered for treatment by DCBS, but patient identified motivation for change during initial assessment).  V.   Relapse Risk: 2 (Due to patient report of recent substance use and report of two weeks longest being clean during initial assessment).  VI:  Recovery Environment: 1 (Patient reports she lives with a friend but reporting needs housing assistance and patient reports friend helping with transportation during initial assessment).  Total ASAM Score = 05      BASELINE SCORES 04/07/2022       KATHY-7   (16)                  PHQ-9   (20)         Patient agreeable to adhere to medication regimen as prescribed and report any side effects. Patient will contact this office, call 911 or present to the nearest emergency room should suicidal or homicidal ideations occur.    Impression/Formulation:    ICD-10-CM ICD-9-CM   1. Methamphetamine use disorder, severe, dependence (HCC)  F15.20 304.40   2. Opioid use disorder, severe, dependence (HCC)  F11.20 304.00       CLINICAL MANUVERING/INTERVENTIONS: Therapist utilized a person-centered approach to build rapport with group member.  Therapist implemented  motivational interviewing techniques to assist client with exploring and resolving ambivalence associated with commitment to change behaviors related to substance use and addiction.  Therapist applied cognitive behavioral strategies to facilitate identification of maladaptive patterns of thinking and behavior that contribute to client’s risk for continued substance use and relapse.  Therapist employed group interaction activities to build rapport among group members, promote sobriety, and emphasize relapse prevention.  Therapist promoted safe nonjudgmental environment by providing group members with unconditional positive regard and encouraging group members to comply with group rules and guidelines. Therapist assisted group member with identifying and implementing healthier coping strategies.      PLAN:  Continue [facility] IOP Phase I   Aftercare:  [facility] Phase II  Program Assignments:  Personal recovery plan, relapse prevention plan, attendance of recovery support group meetings, exploration of sponsorship, drug/alcohol screens.     Please note that portions of this note were completed with a voice recognition program. Efforts were made to edit dictation, but occasionally words are mistranscribed.       This document signed by Amor Tabares, May 23, 2022, 15:27 EDT

## 2022-05-23 NOTE — PROGRESS NOTES
NAME: Ada Votg  DATE: 05/12/2022    [facility] GROUP   [id number]    Number of participants: 8  IOP GROUP NOTE     DATA:     3 hour IOP group therapy session (Check-ins, Coping Skills, Relapse Prevention)     Check Ins: Therapist continued facilitation of rapport building strategies between group members. Therapist asked that each patient check in with home life and recovery efforts and identify triggers, cravings, and high risk situations that arise between group sessions. Therapist provided empathy and support during group session.     Session Content/Coping Skills: Introductions completed. Check ins completed by group members. Clinician began review of Living in Balance, Stress and Emotional Health. Group members participated in [facility] psychoeducational activity. Clinician provided Narcan’s to group members.     Response: Client attended class in person. Client participated in completion of check in form.   Client on check in form denied suicidal thoughts or plan or intent to hurt self currently or since last group meeting. Client on check in form denied homicidal thoughts or plan or intent to hurt self or others currently or since last group meeting. Client participated in group discussions and viewing and discussing [employer] [facility] video Addiction Heroin: Ata’s Story.     Personal Assessment 0-10 Scale (10 worst)    Anxiety:  3   Depression:  5   Cravings: 2     ASSESSMENT:     ..  Lab on 05/10/2022   Component Date Value Ref Range Status   • THC, Screen, Urine 05/10/2022 Negative  Negative Final   • Phencyclidine (PCP), Urine 05/10/2022 Negative  Negative Final   • Cocaine Screen, Urine 05/10/2022 Negative  Negative Final   • Methamphetamine, Ur 05/10/2022 Negative  Negative Final   • Opiate Screen 05/10/2022 Negative  Negative Final   • Amphetamine Screen, Urine 05/10/2022 Negative  Negative Final   • Benzodiazepine Screen, Urine 05/10/2022 Negative  Negative Final   • Tricyclic Antidepressants Screen  05/10/2022 Negative  Negative Final   • Methadone Screen, Urine 05/10/2022 Positive (A) Negative Final   • Barbiturates Screen, Urine 05/10/2022 Negative  Negative Final   • Oxycodone Screen, Urine 05/10/2022 Negative  Negative Final   • Propoxyphene Screen 05/10/2022 Negative  Negative Final   • Buprenorphine, Screen, Urine 05/10/2022 Negative  Negative Final   • Ethanol, Urine 05/10/2022 Negative  Cutoff=0.020 % Final   • Methadone 05/10/2022 +POSITIVE+   Final   • Methadone, Quantitative 05/10/2022 4830  ng/mg creat Final   • EDDP 05/10/2022 >7246  ng/mg creat Final   • Level of Detection: 05/10/2022 Comment   Final    Testing Threshold:  50 ng/mL                                                                       '  This test was developed and its performance characteristics  determined by [facility].  It has not been cleared or approved  by the Food and Drug Administration.   Lab on 05/04/2022   Component Date Value Ref Range Status   • Ethanol, Urine 05/04/2022 Negative  Cutoff=0.020 % Final   • THC, Screen, Urine 05/04/2022 Negative  Negative Final   • Phencyclidine (PCP), Urine 05/04/2022 Negative  Negative Final   • Cocaine Screen, Urine 05/04/2022 Negative  Negative Final   • Methamphetamine, Ur 05/04/2022 Negative  Negative Final   • Opiate Screen 05/04/2022 Negative  Negative Final   • Amphetamine Screen, Urine 05/04/2022 Negative  Negative Final   • Benzodiazepine Screen, Urine 05/04/2022 Negative  Negative Final   • Tricyclic Antidepressants Screen 05/04/2022 Negative  Negative Final   • Methadone Screen, Urine 05/04/2022 Positive (A) Negative Final   • Barbiturates Screen, Urine 05/04/2022 Negative  Negative Final   • Oxycodone Screen, Urine 05/04/2022 Negative  Negative Final   • Propoxyphene Screen 05/04/2022 Negative  Negative Final   • Buprenorphine, Screen, Urine 05/04/2022 Negative  Negative Final   • Methadone 05/04/2022 +POSITIVE+   Final   • Methadone, Quantitative 05/04/2022 2811  ng/mg creat  Final   • EDDP 05/04/2022 >97879  ng/mg creat Final   • Level of Detection: 05/04/2022 Comment   Final    Testing Threshold:  50 ng/mL                                                                       '  This test was developed and its performance characteristics  determined by [facility].  It has not been cleared or approved  by the Food and Drug Administration.   Lab on 04/27/2022   Component Date Value Ref Range Status   • THC, Screen, Urine 04/27/2022 Negative  Negative Final   • Phencyclidine (PCP), Urine 04/27/2022 Negative  Negative Final   • Cocaine Screen, Urine 04/27/2022 Negative  Negative Final   • Methamphetamine, Ur 04/27/2022 Negative  Negative Final   • Opiate Screen 04/27/2022 Negative  Negative Final   • Amphetamine Screen, Urine 04/27/2022 Negative  Negative Final   • Benzodiazepine Screen, Urine 04/27/2022 Negative  Negative Final   • Tricyclic Antidepressants Screen 04/27/2022 Negative  Negative Final   • Methadone Screen, Urine 04/27/2022 Positive (A) Negative Final   • Barbiturates Screen, Urine 04/27/2022 Negative  Negative Final   • Oxycodone Screen, Urine 04/27/2022 Negative  Negative Final   • Propoxyphene Screen 04/27/2022 Negative  Negative Final   • Buprenorphine, Screen, Urine 04/27/2022 Negative  Negative Final   • Ethanol, Urine 04/27/2022 Negative  Cutoff=0.020 % Final   • Methadone 04/27/2022 +POSITIVE+   Final   • Methadone, Quantitative 04/27/2022 1979  ng/mg creat Final   • EDDP 04/27/2022 3983  ng/mg creat Final   • Level of Detection: 04/27/2022 Comment   Final    Testing Threshold:  50 ng/mL                                                                       '  This test was developed and its performance characteristics  determined by [facility].  It has not been cleared or approved  by the Food and Drug Administration.       Mental Status Exam  Hygiene:  good  Dress: casual  Attitude: cooperative and agreeable   Motor Activity: appropriate  Eye Contact:  good  Speech:  regular rate and rhythm   Mood:  calm and cooperative  Affect:  Appropriate  Thought Processes:  Linear  Thought Content:  Normal  Suicidal Thoughts:  denies  Homicidal Thoughts:  denies  Crisis Safety Plan: yes, to come to the emergency room.  Hallucinations:  denies  Reliability: fair  Insight: fair  Judgement: fair  Impulse Control: fair    Family issues related to recovery:  No change, see previous encounters    Recovery/spiritual support group attendance: No     Sponsor: No     Motivation for treatment: Patient during initial assessment stated her motivation for treatment is “get my baby back” and start better life.    Patient's Support Network Includes: Patient during initial assessment reported that her sober support system is her sponsor, Mari.    Progress toward goal: Not at goal    Prognosis: Fair with Ongoing Treatment     Self-reported number of days sober: Patient reported 48 on check in form.     ASAM Dimensions:  I.    Intoxication/Withdrawal:  0  (Patient during initial assessment denied active withdrawal symptoms).  II.   Medical Conditions/Complications:  0 (Patient during initial assessment denied medical conditions).  III.  Behavioral/Emotional/Cognitive: 1 (Due to patient report of depression and anxiety during initial assessment).  IV.  Readiness to Change: 1 (Patient is ordered for treatment by DCBS, but patient identified motivation for change during initial assessment).  V.   Relapse Risk: 2 (Due to patient report of recent substance use and report of two weeks longest being clean during initial assessment).  VI:  Recovery Environment: 1 (Patient reports she lives with a friend but reporting needs housing assistance and patient reports friend helping with transportation during initial assessment).  Total ASAM Score = 05      BASELINE SCORES 04/07/2022       KATHY-7   (16)                  PHQ-9   (20)         Patient agreeable to adhere to medication regimen as prescribed and report any  side effects. Patient will contact this office, call 911 or present to the nearest emergency room should suicidal or homicidal ideations occur.    Impression/Formulation:    ICD-10-CM ICD-9-CM   1. Methamphetamine use disorder, severe, dependence (HCC)  F15.20 304.40   2. Opioid use disorder, severe, dependence (HCC)  F11.20 304.00       CLINICAL MANUVERING/INTERVENTIONS: Therapist utilized a person-centered approach to build rapport with group member.  Therapist implemented motivational interviewing techniques to assist client with exploring and resolving ambivalence associated with commitment to change behaviors related to substance use and addiction.  Therapist applied cognitive behavioral strategies to facilitate identification of maladaptive patterns of thinking and behavior that contribute to client’s risk for continued substance use and relapse.  Therapist employed group interaction activities to build rapport among group members, promote sobriety, and emphasize relapse prevention.  Therapist promoted safe nonjudgmental environment by providing group members with unconditional positive regard and encouraging group members to comply with group rules and guidelines. Therapist assisted group member with identifying and implementing healthier coping strategies.      PLAN:  Continue [facility] IOP Phase I   Aftercare:  [facility] Phase II  Program Assignments:  Personal recovery plan, relapse prevention plan, attendance of recovery support group meetings, exploration of sponsorship, drug/alcohol screens.     Please note that portions of this note were completed with a voice recognition program. Efforts were made to edit dictation, but occasionally words are mistranscribed.       This document signed by Amor Tabares, May 23, 2022, 16:26 EDT

## 2022-05-25 ENCOUNTER — LAB (OUTPATIENT)
Dept: LAB | Facility: HOSPITAL | Age: 37
End: 2022-05-25

## 2022-05-25 ENCOUNTER — OFFICE VISIT (OUTPATIENT)
Dept: PSYCHIATRY | Facility: HOSPITAL | Age: 37
End: 2022-05-25

## 2022-05-25 DIAGNOSIS — F15.20 METHAMPHETAMINE USE DISORDER, SEVERE, DEPENDENCE: Primary | ICD-10-CM

## 2022-05-25 DIAGNOSIS — F11.20 OPIOID USE DISORDER, SEVERE, DEPENDENCE: ICD-10-CM

## 2022-05-25 DIAGNOSIS — F15.20 METHAMPHETAMINE USE DISORDER, SEVERE, DEPENDENCE: ICD-10-CM

## 2022-05-25 PROCEDURE — G0480 DRUG TEST DEF 1-7 CLASSES: HCPCS

## 2022-05-25 PROCEDURE — H0015 ALCOHOL AND/OR DRUG SERVICES: HCPCS | Performed by: NURSE PRACTITIONER

## 2022-05-25 PROCEDURE — 80307 DRUG TEST PRSMV CHEM ANLYZR: CPT

## 2022-05-25 NOTE — PROGRESS NOTES
NAME: Ada Vogt  DATE: 05/18/2022     IOP GROUP   [id number]    Number of participants: 6  IOP GROUP NOTE     DATA:     3 hour IOP group therapy session (Check-ins, Coping Skills, Relapse Prevention)     Check Ins: Therapist continued facilitation of rapport building strategies between group members. Therapist asked that each patient check in with home life and recovery efforts and identify triggers, cravings, and high risk situations that arise between group sessions. Therapist provided empathy and support during group session.     Session Content/Coping Skills: Check in’s completed by group members. Clinician educated group members on Maslow’s hierarchy of needs and how this applies to addiction recovery. Group members explored how lower level of needs were not being fulfilled while in active addiction. Resource packet was reviewed with group members and clinician educated group members on the many resources available in the community. Clinician discussed with group members the importance of parallel treatment.    Response: Patient attended class in person. Patient participated in completion of check in form. Patient reported on check in form that she had attended two meetings on the in the rooms [place].   Patient on check in form denied suicidal or homicidal thoughts or plan or intent to hurt self or others currently or since last group meeting. Patient participated in group discussion and review of Maslow’s hierarchy of needs and discussion of resources.     Personal Assessment 0-10 Scale (10 worst)    Anxiety:  3   Depression:  4   Cravings: 1     ASSESSMENT:     ..  Lab on 05/18/2022   Component Date Value Ref Range Status   • THC, Screen, Urine 05/18/2022 Negative  Negative Final   • Phencyclidine (PCP), Urine 05/18/2022 Negative  Negative Final   • Cocaine Screen, Urine 05/18/2022 Negative  Negative Final   • Methamphetamine, Ur 05/18/2022 Negative  Negative Final   • Opiate Screen 05/18/2022 Negative   Negative Final   • Amphetamine Screen, Urine 05/18/2022 Negative  Negative Final   • Benzodiazepine Screen, Urine 05/18/2022 Negative  Negative Final   • Tricyclic Antidepressants Screen 05/18/2022 Negative  Negative Final   • Methadone Screen, Urine 05/18/2022 Positive (A) Negative Final   • Barbiturates Screen, Urine 05/18/2022 Negative  Negative Final   • Oxycodone Screen, Urine 05/18/2022 Negative  Negative Final   • Propoxyphene Screen 05/18/2022 Negative  Negative Final   • Buprenorphine, Screen, Urine 05/18/2022 Negative  Negative Final   • Ethanol, Urine 05/18/2022 Negative  Cutoff=0.020 % Final   Lab on 05/10/2022   Component Date Value Ref Range Status   • THC, Screen, Urine 05/10/2022 Negative  Negative Final   • Phencyclidine (PCP), Urine 05/10/2022 Negative  Negative Final   • Cocaine Screen, Urine 05/10/2022 Negative  Negative Final   • Methamphetamine, Ur 05/10/2022 Negative  Negative Final   • Opiate Screen 05/10/2022 Negative  Negative Final   • Amphetamine Screen, Urine 05/10/2022 Negative  Negative Final   • Benzodiazepine Screen, Urine 05/10/2022 Negative  Negative Final   • Tricyclic Antidepressants Screen 05/10/2022 Negative  Negative Final   • Methadone Screen, Urine 05/10/2022 Positive (A) Negative Final   • Barbiturates Screen, Urine 05/10/2022 Negative  Negative Final   • Oxycodone Screen, Urine 05/10/2022 Negative  Negative Final   • Propoxyphene Screen 05/10/2022 Negative  Negative Final   • Buprenorphine, Screen, Urine 05/10/2022 Negative  Negative Final   • Ethanol, Urine 05/10/2022 Negative  Cutoff=0.020 % Final   • Methadone 05/10/2022 +POSITIVE+   Final   • Methadone, Quantitative 05/10/2022 4830  ng/mg creat Final   • EDDP 05/10/2022 >7246  ng/mg creat Final   • Level of Detection: 05/10/2022 Comment   Final    Testing Threshold:  50 ng/mL                                                                       '  This test was developed and its performance characteristics  determined  by [facility].  It has not been cleared or approved  by the Food and Drug Administration.   Lab on 05/04/2022   Component Date Value Ref Range Status   • Ethanol, Urine 05/04/2022 Negative  Cutoff=0.020 % Final   • THC, Screen, Urine 05/04/2022 Negative  Negative Final   • Phencyclidine (PCP), Urine 05/04/2022 Negative  Negative Final   • Cocaine Screen, Urine 05/04/2022 Negative  Negative Final   • Methamphetamine, Ur 05/04/2022 Negative  Negative Final   • Opiate Screen 05/04/2022 Negative  Negative Final   • Amphetamine Screen, Urine 05/04/2022 Negative  Negative Final   • Benzodiazepine Screen, Urine 05/04/2022 Negative  Negative Final   • Tricyclic Antidepressants Screen 05/04/2022 Negative  Negative Final   • Methadone Screen, Urine 05/04/2022 Positive (A) Negative Final   • Barbiturates Screen, Urine 05/04/2022 Negative  Negative Final   • Oxycodone Screen, Urine 05/04/2022 Negative  Negative Final   • Propoxyphene Screen 05/04/2022 Negative  Negative Final   • Buprenorphine, Screen, Urine 05/04/2022 Negative  Negative Final   • Methadone 05/04/2022 +POSITIVE+   Final   • Methadone, Quantitative 05/04/2022 2811  ng/mg creat Final   • EDDP 05/04/2022 >94987  ng/mg creat Final   • Level of Detection: 05/04/2022 Comment   Final    Testing Threshold:  50 ng/mL                                                                       '  This test was developed and its performance characteristics  determined by [facility].  It has not been cleared or approved  by the Food and Drug Administration.       Mental Status Exam  Hygiene:  good  Dress: casual  Attitude: cooperative and agreeable   Motor Activity: appropriate  Eye Contact:  good  Speech: regular rate and rhythm   Mood:  calm and cooperative  Affect:  Appropriate  Thought Processes:  Linear  Thought Content:  Normal  Suicidal Thoughts:  denies  Homicidal Thoughts:  denies  Crisis Safety Plan: yes, to come to the emergency room.  Hallucinations:  denies  Reliability:  fair  Insight: fair  Judgement: fair  Impulse Control: fair    Family issues related to recovery:  No change, see previous encounters    Recovery/spiritual support group attendance: Yes     Sponsor: No     Motivation for treatment: Patient during initial assessment stated her motivation for treatment is “get my baby back” and start better life.    Patient's Support Network Includes: Patient during initial assessment reported that her sober support system is her sponsor, Mari.    Progress toward goal: Not at goal    Prognosis: Fair with Ongoing Treatment     Self-reported number of days sober: Patient reported 53 on check in form.     ASAM Dimensions:  I.    Intoxication/Withdrawal:  0  (Patient during initial assessment denied active withdrawal symptoms).  II.   Medical Conditions/Complications:  0 (Patient during initial assessment denied medical conditions).  III.  Behavioral/Emotional/Cognitive: 1 (Due to patient report of depression and anxiety during initial assessment).  IV.  Readiness to Change: 1 (Patient is ordered for treatment by DCBS, but patient identified motivation for change during initial assessment).  V.   Relapse Risk: 2 (Due to patient report of recent substance use and report of two weeks longest being clean during initial assessment).  VI:  Recovery Environment: 1 (Patient reports she lives with a friend but reporting needs housing assistance and patient reports friend helping with transportation during initial assessment).  Total ASAM Score = 05      BASELINE SCORES 04/07/2022       KATHY-7   (16)                  PHQ-9   (20)         Patient agreeable to adhere to medication regimen as prescribed and report any side effects. Patient will contact this office, call 911 or present to the nearest emergency room should suicidal or homicidal ideations occur.    Impression/Formulation:    ICD-10-CM ICD-9-CM   1. Methamphetamine use disorder, severe, dependence (HCC)  F15.20 304.40   2. Opioid use  disorder, severe, dependence ([facility])  F11.20 304.00       CLINICAL MANUVERING/INTERVENTIONS: Therapist utilized a person-centered approach to build rapport with group member.  Therapist implemented motivational interviewing techniques to assist client with exploring and resolving ambivalence associated with commitment to change behaviors related to substance use and addiction.  Therapist applied cognitive behavioral strategies to facilitate identification of maladaptive patterns of thinking and behavior that contribute to client’s risk for continued substance use and relapse.  Therapist employed group interaction activities to build rapport among group members, promote sobriety, and emphasize relapse prevention.  Therapist promoted safe nonjudgmental environment by providing group members with unconditional positive regard and encouraging group members to comply with group rules and guidelines. Therapist assisted group member with identifying and implementing healthier coping strategies.      PLAN:  Continue [facility] IOP Phase I   Aftercare:  [facility] Phase II  Program Assignments:  Personal recovery plan, relapse prevention plan, attendance of recovery support group meetings, exploration of sponsorship, drug/alcohol screens.     Please note that portions of this note were completed with a voice recognition program. Efforts were made to edit dictation, but occasionally words are mistranscribed.       This document signed by Amor Tabares, May 25, 2022, 16:07 EDT

## 2022-05-25 NOTE — PROGRESS NOTES
NAME: Ada Vogt  DATE: 05/16/2022     IOP GROUP   [id number]    Number of participants: 6  IOP GROUP NOTE     DATA:     3 hour IOP group therapy session (Check-ins, Coping Skills, Relapse Prevention)     Check Ins: Therapist continued facilitation of rapport building strategies between group members. Therapist asked that each patient check in with home life and recovery efforts and identify triggers, cravings, and high risk situations that arise between group sessions. Therapist provided empathy and support during group session.     Session Content/Coping Skills: Check ins completed by group members. Clinician processed stressors with group members. [facility] psychoeducational material reviewed. Clinician educated group members on internal and external triggers and group members explored and identified their triggers.      Response: Patient attended CD-IOP in person. Patient participated in completion of check in form.   Patient on check in form denied suicidal or homicidal thoughts or plan or intent to hurt self or others currently or since last group meeting. Patient reported on check in form that she had attended a second traditions meeting. Patient also reported on check in form that she has family court tomorrow. Patient participated in group discussions and review of psychoeducational material.     Personal Assessment 0-10 Scale (10 worst)    Anxiety:  7   Depression:  5   Cravings: 3     ASSESSMENT:     ..  Lab on 05/10/2022   Component Date Value Ref Range Status   • THC, Screen, Urine 05/10/2022 Negative  Negative Final   • Phencyclidine (PCP), Urine 05/10/2022 Negative  Negative Final   • Cocaine Screen, Urine 05/10/2022 Negative  Negative Final   • Methamphetamine, Ur 05/10/2022 Negative  Negative Final   • Opiate Screen 05/10/2022 Negative  Negative Final   • Amphetamine Screen, Urine 05/10/2022 Negative  Negative Final   • Benzodiazepine Screen, Urine 05/10/2022 Negative  Negative Final    • Tricyclic Antidepressants Screen 05/10/2022 Negative  Negative Final   • Methadone Screen, Urine 05/10/2022 Positive (A) Negative Final   • Barbiturates Screen, Urine 05/10/2022 Negative  Negative Final   • Oxycodone Screen, Urine 05/10/2022 Negative  Negative Final   • Propoxyphene Screen 05/10/2022 Negative  Negative Final   • Buprenorphine, Screen, Urine 05/10/2022 Negative  Negative Final   • Ethanol, Urine 05/10/2022 Negative  Cutoff=0.020 % Final   • Methadone 05/10/2022 +POSITIVE+   Final   • Methadone, Quantitative 05/10/2022 4830  ng/mg creat Final   • EDDP 05/10/2022 >7246  ng/mg creat Final   • Level of Detection: 05/10/2022 Comment   Final    Testing Threshold:  50 ng/mL                                                                       '  This test was developed and its performance characteristics  determined by [facility].  It has not been cleared or approved  by the Food and Drug Administration.   Lab on 05/04/2022   Component Date Value Ref Range Status   • Ethanol, Urine 05/04/2022 Negative  Cutoff=0.020 % Final   • THC, Screen, Urine 05/04/2022 Negative  Negative Final   • Phencyclidine (PCP), Urine 05/04/2022 Negative  Negative Final   • Cocaine Screen, Urine 05/04/2022 Negative  Negative Final   • Methamphetamine, Ur 05/04/2022 Negative  Negative Final   • Opiate Screen 05/04/2022 Negative  Negative Final   • Amphetamine Screen, Urine 05/04/2022 Negative  Negative Final   • Benzodiazepine Screen, Urine 05/04/2022 Negative  Negative Final   • Tricyclic Antidepressants Screen 05/04/2022 Negative  Negative Final   • Methadone Screen, Urine 05/04/2022 Positive (A) Negative Final   • Barbiturates Screen, Urine 05/04/2022 Negative  Negative Final   • Oxycodone Screen, Urine 05/04/2022 Negative  Negative Final   • Propoxyphene Screen 05/04/2022 Negative  Negative Final   • Buprenorphine, Screen, Urine 05/04/2022 Negative  Negative Final   • Methadone 05/04/2022 +POSITIVE+   Final   • Methadone,  Quantitative 05/04/2022 2811  ng/mg creat Final   • EDDP 05/04/2022 >44847  ng/mg creat Final   • Level of Detection: 05/04/2022 Comment   Final    Testing Threshold:  50 ng/mL                                                                       '  This test was developed and its performance characteristics  determined by [facility].  It has not been cleared or approved  by the Food and Drug Administration.   Lab on 04/27/2022   Component Date Value Ref Range Status   • THC, Screen, Urine 04/27/2022 Negative  Negative Final   • Phencyclidine (PCP), Urine 04/27/2022 Negative  Negative Final   • Cocaine Screen, Urine 04/27/2022 Negative  Negative Final   • Methamphetamine, Ur 04/27/2022 Negative  Negative Final   • Opiate Screen 04/27/2022 Negative  Negative Final   • Amphetamine Screen, Urine 04/27/2022 Negative  Negative Final   • Benzodiazepine Screen, Urine 04/27/2022 Negative  Negative Final   • Tricyclic Antidepressants Screen 04/27/2022 Negative  Negative Final   • Methadone Screen, Urine 04/27/2022 Positive (A) Negative Final   • Barbiturates Screen, Urine 04/27/2022 Negative  Negative Final   • Oxycodone Screen, Urine 04/27/2022 Negative  Negative Final   • Propoxyphene Screen 04/27/2022 Negative  Negative Final   • Buprenorphine, Screen, Urine 04/27/2022 Negative  Negative Final   • Ethanol, Urine 04/27/2022 Negative  Cutoff=0.020 % Final   • Methadone 04/27/2022 +POSITIVE+   Final   • Methadone, Quantitative 04/27/2022 1979  ng/mg creat Final   • EDDP 04/27/2022 3983  ng/mg creat Final   • Level of Detection: 04/27/2022 Comment   Final    Testing Threshold:  50 ng/mL                                                                       '  This test was developed and its performance characteristics  determined by [facility].  It has not been cleared or approved  by the Food and Drug Administration.       Mental Status Exam  Hygiene:  good  Dress: casual  Attitude: cooperative and agreeable   Motor Activity:  appropriate  Eye Contact:  good  Speech: regular rate and rhythm   Mood:  calm and cooperative  Affect:  Appropriate  Thought Processes:  Linear  Thought Content:  Normal  Suicidal Thoughts:  denies  Homicidal Thoughts:  denies  Crisis Safety Plan: yes, to come to the emergency room.  Hallucinations:  denies  Reliability: fair  Insight: fair  Judgement: fair  Impulse Control: fair    Family issues related to recovery:  No change, see previous encounters    Recovery/spiritual support group attendance: Yes     Sponsor: No      Motivation for treatment: Patient during initial assessment stated her motivation for treatment is “get my baby back” and start better life.    Patient's Support Network Includes: Patient during initial assessment reported that her sober support system is her sponsor, Mari.    Progress toward goal: Not at goal    Prognosis: Fair with Ongoing Treatment     Self-reported number of days sober: Patient reported 51 on check in form.     ASAM Dimensions:  I.    Intoxication/Withdrawal:  0  (Patient during initial assessment denied active withdrawal symptoms).  II.   Medical Conditions/Complications:  0 (Patient during initial assessment denied medical conditions).  III.  Behavioral/Emotional/Cognitive: 1 (Due to patient report of depression and anxiety during initial assessment).  IV.  Readiness to Change: 1 (Patient is ordered for treatment by DCBS, but patient identified motivation for change during initial assessment).  V.   Relapse Risk: 2 (Due to patient report of recent substance use and report of two weeks longest being clean during initial assessment).  VI:  Recovery Environment: 1 (Patient reports she lives with a friend but reporting needs housing assistance and patient reports friend helping with transportation during initial assessment).  Total ASAM Score = 05      BASELINE SCORES 04/07/2022       KATHY-7   (16)                  PHQ-9   (20)           Patient agreeable to adhere to  medication regimen as prescribed and report any side effects. Patient will contact this office, call 911 or present to the nearest emergency room should suicidal or homicidal ideations occur.    Impression/Formulation:    ICD-10-CM ICD-9-CM   1. Methamphetamine use disorder, severe, dependence (HCC)  F15.20 304.40   2. Opioid use disorder, severe, dependence (HCC)  F11.20 304.00       CLINICAL MANUVERING/INTERVENTIONS: Therapist utilized a person-centered approach to build rapport with group member.  Therapist implemented motivational interviewing techniques to assist client with exploring and resolving ambivalence associated with commitment to change behaviors related to substance use and addiction.  Therapist applied cognitive behavioral strategies to facilitate identification of maladaptive patterns of thinking and behavior that contribute to client’s risk for continued substance use and relapse.  Therapist employed group interaction activities to build rapport among group members, promote sobriety, and emphasize relapse prevention.  Therapist promoted safe nonjudgmental environment by providing group members with unconditional positive regard and encouraging group members to comply with group rules and guidelines. Therapist assisted group member with identifying and implementing healthier coping strategies.      PLAN:  Continue [facility] IOP Phase I   Aftercare:  [facility] Phase II  Program Assignments:  Personal recovery plan, relapse prevention plan, attendance of recovery support group meetings, exploration of sponsorship, drug/alcohol screens.     Please note that portions of this note were completed with a voice recognition program. Efforts were made to edit dictation, but occasionally words are mistranscribed.       This document signed by Amor Tabares, May 25, 2022, 12:39 EDT

## 2022-05-26 ENCOUNTER — OFFICE VISIT (OUTPATIENT)
Dept: PSYCHIATRY | Facility: HOSPITAL | Age: 37
End: 2022-05-26

## 2022-05-26 DIAGNOSIS — F11.20 OPIOID USE DISORDER, SEVERE, DEPENDENCE: ICD-10-CM

## 2022-05-26 DIAGNOSIS — F15.20 METHAMPHETAMINE USE DISORDER, SEVERE, DEPENDENCE: Primary | ICD-10-CM

## 2022-05-26 LAB
EDDP/CREAT UR: >9434 NG/MG CREAT
LEVEL OF DETECTION:: NORMAL
METHADONE UR QL CFM: NORMAL
METHADONE/CREAT UR: >9434 NG/MG CREAT

## 2022-05-26 PROCEDURE — H0015 ALCOHOL AND/OR DRUG SERVICES: HCPCS | Performed by: NURSE PRACTITIONER

## 2022-05-27 LAB — ETHANOL UR-MCNC: NEGATIVE %

## 2022-05-30 ENCOUNTER — DOCUMENTATION (OUTPATIENT)
Dept: PSYCHIATRY | Facility: HOSPITAL | Age: 37
End: 2022-05-30

## 2022-05-30 ENCOUNTER — OFFICE VISIT (OUTPATIENT)
Dept: PSYCHIATRY | Facility: HOSPITAL | Age: 37
End: 2022-05-30

## 2022-05-30 DIAGNOSIS — F11.20 OPIOID USE DISORDER, SEVERE, DEPENDENCE: ICD-10-CM

## 2022-05-30 DIAGNOSIS — F15.20 METHAMPHETAMINE USE DISORDER, SEVERE, DEPENDENCE: Primary | ICD-10-CM

## 2022-05-30 PROCEDURE — H0015 ALCOHOL AND/OR DRUG SERVICES: HCPCS | Performed by: NURSE PRACTITIONER

## 2022-05-30 NOTE — PROGRESS NOTES
NAME: Ada Vogt  DATE: 05/25/2022     IOP GROUP   [id number]    Number of participants: 7  IOP GROUP NOTE     DATA:     3 hour IOP group therapy session (Check-ins, Coping Skills, Relapse Prevention)     Check Ins: Therapist continued facilitation of rapport building strategies between group members. Therapist asked that each patient check in with home life and recovery efforts and identify triggers, cravings, and high risk situations that arise between group sessions. Therapist provided empathy and support during group session.     Session Content/Coping Skills: Check in forms completed by group members. Clinician processed stressors with group members. Clinician discussed sleep hygiene with group members and sleep hygiene tips were explored.  joined for first part of group and shared Just for Today reading (EDU, 1991). “The Neurobiology of Addiction 101 in [employer]” [employer] video reviewed and discussed with group members. Clinician discussed internal and external triggers with group members.     Response: Patient attended class in person. Patient participated in completion of check in form.   Patient on check in form denied suicidal or homicidal thoughts or plan or intent to hurt self or others currently or since last group meeting. Patient participated in group discussions and viewing” The Neurobiology of Addiction 101 in [employer]” YouTube video”.     Personal Assessment 0-10 Scale (10 worst)    Anxiety:  3   Depression:  4   Cravings: 0     ASSESSMENT:     ..  Lab on 05/25/2022   Component Date Value Ref Range Status   • THC, Screen, Urine 05/25/2022 Negative  Negative Final   • Phencyclidine (PCP), Urine 05/25/2022 Negative  Negative Final   • Cocaine Screen, Urine 05/25/2022 Negative  Negative Final   • Methamphetamine, Ur 05/25/2022 Negative  Negative Final   • Opiate Screen 05/25/2022 Negative  Negative Final   • Amphetamine Screen, Urine 05/25/2022 Negative  Negative Final   •  Benzodiazepine Screen, Urine 05/25/2022 Negative  Negative Final   • Tricyclic Antidepressants Screen 05/25/2022 Negative  Negative Final   • Methadone Screen, Urine 05/25/2022 Positive (A) Negative Final   • Barbiturates Screen, Urine 05/25/2022 Negative  Negative Final   • Oxycodone Screen, Urine 05/25/2022 Negative  Negative Final   • Propoxyphene Screen 05/25/2022 Negative  Negative Final   • Buprenorphine, Screen, Urine 05/25/2022 Negative  Negative Final   • Ethanol, Urine 05/25/2022 Negative  Cutoff=0.020 % Final   Lab on 05/18/2022   Component Date Value Ref Range Status   • THC, Screen, Urine 05/18/2022 Negative  Negative Final   • Phencyclidine (PCP), Urine 05/18/2022 Negative  Negative Final   • Cocaine Screen, Urine 05/18/2022 Negative  Negative Final   • Methamphetamine, Ur 05/18/2022 Negative  Negative Final   • Opiate Screen 05/18/2022 Negative  Negative Final   • Amphetamine Screen, Urine 05/18/2022 Negative  Negative Final   • Benzodiazepine Screen, Urine 05/18/2022 Negative  Negative Final   • Tricyclic Antidepressants Screen 05/18/2022 Negative  Negative Final   • Methadone Screen, Urine 05/18/2022 Positive (A) Negative Final   • Barbiturates Screen, Urine 05/18/2022 Negative  Negative Final   • Oxycodone Screen, Urine 05/18/2022 Negative  Negative Final   • Propoxyphene Screen 05/18/2022 Negative  Negative Final   • Buprenorphine, Screen, Urine 05/18/2022 Negative  Negative Final   • Ethanol, Urine 05/18/2022 Negative  Cutoff=0.020 % Final   • Methadone 05/18/2022 +POSITIVE+   Final   • Methadone, Quantitative 05/18/2022 >9434  ng/mg creat Final   • EDDP 05/18/2022 >9434  ng/mg creat Final   • Level of Detection: 05/18/2022 Comment   Final    Testing Threshold:  50 ng/mL                                                                       '  This test was developed and its performance characteristics  determined by [facility].  It has not been cleared or approved  by the Food and Drug  Administration.   Lab on 05/10/2022   Component Date Value Ref Range Status   • THC, Screen, Urine 05/10/2022 Negative  Negative Final   • Phencyclidine (PCP), Urine 05/10/2022 Negative  Negative Final   • Cocaine Screen, Urine 05/10/2022 Negative  Negative Final   • Methamphetamine, Ur 05/10/2022 Negative  Negative Final   • Opiate Screen 05/10/2022 Negative  Negative Final   • Amphetamine Screen, Urine 05/10/2022 Negative  Negative Final   • Benzodiazepine Screen, Urine 05/10/2022 Negative  Negative Final   • Tricyclic Antidepressants Screen 05/10/2022 Negative  Negative Final   • Methadone Screen, Urine 05/10/2022 Positive (A) Negative Final   • Barbiturates Screen, Urine 05/10/2022 Negative  Negative Final   • Oxycodone Screen, Urine 05/10/2022 Negative  Negative Final   • Propoxyphene Screen 05/10/2022 Negative  Negative Final   • Buprenorphine, Screen, Urine 05/10/2022 Negative  Negative Final   • Ethanol, Urine 05/10/2022 Negative  Cutoff=0.020 % Final   • Methadone 05/10/2022 +POSITIVE+   Final   • Methadone, Quantitative 05/10/2022 4830  ng/mg creat Final   • EDDP 05/10/2022 >7246  ng/mg creat Final   • Level of Detection: 05/10/2022 Comment   Final    Testing Threshold:  50 ng/mL                                                                       '  This test was developed and its performance characteristics  determined by [facility].  It has not been cleared or approved  by the Food and Drug Administration.       Mental Status Exam  Hygiene:  good  Dress: casual  Attitude: cooperative and agreeable   Motor Activity: appropriate  Eye Contact:  good  Speech: regular rate and rhythm   Mood:  calm and cooperative  Affect:  Appropriate  Thought Processes:  Linear  Thought Content:  Normal  Suicidal Thoughts:  denies  Homicidal Thoughts:  denies  Crisis Safety Plan: yes, to come to the emergency room.  Hallucinations:  denies  Reliability: fair  Insight: fair  Judgement: fair  Impulse Control: fair    Family  issues related to recovery:  No change, see previous encounters    Recovery/spiritual support group attendance: No     Motivation for treatment: Patient during initial assessment stated her motivation for treatment is “get my baby back” and start better life.    Patient's Support Network Includes: Patient during initial assessment reported that her sober support system is her sponsor, Mari.    Progress toward goal: Not at goal    Prognosis: Fair with Ongoing Treatment     Self-reported number of days sober: Patient reported 60 on check in form.     ASAM Dimensions:  I.    Intoxication/Withdrawal:  0  (Patient during initial assessment denied active withdrawal symptoms).  II.   Medical Conditions/Complications:  0 (Patient during initial assessment denied medical conditions).  III.  Behavioral/Emotional/Cognitive: 1 (Due to patient report of depression and anxiety during initial assessment).  IV.  Readiness to Change: 1 (Patient is ordered for treatment by DCBS, but patient identified motivation for change during initial assessment).  V.   Relapse Risk: 2 (Due to patient report of recent substance use and report of two weeks longest being clean during initial assessment).  VI:  Recovery Environment: 1 (Patient reports she lives with a friend but reporting needs housing assistance and patient reports friend helping with transportation during initial assessment).  Total ASAM Score = 05      BASELINE SCORES 04/07/2022       KATHY-7   (16)                  PHQ-9   (20)       Updated Scores 5/19/2022  KATHY-7 (11)  PHQ-9 (7)       Patient agreeable to adhere to medication regimen as prescribed and report any side effects. Patient will contact this office, call 911 or present to the nearest emergency room should suicidal or homicidal ideations occur.    Impression/Formulation:    ICD-10-CM ICD-9-CM   1. Methamphetamine use disorder, severe, dependence (HCC)  F15.20 304.40   2. Opioid use disorder, severe, dependence (HCC)   F11.20 304.00       CLINICAL MANUVERING/INTERVENTIONS: Therapist utilized a person-centered approach to build rapport with group member.  Therapist implemented motivational interviewing techniques to assist client with exploring and resolving ambivalence associated with commitment to change behaviors related to substance use and addiction.  Therapist applied cognitive behavioral strategies to facilitate identification of maladaptive patterns of thinking and behavior that contribute to client’s risk for continued substance use and relapse.  Therapist employed group interaction activities to build rapport among group members, promote sobriety, and emphasize relapse prevention.  Therapist promoted safe nonjudgmental environment by providing group members with unconditional positive regard and encouraging group members to comply with group rules and guidelines. Therapist assisted group member with identifying and implementing healthier coping strategies.      PLAN:  Continue [facility] IOP Phase I   Aftercare:  [facility] Phase II  Program Assignments:  Personal recovery plan, relapse prevention plan, attendance of recovery support group meetings, exploration of sponsorship, drug/alcohol screens.     Please note that portions of this note were completed with a voice recognition program. Efforts were made to edit dictation, but occasionally words are mistranscribed.       This document signed by Amor Tabares, May 30, 2022, 13:32 EDT

## 2022-05-30 NOTE — PROGRESS NOTES
NAME: Ada Vogt  DATE: 05/19/2022     IOP GROUP   [id number]    Number of participants: 8  IOP GROUP NOTE     DATA:     3 hour IOP group therapy session (Check-ins, Coping Skills, Relapse Prevention)     Check Ins: Therapist continued facilitation of rapport building strategies between group members. Therapist asked that each patient check in with home life and recovery efforts and identify triggers, cravings, and high risk situations that arise between group sessions. Therapist provided empathy and support during group session.     Session Content/Coping Skills: Check in’s completed by group members. Introductions completed. Clinician discussed gratitudes with group members and the importance of recognizing gratitudes. [place] group activity completed by group members. Clinician processed activity with group members. Clinician explored experiences of working as a team during the activity. Clinician explored with group members passive, aggressive, and assertive communication.      Response: Patient attended class in person. Patient participated in completion of check in form. Patient reported utilizing a new support on check in form.   Patient on check in form denied suicidal or homicidal thoughts or plan or intent to hurt self or others currently or since last group meeting. Patient participated in introductions. Patient participated in group activity and group discussions.     Patient participated in treatment plan update. Patient participated in completion of KATHY-7 and PHQ-9 scales. Patient scored a 07 on PHQ-9 scale. Patient scored an 11 on KATHY-7 scale.     Personal Assessment 0-10 Scale (10 worst)    Anxiety:  6   Depression:  4   Cravings: 1     ASSESSMENT:     ..  Lab on 05/18/2022   Component Date Value Ref Range Status   • THC, Screen, Urine 05/18/2022 Negative  Negative Final   • Phencyclidine (PCP), Urine 05/18/2022 Negative  Negative Final   • Cocaine Screen, Urine 05/18/2022 Negative  Negative  Final   • Methamphetamine, Ur 05/18/2022 Negative  Negative Final   • Opiate Screen 05/18/2022 Negative  Negative Final   • Amphetamine Screen, Urine 05/18/2022 Negative  Negative Final   • Benzodiazepine Screen, Urine 05/18/2022 Negative  Negative Final   • Tricyclic Antidepressants Screen 05/18/2022 Negative  Negative Final   • Methadone Screen, Urine 05/18/2022 Positive (A) Negative Final   • Barbiturates Screen, Urine 05/18/2022 Negative  Negative Final   • Oxycodone Screen, Urine 05/18/2022 Negative  Negative Final   • Propoxyphene Screen 05/18/2022 Negative  Negative Final   • Buprenorphine, Screen, Urine 05/18/2022 Negative  Negative Final   • Ethanol, Urine 05/18/2022 Negative  Cutoff=0.020 % Final   • Methadone 05/18/2022 +POSITIVE+   Final   • Methadone, Quantitative 05/18/2022 >9434  ng/mg creat Final   • EDDP 05/18/2022 >9434  ng/mg creat Final   • Level of Detection: 05/18/2022 Comment   Final    Testing Threshold:  50 ng/mL                                                                       '  This test was developed and its performance characteristics  determined by [facility].  It has not been cleared or approved  by the Food and Drug Administration.   Lab on 05/10/2022   Component Date Value Ref Range Status   • THC, Screen, Urine 05/10/2022 Negative  Negative Final   • Phencyclidine (PCP), Urine 05/10/2022 Negative  Negative Final   • Cocaine Screen, Urine 05/10/2022 Negative  Negative Final   • Methamphetamine, Ur 05/10/2022 Negative  Negative Final   • Opiate Screen 05/10/2022 Negative  Negative Final   • Amphetamine Screen, Urine 05/10/2022 Negative  Negative Final   • Benzodiazepine Screen, Urine 05/10/2022 Negative  Negative Final   • Tricyclic Antidepressants Screen 05/10/2022 Negative  Negative Final   • Methadone Screen, Urine 05/10/2022 Positive (A) Negative Final   • Barbiturates Screen, Urine 05/10/2022 Negative  Negative Final   • Oxycodone Screen, Urine 05/10/2022 Negative  Negative  Final   • Propoxyphene Screen 05/10/2022 Negative  Negative Final   • Buprenorphine, Screen, Urine 05/10/2022 Negative  Negative Final   • Ethanol, Urine 05/10/2022 Negative  Cutoff=0.020 % Final   • Methadone 05/10/2022 +POSITIVE+   Final   • Methadone, Quantitative 05/10/2022 4830  ng/mg creat Final   • EDDP 05/10/2022 >7246  ng/mg creat Final   • Level of Detection: 05/10/2022 Comment   Final    Testing Threshold:  50 ng/mL                                                                       '  This test was developed and its performance characteristics  determined by [facility].  It has not been cleared or approved  by the Food and Drug Administration.   Lab on 05/04/2022   Component Date Value Ref Range Status   • Ethanol, Urine 05/04/2022 Negative  Cutoff=0.020 % Final   • THC, Screen, Urine 05/04/2022 Negative  Negative Final   • Phencyclidine (PCP), Urine 05/04/2022 Negative  Negative Final   • Cocaine Screen, Urine 05/04/2022 Negative  Negative Final   • Methamphetamine, Ur 05/04/2022 Negative  Negative Final   • Opiate Screen 05/04/2022 Negative  Negative Final   • Amphetamine Screen, Urine 05/04/2022 Negative  Negative Final   • Benzodiazepine Screen, Urine 05/04/2022 Negative  Negative Final   • Tricyclic Antidepressants Screen 05/04/2022 Negative  Negative Final   • Methadone Screen, Urine 05/04/2022 Positive (A) Negative Final   • Barbiturates Screen, Urine 05/04/2022 Negative  Negative Final   • Oxycodone Screen, Urine 05/04/2022 Negative  Negative Final   • Propoxyphene Screen 05/04/2022 Negative  Negative Final   • Buprenorphine, Screen, Urine 05/04/2022 Negative  Negative Final   • Methadone 05/04/2022 +POSITIVE+   Final   • Methadone, Quantitative 05/04/2022 2811  ng/mg creat Final   • EDDP 05/04/2022 >29681  ng/mg creat Final   • Level of Detection: 05/04/2022 Comment   Final    Testing Threshold:  50 ng/mL                                                                       '  This test was  developed and its performance characteristics  determined by [facility]  It has not been cleared or approved  by the Food and Drug Administration.       Mental Status Exam  Hygiene:  good  Dress: casual  Attitude: cooperative and agreeable   Motor Activity: appropriate  Eye Contact:  good  Speech: regular rate and rhythm   Mood:  calm and cooperative  Affect:  Appropriate  Thought Processes:  Linear  Thought Content:  Normal  Suicidal Thoughts:  denies  Homicidal Thoughts:  denies  Crisis Safety Plan: yes, to come to the emergency room.  Hallucinations:  denies  Reliability: fair  Insight: fair  Judgement: fair  Impulse Control: fair    Family issues related to recovery:  No change, see previous encounters    Recovery/spiritual support group attendance: No     Motivation for treatment: Patient during initial assessment stated her motivation for treatment is “get my baby back” and start better life.    Patient's Support Network Includes: Patient during initial assessment reported that her sober support system is her sponsor, Mari.    Progress toward goal: Not at goal    Prognosis: Fair with Ongoing Treatment     Self-reported number of days sober: Patient reported 54 on check in form.     ASAM Dimensions:  I.    Intoxication/Withdrawal:  0  (Patient during initial assessment denied active withdrawal symptoms).  II.   Medical Conditions/Complications:  0 (Patient during initial assessment denied medical conditions).  III.  Behavioral/Emotional/Cognitive: 1 (Due to patient report of depression and anxiety during initial assessment).  IV.  Readiness to Change: 1 (Patient is ordered for treatment by DCBS, but patient identified motivation for change during initial assessment).  V.   Relapse Risk: 2 (Due to patient report of recent substance use and report of two weeks longest being clean during initial assessment).  VI:  Recovery Environment: 1 (Patient reports she lives with a friend but reporting needs housing  assistance and patient reports friend helping with transportation during initial assessment).  Total ASAM Score = 05      BASELINE SCORES 04/07/2022       KATHY-7   (16)                  PHQ-9   (20)       Updated Scores 5/19/2022  KATHY-7 (11)  PHQ-9 (7)    Patient agreeable to adhere to medication regimen as prescribed and report any side effects. Patient will contact this office, call 911 or present to the nearest emergency room should suicidal or homicidal ideations occur.    Impression/Formulation:    ICD-10-CM ICD-9-CM   1. Methamphetamine use disorder, severe, dependence (HCC)  F15.20 304.40   2. Opioid use disorder, severe, dependence (HCC)  F11.20 304.00       CLINICAL MANUVERING/INTERVENTIONS: Therapist utilized a person-centered approach to build rapport with group member.  Therapist implemented motivational interviewing techniques to assist client with exploring and resolving ambivalence associated with commitment to change behaviors related to substance use and addiction.  Therapist applied cognitive behavioral strategies to facilitate identification of maladaptive patterns of thinking and behavior that contribute to client’s risk for continued substance use and relapse.  Therapist employed group interaction activities to build rapport among group members, promote sobriety, and emphasize relapse prevention.  Therapist promoted safe nonjudgmental environment by providing group members with unconditional positive regard and encouraging group members to comply with group rules and guidelines. Therapist assisted group member with identifying and implementing healthier coping strategies.      PLAN:  Continue [facility] IOP Phase I   Aftercare:  [facility] Phase II  Program Assignments:  Personal recovery plan, relapse prevention plan, attendance of recovery support group meetings, exploration of sponsorship, drug/alcohol screens.     Please note that  portions of this note were completed with a voice recognition program. Efforts were made to edit dictation, but occasionally words are mistranscribed.       This document signed by Amor Tabares, May 30, 2022, 09:34 EDT

## 2022-05-30 NOTE — PROGRESS NOTES
NAME: Ada Vogt  DATE: 05/23/2022    [facility] GROUP   [id number]    Number of participants: 7  IOP GROUP NOTE     DATA:     3 hour IOP group therapy session (Check-ins, Coping Skills, Relapse Prevention)     Check Ins: Therapist continued facilitation of rapport building strategies between group members. Therapist asked that each patient check in with home life and recovery efforts and identify triggers, cravings, and high risk situations that arise between group sessions. Therapist provided empathy and support during group session.     Session Content/Coping Skills: Check in forms completed by group members.  joined for first part of meeting and shared Just for Today reading (EDU, 1991). Clinician discussed with group members making amends and the 8th and 9th step of the 12 steps. CD-IOP introductory PowerPoint reviewed with group members. Safety plan of Report to police or hospital, or call 911 if feeling unsafe, if having suicidal or homicidal thoughts, or if in emergency need of medications verbally reviewed with group members and suicide prevention hotline number verbally provided to group members. [place] “facts about addiction” psychoeducational material reviewed. Clinician explored with group members their opinion on if addiction is a disease or a choice. Clinician discussed with group members the Biopsychosocial approach to addiction. Clinician began discussion of addiction and the brain.      Response: Patient attended class in person. Patient participated in completion of check in form.   Patient on check in form denied suicidal or homicidal thoughts or plan or intent to hurt self or others currently or since last group meeting.   Patient participated in review of psychoeducational material and group discussions.     Personal Assessment 0-10 Scale (10 worst)    Anxiety:  3   Depression:  2   Cravings: 2     ASSESSMENT:     ..  Lab on 05/18/2022   Component Date Value Ref Range  Status   • THC, Screen, Urine 05/18/2022 Negative  Negative Final   • Phencyclidine (PCP), Urine 05/18/2022 Negative  Negative Final   • Cocaine Screen, Urine 05/18/2022 Negative  Negative Final   • Methamphetamine, Ur 05/18/2022 Negative  Negative Final   • Opiate Screen 05/18/2022 Negative  Negative Final   • Amphetamine Screen, Urine 05/18/2022 Negative  Negative Final   • Benzodiazepine Screen, Urine 05/18/2022 Negative  Negative Final   • Tricyclic Antidepressants Screen 05/18/2022 Negative  Negative Final   • Methadone Screen, Urine 05/18/2022 Positive (A) Negative Final   • Barbiturates Screen, Urine 05/18/2022 Negative  Negative Final   • Oxycodone Screen, Urine 05/18/2022 Negative  Negative Final   • Propoxyphene Screen 05/18/2022 Negative  Negative Final   • Buprenorphine, Screen, Urine 05/18/2022 Negative  Negative Final   • Ethanol, Urine 05/18/2022 Negative  Cutoff=0.020 % Final   • Methadone 05/18/2022 +POSITIVE+   Final   • Methadone, Quantitative 05/18/2022 >9434  ng/mg creat Final   • EDDP 05/18/2022 >9434  ng/mg creat Final   • Level of Detection: 05/18/2022 Comment   Final    Testing Threshold:  50 ng/mL                                                                       '  This test was developed and its performance characteristics  determined by [facility].  It has not been cleared or approved  by the Food and Drug Administration.   Lab on 05/10/2022   Component Date Value Ref Range Status   • THC, Screen, Urine 05/10/2022 Negative  Negative Final   • Phencyclidine (PCP), Urine 05/10/2022 Negative  Negative Final   • Cocaine Screen, Urine 05/10/2022 Negative  Negative Final   • Methamphetamine, Ur 05/10/2022 Negative  Negative Final   • Opiate Screen 05/10/2022 Negative  Negative Final   • Amphetamine Screen, Urine 05/10/2022 Negative  Negative Final   • Benzodiazepine Screen, Urine 05/10/2022 Negative  Negative Final   • Tricyclic Antidepressants Screen 05/10/2022 Negative  Negative Final   •  Methadone Screen, Urine 05/10/2022 Positive (A) Negative Final   • Barbiturates Screen, Urine 05/10/2022 Negative  Negative Final   • Oxycodone Screen, Urine 05/10/2022 Negative  Negative Final   • Propoxyphene Screen 05/10/2022 Negative  Negative Final   • Buprenorphine, Screen, Urine 05/10/2022 Negative  Negative Final   • Ethanol, Urine 05/10/2022 Negative  Cutoff=0.020 % Final   • Methadone 05/10/2022 +POSITIVE+   Final   • Methadone, Quantitative 05/10/2022 4830  ng/mg creat Final   • EDDP 05/10/2022 >7246  ng/mg creat Final   • Level of Detection: 05/10/2022 Comment   Final    Testing Threshold:  50 ng/mL                                                                       '  This test was developed and its performance characteristics  determined by [facility].  It has not been cleared or approved  by the Food and Drug Administration.   Lab on 05/04/2022   Component Date Value Ref Range Status   • Ethanol, Urine 05/04/2022 Negative  Cutoff=0.020 % Final   • THC, Screen, Urine 05/04/2022 Negative  Negative Final   • Phencyclidine (PCP), Urine 05/04/2022 Negative  Negative Final   • Cocaine Screen, Urine 05/04/2022 Negative  Negative Final   • Methamphetamine, Ur 05/04/2022 Negative  Negative Final   • Opiate Screen 05/04/2022 Negative  Negative Final   • Amphetamine Screen, Urine 05/04/2022 Negative  Negative Final   • Benzodiazepine Screen, Urine 05/04/2022 Negative  Negative Final   • Tricyclic Antidepressants Screen 05/04/2022 Negative  Negative Final   • Methadone Screen, Urine 05/04/2022 Positive (A) Negative Final   • Barbiturates Screen, Urine 05/04/2022 Negative  Negative Final   • Oxycodone Screen, Urine 05/04/2022 Negative  Negative Final   • Propoxyphene Screen 05/04/2022 Negative  Negative Final   • Buprenorphine, Screen, Urine 05/04/2022 Negative  Negative Final   • Methadone 05/04/2022 +POSITIVE+   Final   • Methadone, Quantitative 05/04/2022 2811  ng/mg creat Final   • EDDP 05/04/2022 >61118  ng/mg  creat Final   • Level of Detection: 05/04/2022 Comment   Final    Testing Threshold:  50 ng/mL                                                                       '  This test was developed and its performance characteristics  determined by [employer].  It has not been cleared or approved  by the Food and Drug Administration.       Mental Status Exam  Hygiene:  good  Dress: casual  Attitude: cooperative and agreeable   Motor Activity: appropriate  Eye Contact:  good  Speech: regular rate and rhythm   Mood:  calm and cooperative  Affect:  Appropriate  Thought Processes:  Linear  Thought Content:  Normal  Suicidal Thoughts:  denies  Homicidal Thoughts:  denies  Crisis Safety Plan: yes, to come to the emergency room.  Hallucinations:  denies  Reliability: fair  Insight: fair  Judgement: fair  Impulse Control: fair    Family issues related to recovery:  No change, see previous encounters    Recovery/spiritual support group attendance: Yes, patient reported two, [employer].      Sponsor: Yes     Motivation for treatment: Patient during initial assessment stated her motivation for treatment is “get my baby back” and start better life.    Patient's Support Network Includes: Patient during initial assessment reported that her sober support system is her sponsor, Mari.    Progress toward goal: Not at goal    Prognosis: Fair with Ongoing Treatment     Self-reported number of days sober: Patient reported 58 on check in form.     ASAM Dimensions:  I.    Intoxication/Withdrawal:  0  (Patient during initial assessment denied active withdrawal symptoms).  II.   Medical Conditions/Complications:  0 (Patient during initial assessment denied medical conditions).  III.  Behavioral/Emotional/Cognitive: 1 (Due to patient report of depression and anxiety during initial assessment).  IV.  Readiness to Change: 1 (Patient is ordered for treatment by DCBS, but patient identified motivation for change during initial assessment).  V.    Relapse Risk: 2 (Due to patient report of recent substance use and report of two weeks longest being clean during initial assessment).  VI:  Recovery Environment: 1 (Patient reports she lives with a friend but reporting needs housing assistance and patient reports friend helping with transportation during initial assessment).  Total ASAM Score = 05      BASELINE SCORES 04/07/2022       KATHY-7   (16)                  PHQ-9   (20)       Updated Scores 5/19/2022  KATHY-7 (11)  PHQ-9 (7)      Patient agreeable to adhere to medication regimen as prescribed and report any side effects. Patient will contact this office, call 911 or present to the nearest emergency room should suicidal or homicidal ideations occur.    Impression/Formulation:    ICD-10-CM ICD-9-CM   1. Methamphetamine use disorder, severe, dependence (HCC)  F15.20 304.40   2. Opioid use disorder, severe, dependence (HCC)  F11.20 304.00       CLINICAL MANUVERING/INTERVENTIONS: Therapist utilized a person-centered approach to build rapport with group member.  Therapist implemented motivational interviewing techniques to assist client with exploring and resolving ambivalence associated with commitment to change behaviors related to substance use and addiction.  Therapist applied cognitive behavioral strategies to facilitate identification of maladaptive patterns of thinking and behavior that contribute to client’s risk for continued substance use and relapse.  Therapist employed group interaction activities to build rapport among group members, promote sobriety, and emphasize relapse prevention.  Therapist promoted safe nonjudgmental environment by providing group members with unconditional positive regard and encouraging group members to comply with group rules and guidelines. Therapist assisted group member with identifying and implementing healthier coping strategies.      PLAN:  Continue [facility] IOP Phase I   Aftercare:  [facility]  Program Assignments:  Personal recovery plan, relapse prevention plan, attendance of recovery support group meetings, exploration of sponsorship, drug/alcohol screens.     Please note that portions of this note were completed with a voice recognition program. Efforts were made to edit dictation, but occasionally words are mistranscribed.       This document signed by Amor Tabares, May 30, 2022, 11:31 EDT

## 2022-05-30 NOTE — PROGRESS NOTES
NAME: Ada Vogt  DATE: 05/26/2022    [facility] GROUP  [id number]    Number of participants: 7  IOP GROUP NOTE     DATA:     3 hour IOP group therapy session (Check-ins, Coping Skills, Relapse Prevention)     Check Ins: Therapist continued facilitation of rapport building strategies between group members. Therapist asked that each patient check in with home life and recovery efforts and identify triggers, cravings, and high risk situations that arise between group sessions. Therapist provided empathy and support during group session.     Session Content/Coping Skills: Check in forms completed by group members. Clinician discussed with group members stigmas related to addiction and barriers these create. Clinician discussed assertive communication and example discussed as a group. Clinician discussed with group members setting boundaries. Clinician reviewed coping skills toolbox, “50 ways to take a break” and “99 coping skills” psychoeducational material with group members (retrieved from [employer]). Therapist Aid “coping skills addiction” psychoeducational material reviewed with group members.     Response: Patient attended class in person. Patient participated in completion of check in form.   Patient on check in form denied suicidal or homicidal thoughts or plan or intent to hurt self or others currently or since last group meeting. Patient participated in group discussions and review of psychoeducational material.     Personal Assessment 0-10 Scale (10 worst)    Anxiety:  2   Depression:  2   Cravings: 0     ASSESSMENT:     ..  Lab on 05/25/2022   Component Date Value Ref Range Status   • THC, Screen, Urine 05/25/2022 Negative  Negative Final   • Phencyclidine (PCP), Urine 05/25/2022 Negative  Negative Final   • Cocaine Screen, Urine 05/25/2022 Negative  Negative Final   • Methamphetamine, Ur 05/25/2022 Negative  Negative Final   • Opiate Screen 05/25/2022 Negative  Negative Final   • Amphetamine  Screen, Urine 05/25/2022 Negative  Negative Final   • Benzodiazepine Screen, Urine 05/25/2022 Negative  Negative Final   • Tricyclic Antidepressants Screen 05/25/2022 Negative  Negative Final   • Methadone Screen, Urine 05/25/2022 Positive (A) Negative Final   • Barbiturates Screen, Urine 05/25/2022 Negative  Negative Final   • Oxycodone Screen, Urine 05/25/2022 Negative  Negative Final   • Propoxyphene Screen 05/25/2022 Negative  Negative Final   • Buprenorphine, Screen, Urine 05/25/2022 Negative  Negative Final   • Ethanol, Urine 05/25/2022 Negative  Cutoff=0.020 % Final   Lab on 05/18/2022   Component Date Value Ref Range Status   • THC, Screen, Urine 05/18/2022 Negative  Negative Final   • Phencyclidine (PCP), Urine 05/18/2022 Negative  Negative Final   • Cocaine Screen, Urine 05/18/2022 Negative  Negative Final   • Methamphetamine, Ur 05/18/2022 Negative  Negative Final   • Opiate Screen 05/18/2022 Negative  Negative Final   • Amphetamine Screen, Urine 05/18/2022 Negative  Negative Final   • Benzodiazepine Screen, Urine 05/18/2022 Negative  Negative Final   • Tricyclic Antidepressants Screen 05/18/2022 Negative  Negative Final   • Methadone Screen, Urine 05/18/2022 Positive (A) Negative Final   • Barbiturates Screen, Urine 05/18/2022 Negative  Negative Final   • Oxycodone Screen, Urine 05/18/2022 Negative  Negative Final   • Propoxyphene Screen 05/18/2022 Negative  Negative Final   • Buprenorphine, Screen, Urine 05/18/2022 Negative  Negative Final   • Ethanol, Urine 05/18/2022 Negative  Cutoff=0.020 % Final   • Methadone 05/18/2022 +POSITIVE+   Final   • Methadone, Quantitative 05/18/2022 >9434  ng/mg creat Final   • EDDP 05/18/2022 >9434  ng/mg creat Final   • Level of Detection: 05/18/2022 Comment   Final    Testing Threshold:  50 ng/mL                                                                       '  This test was developed and its performance characteristics  determined by [facility].  It has not been  cleared or approved  by the Food and Drug Administration.   Lab on 05/10/2022   Component Date Value Ref Range Status   • THC, Screen, Urine 05/10/2022 Negative  Negative Final   • Phencyclidine (PCP), Urine 05/10/2022 Negative  Negative Final   • Cocaine Screen, Urine 05/10/2022 Negative  Negative Final   • Methamphetamine, Ur 05/10/2022 Negative  Negative Final   • Opiate Screen 05/10/2022 Negative  Negative Final   • Amphetamine Screen, Urine 05/10/2022 Negative  Negative Final   • Benzodiazepine Screen, Urine 05/10/2022 Negative  Negative Final   • Tricyclic Antidepressants Screen 05/10/2022 Negative  Negative Final   • Methadone Screen, Urine 05/10/2022 Positive (A) Negative Final   • Barbiturates Screen, Urine 05/10/2022 Negative  Negative Final   • Oxycodone Screen, Urine 05/10/2022 Negative  Negative Final   • Propoxyphene Screen 05/10/2022 Negative  Negative Final   • Buprenorphine, Screen, Urine 05/10/2022 Negative  Negative Final   • Ethanol, Urine 05/10/2022 Negative  Cutoff=0.020 % Final   • Methadone 05/10/2022 +POSITIVE+   Final   • Methadone, Quantitative 05/10/2022 4830  ng/mg creat Final   • EDDP 05/10/2022 >7246  ng/mg creat Final   • Level of Detection: 05/10/2022 Comment   Final    Testing Threshold:  50 ng/mL                                                                       '  This test was developed and its performance characteristics  determined by [facility].  It has not been cleared or approved  by the Food and Drug Administration.       Mental Status Exam  Hygiene:  good  Dress: casual  Attitude: cooperative and agreeable   Motor Activity: appropriate  Eye Contact:  good  Speech: regular rate and rhythm   Mood:  calm and cooperative  Affect:  Appropriate  Thought Processes:  Linear  Thought Content:  Normal  Suicidal Thoughts:  denies  Homicidal Thoughts:  denies  Crisis Safety Plan: yes, to come to the emergency room.  Hallucinations:  denies  Reliability: fair  Insight: fair  Judgement:  fair  Impulse Control: fair    Family issues related to recovery:  No change, see previous encounters    Recovery/spiritual support group attendance: No      Sponsor: No     Motivation for treatment: Patient during initial assessment stated her motivation for treatment is “get my baby back” and start better life.    Patient's Support Network Includes: Patient during initial assessment reported that her sober support system is her sponsor, Mari.    Progress toward goal: Not at goal    Prognosis: Fair with Ongoing Treatment     Self-reported number of days sober: Patient reported 61 on check in form.     ASAM Dimensions:  I.    Intoxication/Withdrawal:  0  (Patient during initial assessment denied active withdrawal symptoms).  II.   Medical Conditions/Complications:  0 (Patient during initial assessment denied medical conditions).  III.  Behavioral/Emotional/Cognitive: 1 (Due to patient report of depression and anxiety during initial assessment).  IV.  Readiness to Change: 1 (Patient is ordered for treatment by DCBS, but patient identified motivation for change during initial assessment).  V.   Relapse Risk: 2 (Due to patient report of recent substance use and report of two weeks longest being clean during initial assessment).  VI:  Recovery Environment: 1 (Patient reports she lives with a friend but reporting needs housing assistance and patient reports friend helping with transportation during initial assessment).  Total ASAM Score = 05      BASELINE SCORES 04/07/2022       KATHY-7   (16)                  PHQ-9   (20)       Updated Scores 5/19/2022  KATHY-7 (11)  PHQ-9 (7)      Patient agreeable to adhere to medication regimen as prescribed and report any side effects. Patient will contact this office, call 911 or present to the nearest emergency room should suicidal or homicidal ideations occur.    Impression/Formulation:    ICD-10-CM ICD-9-CM   1. Methamphetamine use disorder, severe, dependence (HCC)  F15.20  304.40   2. Opioid use disorder, severe, dependence (HCC)  F11.20 304.00       CLINICAL MANUVERING/INTERVENTIONS: Therapist utilized a person-centered approach to build rapport with group member.  Therapist implemented motivational interviewing techniques to assist client with exploring and resolving ambivalence associated with commitment to change behaviors related to substance use and addiction.  Therapist applied cognitive behavioral strategies to facilitate identification of maladaptive patterns of thinking and behavior that contribute to client’s risk for continued substance use and relapse.  Therapist employed group interaction activities to build rapport among group members, promote sobriety, and emphasize relapse prevention.  Therapist promoted safe nonjudgmental environment by providing group members with unconditional positive regard and encouraging group members to comply with group rules and guidelines. Therapist assisted group member with identifying and implementing healthier coping strategies.      PLAN:  Continue [facility] IOP Phase I   Aftercare:  [facility] Phase II  Program Assignments:  Personal recovery plan, relapse prevention plan, attendance of recovery support group meetings, exploration of sponsorship, drug/alcohol screens.     Please note that portions of this note were completed with a voice recognition program. Efforts were made to edit dictation, but occasionally words are mistranscribed.       This document signed by Amor Tabares, May 30, 2022, 14:23 EDT

## 2022-05-30 NOTE — PROGRESS NOTES
[facility]  [address], [address]  [place] [address]      Intensive Outpatient Program for Chemical Dependence (CD IOP)  Verbally Completed on 5/19/2022    Treatment Plan Reassessment (biweekly)       Long Term Goal:  Ada Vogt will establish a sustained recovery and will maintain total abstinence from mind-altering substances other than what is prescribed and/or approved by the attending physician. Pt will learn, practice, and utilize behavioral and cognitive coping skills to help maintain sobriety.    Patient Care Needs:  Ada Vogt presents with Methamphetamine use disorder, severe, dependence and Opioid use disorder, severe, dependence and is at high risk for relapse without continued treatment.  Pt has a history of using drugs/alcohol until intoxicated or passed out and has been unable to stop or cut down use of alcohol/drugs once starting, despite verbalized desire to do so, and the negative consequences from continued use.  Pt's use has negatively impacted social, occupational, and/or physical functioning.     1.  Patient will participate in a medical evaluation to assess the effects of chemical dependence and will cooperate with an evaluation by the attending psychiatrist or psychiatric nurse practitioner for psychotropic medication if appropriate.      2.  Patient will adhere to the IOP group rules.        3.  Patient will attend group sessions as scheduled. Patient will notify therapist and support staff of any absences or tardies. Patient will provide a valid and verifiable excuse for absences to be considered excused.    4.  Patient will participate in random drug and alcohol screenings and will exhibit negative results on said screenings.    5.  Patient will identify high risk situations, people, and places to avoid or manage in order to maintain sobriety.    6.  Patient will participate in group discussions by giving and receiving feedback  appropriately.      7.  Patient will develop a positive network of support by attending a minimum of two recovery/spiritual support groups each week (two outside of IOP group) and by exploring, obtaining or maintaining a sponsor or sober support person.      8.  Patient will identify, practice, and implement at least 5 healthy coping strategies to manage difficult emotions while remaining abstinent.    9.   Patient will develop relapse prevention skills and be able to identify and share them with the group in the form of a relapse prevention plan.    10.  Patient will write a personal recovery plan and share it with the group prior to discharge.    11.  Patient will offer family participation in family education groups, if appropriate.    12.  Patient will exhibit increased motivation to change as assessed by his/her cooperation and behavior and the ASAM assessment tool.    13.  Patient will utilize healthy coping skills to manage depressive and anxious symptoms without using alcohol or other mind-altering substances and will exhibit decreased score on the PHQ-9 and KATHY-7.   1. Patient participated in an evaluation for medication management with YULI Lea. Patient is meeting with medical provider as scheduled for medication management and follow-up.          2.  Patient read and signed IOP Group Rules. Patient is adhering to group rules.      3.  Patient has 0 unexcused absences and 1 excused absence.                  4.  Patient has tested positive for methadone on UDS. All ethanol screens have been negative. Patient has brought in verification from [facility] that patient receives daily dose of Methadone 20 mg.          5.  Patient has encountered the following high-risk situations since beginning treatment: Patient reported a cookout that a family member was at.     6.  Patient provides and receives feedback daily. When not actively participating, patient is attentive and  appropriate.    7.  Patient is attending recovery/spiritual support group meetings at least once times per week. Patient is not working the 12 steps. Patient reports she has attended AA and CR meetings.     8.  Patient has identified and implemented the following coping strategies during high risk situations: Patient reports journaling, called peer support, doing things by herself, and eating gummy bears.         9.  Patient has identified high-risk people, places, and things as well as healthy coping strategies for avoiding relapse.       10.  Patient has not yet developed a personal recovery plan.       11.  No family participation.          12.  Patient reported cravings were a 1 on a 0-10 scale (0-none) on check in form completed 5/19/2022.     13. Patient scored a 07 on PHQ-9 scale and an 11 on KATHY-7 scale completed on 5/19/2022.       1.  Partially completed.  Patient participated initial evaluation for medication management with YULI Lea.  Patient will continue participating in as scheduled medication management and follow-up appointments with psychiatric medical provider.    2.  Patient is making progress toward goal and will continue working toward objective.     3.  Patient will continue working toward objective.                   4.  Patient will continue working toward objective.          5.  Partially completed. Patient will continue working toward objective.      6.  Partially completed. Patient will continue working toward objective.        7.  Patient is making progress toward goal.                   8.  Partially completed. Patient will continue working toward objective.            9.  Partially completed. Patient will continue working toward objective.           10.  Partially completed. Patient will continue working toward objective.       11.  Patient will continue to encourage family participation.      12.  Partially completed.  Patient will continue implementing behavioral changes  to promote long-term sobriety and recovery.    13. Partially completed.  Anxiety and depression will continue to be monitored.       Team Members:  Patient - Verbally Agreed on 5/19/2022  Medical Provider - YULI Lea  [facility] Licensed Therapist - DARREN Panda, W  [facility] Director - Dimple Null, [facility]  Support Staff-

## 2022-06-01 ENCOUNTER — OFFICE VISIT (OUTPATIENT)
Dept: PSYCHIATRY | Facility: HOSPITAL | Age: 37
End: 2022-06-01

## 2022-06-01 ENCOUNTER — LAB (OUTPATIENT)
Dept: LAB | Facility: HOSPITAL | Age: 37
End: 2022-06-01

## 2022-06-01 DIAGNOSIS — F15.20 METHAMPHETAMINE USE DISORDER, SEVERE, DEPENDENCE: Primary | ICD-10-CM

## 2022-06-01 DIAGNOSIS — F11.20 OPIOID USE DISORDER, SEVERE, DEPENDENCE: ICD-10-CM

## 2022-06-01 DIAGNOSIS — F15.20 METHAMPHETAMINE USE DISORDER, SEVERE, DEPENDENCE: ICD-10-CM

## 2022-06-01 PROCEDURE — G0480 DRUG TEST DEF 1-7 CLASSES: HCPCS

## 2022-06-01 PROCEDURE — H0015 ALCOHOL AND/OR DRUG SERVICES: HCPCS | Performed by: NURSE PRACTITIONER

## 2022-06-01 PROCEDURE — 80307 DRUG TEST PRSMV CHEM ANLYZR: CPT

## 2022-06-01 NOTE — PROGRESS NOTES
NAME: Ada Vogt  DATE: 05/30/2022    [facility] GROUP   [id number]    Number of participants: 5  IOP GROUP NOTE     DATA:     3 hour IOP group therapy session (Check-ins, Coping Skills, Relapse Prevention)     Check Ins: Therapist continued facilitation of rapport building strategies between group members. Therapist asked that each patient check in with home life and recovery efforts and identify triggers, cravings, and high risk situations that arise between group sessions. Therapist provided empathy and support during group session.     Session Content/Coping Skills: Check in’s completed by group members. Clinician explored with group members coping skills that they had utilized since last group meeting. Clinician provided review of discussion of addiction and the brain, pre-frontal cortex, and the mid-brain. “The Cognitive Model” therapist aid psychoeducational material reviewed with group members. Therapist aid “Cognitive Distortions” psychoeducational material reviewed with group members. Group members identified different cognitive distortions they identified with. Therapist Aid “Thought Record” reviewed with group members. [employer] video “Addictive Behavior and Self-Deception” by [employer] viewed. Group members identified which types of addictive thinking they can identify with.     Response: Patient attended class in person. Patient participated in completion of check in form.   Patient on check in form denied suicidal or homicidal thoughts or plan or intent to hurt self or others currently or since last group meeting. Patient participated in review of psychoeducational material and viewing and discussing [employer] video “Addictive Behavior and Self-Deception”.    Personal Assessment 0-10 Scale (10 worst)    Anxiety:  4   Depression:  4   Cravings: 2     ASSESSMENT:     ..  Lab on 05/25/2022   Component Date Value Ref Range Status   • THC, Screen, Urine 05/25/2022 Negative  Negative Final   •  Phencyclidine (PCP), Urine 05/25/2022 Negative  Negative Final   • Cocaine Screen, Urine 05/25/2022 Negative  Negative Final   • Methamphetamine, Ur 05/25/2022 Negative  Negative Final   • Opiate Screen 05/25/2022 Negative  Negative Final   • Amphetamine Screen, Urine 05/25/2022 Negative  Negative Final   • Benzodiazepine Screen, Urine 05/25/2022 Negative  Negative Final   • Tricyclic Antidepressants Screen 05/25/2022 Negative  Negative Final   • Methadone Screen, Urine 05/25/2022 Positive (A) Negative Final   • Barbiturates Screen, Urine 05/25/2022 Negative  Negative Final   • Oxycodone Screen, Urine 05/25/2022 Negative  Negative Final   • Propoxyphene Screen 05/25/2022 Negative  Negative Final   • Buprenorphine, Screen, Urine 05/25/2022 Negative  Negative Final   • Ethanol, Urine 05/25/2022 Negative  Cutoff=0.020 % Final   Lab on 05/18/2022   Component Date Value Ref Range Status   • THC, Screen, Urine 05/18/2022 Negative  Negative Final   • Phencyclidine (PCP), Urine 05/18/2022 Negative  Negative Final   • Cocaine Screen, Urine 05/18/2022 Negative  Negative Final   • Methamphetamine, Ur 05/18/2022 Negative  Negative Final   • Opiate Screen 05/18/2022 Negative  Negative Final   • Amphetamine Screen, Urine 05/18/2022 Negative  Negative Final   • Benzodiazepine Screen, Urine 05/18/2022 Negative  Negative Final   • Tricyclic Antidepressants Screen 05/18/2022 Negative  Negative Final   • Methadone Screen, Urine 05/18/2022 Positive (A) Negative Final   • Barbiturates Screen, Urine 05/18/2022 Negative  Negative Final   • Oxycodone Screen, Urine 05/18/2022 Negative  Negative Final   • Propoxyphene Screen 05/18/2022 Negative  Negative Final   • Buprenorphine, Screen, Urine 05/18/2022 Negative  Negative Final   • Ethanol, Urine 05/18/2022 Negative  Cutoff=0.020 % Final   • Methadone 05/18/2022 +POSITIVE+   Final   • Methadone, Quantitative 05/18/2022 >9434  ng/mg creat Final   • EDDP 05/18/2022 >9434  ng/mg creat Final   •  Level of Detection: 05/18/2022 Comment   Final    Testing Threshold:  50 ng/mL                                                                       '  This test was developed and its performance characteristics  determined by [employer].  It has not been cleared or approved  by the Food and Drug Administration.   Lab on 05/10/2022   Component Date Value Ref Range Status   • THC, Screen, Urine 05/10/2022 Negative  Negative Final   • Phencyclidine (PCP), Urine 05/10/2022 Negative  Negative Final   • Cocaine Screen, Urine 05/10/2022 Negative  Negative Final   • Methamphetamine, Ur 05/10/2022 Negative  Negative Final   • Opiate Screen 05/10/2022 Negative  Negative Final   • Amphetamine Screen, Urine 05/10/2022 Negative  Negative Final   • Benzodiazepine Screen, Urine 05/10/2022 Negative  Negative Final   • Tricyclic Antidepressants Screen 05/10/2022 Negative  Negative Final   • Methadone Screen, Urine 05/10/2022 Positive (A) Negative Final   • Barbiturates Screen, Urine 05/10/2022 Negative  Negative Final   • Oxycodone Screen, Urine 05/10/2022 Negative  Negative Final   • Propoxyphene Screen 05/10/2022 Negative  Negative Final   • Buprenorphine, Screen, Urine 05/10/2022 Negative  Negative Final   • Ethanol, Urine 05/10/2022 Negative  Cutoff=0.020 % Final   • Methadone 05/10/2022 +POSITIVE+   Final   • Methadone, Quantitative 05/10/2022 4830  ng/mg creat Final   • EDDP 05/10/2022 >7246  ng/mg creat Final   • Level of Detection: 05/10/2022 Comment   Final    Testing Threshold:  50 ng/mL                                                                       '  This test was developed and its performance characteristics  determined by [facility].  It has not been cleared or approved  by the Food and Drug Administration.       Mental Status Exam  Hygiene:  good  Dress: casual  Attitude: cooperative and agreeable   Motor Activity: appropriate  Eye Contact:  good  Speech: regular rate and rhythm   Mood:  calm and  cooperative  Affect:  Appropriate  Thought Processes:  Linear  Thought Content:  Normal  Suicidal Thoughts:  denies  Homicidal Thoughts:  denies  Crisis Safety Plan: yes, to come to the emergency room.  Hallucinations:  denies  Reliability: fair  Insight: fair  Judgement: fair  Impulse Control: fair    Family issues related to recovery:  No change, see previous encounters    Recovery/spiritual support group attendance: Patient reported attending two in the rooms gallito meetings.      Motivation for treatment: Patient during initial assessment stated her motivation for treatment is “get my baby back” and start better life.    Patient's Support Network Includes: Patient during initial assessment reported that her sober support system is her sponsor, Mari.    Progress toward goal: Not at goal    Prognosis: Fair with Ongoing Treatment     Self-reported number of days sober: Patient reported 65 on check in form.     ASAM Dimensions:  I.    Intoxication/Withdrawal:  0  (Patient during initial assessment denied active withdrawal symptoms).  II.   Medical Conditions/Complications:  0 (Patient during initial assessment denied medical conditions).  III.  Behavioral/Emotional/Cognitive: 1 (Due to patient report of depression and anxiety during initial assessment).  IV.  Readiness to Change: 1 (Patient is ordered for treatment by DCBS, but patient identified motivation for change during initial assessment).  V.   Relapse Risk: 2 (Due to patient report of recent substance use and report of two weeks longest being clean during initial assessment).  VI:  Recovery Environment: 1 (Patient reports she lives with a friend but reporting needs housing assistance and patient reports friend helping with transportation during initial assessment).  Total ASAM Score = 05      BASELINE SCORES 04/07/2022       KATHY-7   (16)                  PHQ-9   (20)       Updated Scores 5/19/2022  KATHY-7 (11)  PHQ-9 (7)      Patient agreeable to adhere to  medication regimen as prescribed and report any side effects. Patient will contact this office, call 911 or present to the nearest emergency room should suicidal or homicidal ideations occur.    Impression/Formulation:    ICD-10-CM ICD-9-CM   1. Methamphetamine use disorder, severe, dependence (HCC)  F15.20 304.40   2. Opioid use disorder, severe, dependence (HCC)  F11.20 304.00       CLINICAL MANUVERING/INTERVENTIONS: Therapist utilized a person-centered approach to build rapport with group member.  Therapist implemented motivational interviewing techniques to assist client with exploring and resolving ambivalence associated with commitment to change behaviors related to substance use and addiction.  Therapist applied cognitive behavioral strategies to facilitate identification of maladaptive patterns of thinking and behavior that contribute to client’s risk for continued substance use and relapse.  Therapist employed group interaction activities to build rapport among group members, promote sobriety, and emphasize relapse prevention.  Therapist promoted safe nonjudgmental environment by providing group members with unconditional positive regard and encouraging group members to comply with group rules and guidelines. Therapist assisted group member with identifying and implementing healthier coping strategies.      PLAN:  Continue [facility] IOP Phase I   Aftercare:  [facility] Phase II  Program Assignments:  Personal recovery plan, relapse prevention plan, attendance of recovery support group meetings, exploration of sponsorship, drug/alcohol screens.     Please note that portions of this note were completed with a voice recognition program. Efforts were made to edit dictation, but occasionally words are mistranscribed.       This document signed by Amor Tabares, June 1, 2022, 12:56 EDT

## 2022-06-02 ENCOUNTER — OFFICE VISIT (OUTPATIENT)
Dept: PSYCHIATRY | Facility: HOSPITAL | Age: 37
End: 2022-06-02

## 2022-06-02 DIAGNOSIS — F15.20 METHAMPHETAMINE USE DISORDER, SEVERE, DEPENDENCE: Primary | ICD-10-CM

## 2022-06-02 DIAGNOSIS — F11.20 OPIOID USE DISORDER, SEVERE, DEPENDENCE: ICD-10-CM

## 2022-06-02 PROCEDURE — H0015 ALCOHOL AND/OR DRUG SERVICES: HCPCS | Performed by: NURSE PRACTITIONER

## 2022-06-02 NOTE — PROGRESS NOTES
NAME: Ada Vogt  DATE: 06/01/2022    [facility] GROUP   [id number]    Number of participants: 8  IOP GROUP NOTE     DATA:     3 hour IOP group therapy session (Check-ins, Coping Skills, Relapse Prevention)     Check Ins: Therapist continued facilitation of rapport building strategies between group members. Therapist asked that each patient check in with home life and recovery efforts and identify triggers, cravings, and high risk situations that arise between group sessions. Therapist provided empathy and support during group session.     Session Content/Coping Skills: Introductions completed. Check ins completed by group members. Clinician processed updates since last group meeting. Therapist Aid “The [employer] [place]” reviewed with group members. Clinician reviewed article “The Many Sides of Denial in Addiction” (The [facility]) with group members. Clinician discussed the risk of denial in addiction recovery. Group members discussed which types of denial they have experienced and if they are currently displaying any of these types of denial.     Response: Patient attended -[facility] in person. Patient participated in completion of check in form.   Patient on check in form denied suicidal or homicidal thoughts or plan or intent to hurt self or others currently or since last group meeting.   Patient participated in introductions, group discussions, review of psychoeducational material, and discussion of denial in addiction recovery.     Personal Assessment 0-10 Scale (10 worst)    Anxiety:  0   Depression:  0   Cravings: 0     ASSESSMENT:     ..  Lab on 06/01/2022   Component Date Value Ref Range Status   • THC, Screen, Urine 06/01/2022 Negative  Negative Final   • Phencyclidine (PCP), Urine 06/01/2022 Negative  Negative Final   • Cocaine Screen, Urine 06/01/2022 Negative  Negative Final   • Methamphetamine, Ur 06/01/2022 Negative  Negative Final   • Opiate Screen 06/01/2022 Negative  Negative  Final   • Amphetamine Screen, Urine 06/01/2022 Negative  Negative Final   • Benzodiazepine Screen, Urine 06/01/2022 Negative  Negative Final   • Tricyclic Antidepressants Screen 06/01/2022 Negative  Negative Final   • Methadone Screen, Urine 06/01/2022 Positive (A) Negative Final   • Barbiturates Screen, Urine 06/01/2022 Negative  Negative Final   • Oxycodone Screen, Urine 06/01/2022 Negative  Negative Final   • Propoxyphene Screen 06/01/2022 Negative  Negative Final   • Buprenorphine, Screen, Urine 06/01/2022 Negative  Negative Final   Lab on 05/25/2022   Component Date Value Ref Range Status   • THC, Screen, Urine 05/25/2022 Negative  Negative Final   • Phencyclidine (PCP), Urine 05/25/2022 Negative  Negative Final   • Cocaine Screen, Urine 05/25/2022 Negative  Negative Final   • Methamphetamine, Ur 05/25/2022 Negative  Negative Final   • Opiate Screen 05/25/2022 Negative  Negative Final   • Amphetamine Screen, Urine 05/25/2022 Negative  Negative Final   • Benzodiazepine Screen, Urine 05/25/2022 Negative  Negative Final   • Tricyclic Antidepressants Screen 05/25/2022 Negative  Negative Final   • Methadone Screen, Urine 05/25/2022 Positive (A) Negative Final   • Barbiturates Screen, Urine 05/25/2022 Negative  Negative Final   • Oxycodone Screen, Urine 05/25/2022 Negative  Negative Final   • Propoxyphene Screen 05/25/2022 Negative  Negative Final   • Buprenorphine, Screen, Urine 05/25/2022 Negative  Negative Final   • Ethanol, Urine 05/25/2022 Negative  Cutoff=0.020 % Final   Lab on 05/18/2022   Component Date Value Ref Range Status   • THC, Screen, Urine 05/18/2022 Negative  Negative Final   • Phencyclidine (PCP), Urine 05/18/2022 Negative  Negative Final   • Cocaine Screen, Urine 05/18/2022 Negative  Negative Final   • Methamphetamine, Ur 05/18/2022 Negative  Negative Final   • Opiate Screen 05/18/2022 Negative  Negative Final   • Amphetamine Screen, Urine 05/18/2022 Negative  Negative Final   • Benzodiazepine  Screen, Urine 05/18/2022 Negative  Negative Final   • Tricyclic Antidepressants Screen 05/18/2022 Negative  Negative Final   • Methadone Screen, Urine 05/18/2022 Positive (A) Negative Final   • Barbiturates Screen, Urine 05/18/2022 Negative  Negative Final   • Oxycodone Screen, Urine 05/18/2022 Negative  Negative Final   • Propoxyphene Screen 05/18/2022 Negative  Negative Final   • Buprenorphine, Screen, Urine 05/18/2022 Negative  Negative Final   • Ethanol, Urine 05/18/2022 Negative  Cutoff=0.020 % Final   • Methadone 05/18/2022 +POSITIVE+   Final   • Methadone, Quantitative 05/18/2022 >9434  ng/mg creat Final   • EDDP 05/18/2022 >9434  ng/mg creat Final   • Level of Detection: 05/18/2022 Comment   Final    Testing Threshold:  50 ng/mL                                                                       '  This test was developed and its performance characteristics  determined by [facility].  It has not been cleared or approved  by the Food and Drug Administration.       Mental Status Exam  Hygiene:  good  Dress: casual  Attitude: cooperative and agreeable   Motor Activity: appropriate  Eye Contact:  good  Speech: regular rate and rhythm   Mood:  calm and cooperative  Affect:  Appropriate  Thought Processes:  Linear  Thought Content:  Normal  Suicidal Thoughts:  denies  Homicidal Thoughts:  denies  Crisis Safety Plan: yes, to come to the emergency room.  Hallucinations:  denies  Reliability: fair  Insight: fair  Judgement: fair  Impulse Control: fair    Family issues related to recovery:  No change, see previous encounters    Recovery/spiritual support group attendance: No     Sponsor: Yes     Motivation for treatment: Patient during initial assessment stated her motivation for treatment is “get my baby back” and start better life.    Patient's Support Network Includes: Patient during initial assessment reported that her sober support system is her sponsor, Mari.    Progress toward goal: Not at  goal    Prognosis: Fair with Ongoing Treatment     Self-reported number of days sober: Patient reported 67 on check in form.     ASAM Dimensions:  I.    Intoxication/Withdrawal:  0  (Patient during initial assessment denied active withdrawal symptoms).  II.   Medical Conditions/Complications:  0 (Patient during initial assessment denied medical conditions).  III.  Behavioral/Emotional/Cognitive: 1 (Due to patient report of depression and anxiety during initial assessment).  IV.  Readiness to Change: 1 (Patient is ordered for treatment by DCBS, but patient identified motivation for change during initial assessment).  V.   Relapse Risk: 2 (Due to patient report of recent substance use and report of two weeks longest being clean during initial assessment).  VI:  Recovery Environment: 1 (Patient reports she lives with a friend but reporting needs housing assistance and patient reports friend helping with transportation during initial assessment).  Total ASAM Score = 05      BASELINE SCORES 04/07/2022       KATHY-7   (16)                  PHQ-9   (20)       Updated Scores 5/19/2022  KATHY-7 (11)  PHQ-9 (7)      Patient agreeable to adhere to medication regimen as prescribed and report any side effects. Patient will contact this office, call 911 or present to the nearest emergency room should suicidal or homicidal ideations occur.    Impression/Formulation:    ICD-10-CM ICD-9-CM   1. Methamphetamine use disorder, severe, dependence (HCC)  F15.20 304.40   2. Opioid use disorder, severe, dependence (HCC)  F11.20 304.00       CLINICAL MANUVERING/INTERVENTIONS: Therapist utilized a person-centered approach to build rapport with group member.  Therapist implemented motivational interviewing techniques to assist client with exploring and resolving ambivalence associated with commitment to change behaviors related to substance use and addiction.  Therapist applied cognitive behavioral strategies to facilitate identification of  maladaptive patterns of thinking and behavior that contribute to client’s risk for continued substance use and relapse.  Therapist employed group interaction activities to build rapport among group members, promote sobriety, and emphasize relapse prevention.  Therapist promoted safe nonjudgmental environment by providing group members with unconditional positive regard and encouraging group members to comply with group rules and guidelines. Therapist assisted group member with identifying and implementing healthier coping strategies.     PLAN:  Continue [facility] IOP Phase I   Aftercare:  [facility] Phase II  Program Assignments:  Personal recovery plan, relapse prevention plan, attendance of recovery support group meetings, exploration of sponsorship, drug/alcohol screens.     Please note that portions of this note were completed with a voice recognition program. Efforts were made to edit dictation, but occasionally words are mistranscribed.       This document signed by Amor Tabares, June 2, 2022, 16:46 EDT

## 2022-06-03 LAB
EDDP/CREAT UR: >6623 NG/MG CREAT
ETHANOL UR-MCNC: NEGATIVE %
LEVEL OF DETECTION:: NORMAL
METHADONE UR QL CFM: NORMAL
METHADONE/CREAT UR: >6623 NG/MG CREAT

## 2022-06-06 ENCOUNTER — OFFICE VISIT (OUTPATIENT)
Dept: PSYCHIATRY | Facility: HOSPITAL | Age: 37
End: 2022-06-06

## 2022-06-06 DIAGNOSIS — F15.20 METHAMPHETAMINE USE DISORDER, SEVERE, DEPENDENCE: Primary | ICD-10-CM

## 2022-06-06 DIAGNOSIS — F11.20 OPIOID USE DISORDER, SEVERE, DEPENDENCE: ICD-10-CM

## 2022-06-06 PROCEDURE — H0015 ALCOHOL AND/OR DRUG SERVICES: HCPCS | Performed by: NURSE PRACTITIONER

## 2022-06-07 ENCOUNTER — LAB (OUTPATIENT)
Dept: LAB | Facility: HOSPITAL | Age: 37
End: 2022-06-07

## 2022-06-07 DIAGNOSIS — F15.20 METHAMPHETAMINE USE DISORDER, SEVERE, DEPENDENCE: ICD-10-CM

## 2022-06-07 DIAGNOSIS — F11.20 OPIOID USE DISORDER, SEVERE, DEPENDENCE: ICD-10-CM

## 2022-06-08 ENCOUNTER — LAB (OUTPATIENT)
Dept: LAB | Facility: HOSPITAL | Age: 37
End: 2022-06-08

## 2022-06-08 ENCOUNTER — TELEMEDICINE (OUTPATIENT)
Dept: PSYCHIATRY | Facility: CLINIC | Age: 37
End: 2022-06-08

## 2022-06-08 DIAGNOSIS — F11.21 OPIOID USE DISORDER, SEVERE, IN EARLY REMISSION, ON MAINTENANCE THERAPY, DEPENDENCE (HCC): ICD-10-CM

## 2022-06-08 DIAGNOSIS — F41.1 GENERALIZED ANXIETY DISORDER: ICD-10-CM

## 2022-06-08 DIAGNOSIS — F11.20 OPIOID USE DISORDER, SEVERE, DEPENDENCE: ICD-10-CM

## 2022-06-08 DIAGNOSIS — F15.20 METHAMPHETAMINE USE DISORDER, SEVERE, DEPENDENCE: Primary | ICD-10-CM

## 2022-06-08 PROCEDURE — G0480 DRUG TEST DEF 1-7 CLASSES: HCPCS

## 2022-06-08 PROCEDURE — 80307 DRUG TEST PRSMV CHEM ANLYZR: CPT

## 2022-06-08 PROCEDURE — 90853 GROUP PSYCHOTHERAPY: CPT | Performed by: SOCIAL WORKER

## 2022-06-08 PROCEDURE — 90785 PSYTX COMPLEX INTERACTIVE: CPT | Performed by: SOCIAL WORKER

## 2022-06-08 NOTE — PROGRESS NOTES
NAME: Ada Vogt  DATE: 06/02/2022     IOP GROUP   [id number]    Number of participants: 7  IOP GROUP NOTE     DATA:     3 hour IOP group therapy session (Check-ins, Coping Skills, Relapse Prevention)     Check Ins: Therapist continued facilitation of rapport building strategies between group members. Therapist asked that each patient check in with home life and recovery efforts and identify triggers, cravings, and high risk situations that arise between group sessions. Therapist provided empathy and support during group session.     Session Content/Coping Skills: Introductions completed.  and  joined for first part of meeting.  discussed with group resources available. Check in’s completed by group members. PAWS and PAWS Tools psychoeducational material reviewed and discussed with group members. Stages of Relapse psychoeducational material ([facility]) reviewed. Group members began developing their relapse prevention plans and sharing with the group.     Response: Patient attended class in person. Patient participated in completion of check in form.   Patient on check in form denied suicidal or homicidal thoughts or plan or intent to hurt self or others currently or since last group meeting. Patient answered no to all other questions on check in form. Patient participated in introductions, review of psychoeducational material, and relapse prevention plan development.     Personal Assessment 0-10 Scale (10 worst)    Anxiety:  3   Depression:  2   Cravings: 4     ASSESSMENT:     ..  Lab on 06/01/2022   Component Date Value Ref Range Status   • THC, Screen, Urine 06/01/2022 Negative  Negative Final   • Phencyclidine (PCP), Urine 06/01/2022 Negative  Negative Final   • Cocaine Screen, Urine 06/01/2022 Negative  Negative Final   • Methamphetamine, Ur 06/01/2022 Negative  Negative Final   • Opiate Screen 06/01/2022 Negative  Negative Final   • Amphetamine  Screen, Urine 06/01/2022 Negative  Negative Final   • Benzodiazepine Screen, Urine 06/01/2022 Negative  Negative Final   • Tricyclic Antidepressants Screen 06/01/2022 Negative  Negative Final   • Methadone Screen, Urine 06/01/2022 Positive (A) Negative Final   • Barbiturates Screen, Urine 06/01/2022 Negative  Negative Final   • Oxycodone Screen, Urine 06/01/2022 Negative  Negative Final   • Propoxyphene Screen 06/01/2022 Negative  Negative Final   • Buprenorphine, Screen, Urine 06/01/2022 Negative  Negative Final   • Ethanol, Urine 06/01/2022 Negative  Cutoff=0.020 % Final   Lab on 05/25/2022   Component Date Value Ref Range Status   • THC, Screen, Urine 05/25/2022 Negative  Negative Final   • Phencyclidine (PCP), Urine 05/25/2022 Negative  Negative Final   • Cocaine Screen, Urine 05/25/2022 Negative  Negative Final   • Methamphetamine, Ur 05/25/2022 Negative  Negative Final   • Opiate Screen 05/25/2022 Negative  Negative Final   • Amphetamine Screen, Urine 05/25/2022 Negative  Negative Final   • Benzodiazepine Screen, Urine 05/25/2022 Negative  Negative Final   • Tricyclic Antidepressants Screen 05/25/2022 Negative  Negative Final   • Methadone Screen, Urine 05/25/2022 Positive (A) Negative Final   • Barbiturates Screen, Urine 05/25/2022 Negative  Negative Final   • Oxycodone Screen, Urine 05/25/2022 Negative  Negative Final   • Propoxyphene Screen 05/25/2022 Negative  Negative Final   • Buprenorphine, Screen, Urine 05/25/2022 Negative  Negative Final   • Ethanol, Urine 05/25/2022 Negative  Cutoff=0.020 % Final   • Methadone 05/25/2022 +POSITIVE+   Final   • Methadone, Quantitative 05/25/2022 >6623  ng/mg creat Final   • EDDP 05/25/2022 >6623  ng/mg creat Final   • Level of Detection: 05/25/2022 Comment   Final    Testing Threshold:  50 ng/mL                                                                       '  This test was developed and its performance characteristics  determined by [facility].  It has not been  cleared or approved  by the Food and Drug Administration.   Lab on 05/18/2022   Component Date Value Ref Range Status   • THC, Screen, Urine 05/18/2022 Negative  Negative Final   • Phencyclidine (PCP), Urine 05/18/2022 Negative  Negative Final   • Cocaine Screen, Urine 05/18/2022 Negative  Negative Final   • Methamphetamine, Ur 05/18/2022 Negative  Negative Final   • Opiate Screen 05/18/2022 Negative  Negative Final   • Amphetamine Screen, Urine 05/18/2022 Negative  Negative Final   • Benzodiazepine Screen, Urine 05/18/2022 Negative  Negative Final   • Tricyclic Antidepressants Screen 05/18/2022 Negative  Negative Final   • Methadone Screen, Urine 05/18/2022 Positive (A) Negative Final   • Barbiturates Screen, Urine 05/18/2022 Negative  Negative Final   • Oxycodone Screen, Urine 05/18/2022 Negative  Negative Final   • Propoxyphene Screen 05/18/2022 Negative  Negative Final   • Buprenorphine, Screen, Urine 05/18/2022 Negative  Negative Final   • Ethanol, Urine 05/18/2022 Negative  Cutoff=0.020 % Final   • Methadone 05/18/2022 +POSITIVE+   Final   • Methadone, Quantitative 05/18/2022 >9434  ng/mg creat Final   • EDDP 05/18/2022 >9434  ng/mg creat Final   • Level of Detection: 05/18/2022 Comment   Final    Testing Threshold:  50 ng/mL                                                                       '  This test was developed and its performance characteristics  determined by [facility].  It has not been cleared or approved  by the Food and Drug Administration.       Mental Status Exam  Hygiene:  good  Dress: casual  Attitude: cooperative and agreeable   Motor Activity: appropriate  Eye Contact:  good  Speech: regular rate and rhythm   Mood:  calm and cooperative  Affect:  Appropriate  Thought Processes:  Linear  Thought Content:  Normal  Suicidal Thoughts:  denies  Homicidal Thoughts:  denies  Crisis Safety Plan: yes, to come to the emergency room.  Hallucinations:  denies  Reliability: fair  Insight:  fair  Judgement: fair  Impulse Control: fair    Family issues related to recovery:  No change, see previous encounters    Recovery/spiritual support group attendance: No     Sponsor: Yes    Motivation for treatment: Patient during initial assessment stated her motivation for treatment is “get my baby back” and start better life.    Patient's Support Network Includes: Patient during initial assessment reported that her sober support system is her sponsor, Mari.    Progress toward goal: Not at goal    Prognosis: Fair with Ongoing Treatment     Self-reported number of days sober: Patient reported 68 on check in form.     ASAM Dimensions:  I.    Intoxication/Withdrawal:  0  (Patient during initial assessment denied active withdrawal symptoms).  II.   Medical Conditions/Complications:  0 (Patient during initial assessment denied medical conditions).  III.  Behavioral/Emotional/Cognitive: 1 (Due to patient report of depression and anxiety during initial assessment).  IV.  Readiness to Change: 1 (Patient is ordered for treatment by DCBS, but patient identified motivation for change during initial assessment).  V.   Relapse Risk: 2 (Due to patient report of recent substance use and report of two weeks longest being clean during initial assessment).  VI:  Recovery Environment: 1 (Patient reports she lives with a friend but reporting needs housing assistance and patient reports friend helping with transportation during initial assessment).  Total ASAM Score = 05      BASELINE SCORES 04/07/2022       KATHY-7   (16)                  PHQ-9   (20)       Updated Scores 5/19/2022  KATHY-7 (11)  PHQ-9 (7)      Patient agreeable to adhere to medication regimen as prescribed and report any side effects. Patient will contact this office, call 911 or present to the nearest emergency room should suicidal or homicidal ideations occur.    Impression/Formulation:    ICD-10-CM ICD-9-CM   1. Methamphetamine use disorder, severe, dependence  ([facility])  F15.20 304.40   2. Opioid use disorder, severe, dependence ([facility])  F11.20 304.00       CLINICAL MANUVERING/INTERVENTIONS: Therapist utilized a person-centered approach to build rapport with group member.  Therapist implemented motivational interviewing techniques to assist client with exploring and resolving ambivalence associated with commitment to change behaviors related to substance use and addiction.  Therapist applied cognitive behavioral strategies to facilitate identification of maladaptive patterns of thinking and behavior that contribute to client’s risk for continued substance use and relapse.  Therapist employed group interaction activities to build rapport among group members, promote sobriety, and emphasize relapse prevention.  Therapist promoted safe nonjudgmental environment by providing group members with unconditional positive regard and encouraging group members to comply with group rules and guidelines. Therapist assisted group member with identifying and implementing healthier coping strategies.      PLAN:  Continue [facility] IOP Phase I   Aftercare:  [facility] Phase II  Program Assignments:  Personal recovery plan, relapse prevention plan, attendance of recovery support group meetings, exploration of sponsorship, drug/alcohol screens.     Please note that portions of this note were completed with a voice recognition program. Efforts were made to edit dictation, but occasionally words are mistranscribed.       This document signed by Amor Tabares, June 8, 2022, 10:10 EDT

## 2022-06-09 LAB
EDDP/CREAT UR: NORMAL NG/MG CREAT
LEVEL OF DETECTION:: NORMAL
METHADONE UR QL CFM: NORMAL
METHADONE/CREAT UR: 8025 NG/MG CREAT

## 2022-06-09 NOTE — PROGRESS NOTES
NAME: Ada Vogt  DATE: 06/06/2022     IOP GROUP   [id number]    Number of participants: 7  IOP GROUP NOTE     DATA:     3 hour IOP group therapy session (Check-ins, Coping Skills, Relapse Prevention)     Check Ins: Therapist continued facilitation of rapport building strategies between group members. Therapist asked that each patient check in with home life and recovery efforts and identify triggers, cravings, and high risk situations that arise between group sessions. Therapist provided empathy and support during group session.     Session Content/Coping Skills:  sat in for first part of meeting. Substance Abuse Crossword Activity Completed ([facility] [employer]). Check in forms completed by group members. Relapse Prevention Plans developed and group members shared relapse prevention plans.     Response: Patient attended class in person. Patient participated in completion of check in form.   Patient on check in form denied suicidal or homicidal thoughts or plan or intent to hurt self or others currently or since last group meeting. Patient participated in group discussions and sharing relapse prevention plans. Patient participated in treatment plan update and patient verbally agreed to treatment plan. Patient participated in completion of PHQ-9 and scored a 03. Patient participated in completion of KATHY-7 and scored a 03.    Personal Assessment 0-10 Scale (10 worst)    Anxiety:  2   Depression:  4   Cravings: 1     ASSESSMENT:     ..  Lab on 06/01/2022   Component Date Value Ref Range Status   • THC, Screen, Urine 06/01/2022 Negative  Negative Final   • Phencyclidine (PCP), Urine 06/01/2022 Negative  Negative Final   • Cocaine Screen, Urine 06/01/2022 Negative  Negative Final   • Methamphetamine, Ur 06/01/2022 Negative  Negative Final   • Opiate Screen 06/01/2022 Negative  Negative Final   • Amphetamine Screen, Urine 06/01/2022 Negative  Negative Final   •  Benzodiazepine Screen, Urine 06/01/2022 Negative  Negative Final   • Tricyclic Antidepressants Screen 06/01/2022 Negative  Negative Final   • Methadone Screen, Urine 06/01/2022 Positive (A) Negative Final   • Barbiturates Screen, Urine 06/01/2022 Negative  Negative Final   • Oxycodone Screen, Urine 06/01/2022 Negative  Negative Final   • Propoxyphene Screen 06/01/2022 Negative  Negative Final   • Buprenorphine, Screen, Urine 06/01/2022 Negative  Negative Final   • Ethanol, Urine 06/01/2022 Negative  Cutoff=0.020 % Final   Lab on 05/25/2022   Component Date Value Ref Range Status   • THC, Screen, Urine 05/25/2022 Negative  Negative Final   • Phencyclidine (PCP), Urine 05/25/2022 Negative  Negative Final   • Cocaine Screen, Urine 05/25/2022 Negative  Negative Final   • Methamphetamine, Ur 05/25/2022 Negative  Negative Final   • Opiate Screen 05/25/2022 Negative  Negative Final   • Amphetamine Screen, Urine 05/25/2022 Negative  Negative Final   • Benzodiazepine Screen, Urine 05/25/2022 Negative  Negative Final   • Tricyclic Antidepressants Screen 05/25/2022 Negative  Negative Final   • Methadone Screen, Urine 05/25/2022 Positive (A) Negative Final   • Barbiturates Screen, Urine 05/25/2022 Negative  Negative Final   • Oxycodone Screen, Urine 05/25/2022 Negative  Negative Final   • Propoxyphene Screen 05/25/2022 Negative  Negative Final   • Buprenorphine, Screen, Urine 05/25/2022 Negative  Negative Final   • Ethanol, Urine 05/25/2022 Negative  Cutoff=0.020 % Final   • Methadone 05/25/2022 +POSITIVE+   Final   • Methadone, Quantitative 05/25/2022 >6623  ng/mg creat Final   • EDDP 05/25/2022 >6623  ng/mg creat Final   • Level of Detection: 05/25/2022 Comment   Final    Testing Threshold:  50 ng/mL                                                                       '  This test was developed and its performance characteristics  determined by [facility].  It has not been cleared or approved  by the Food and Drug  Administration.   Lab on 05/18/2022   Component Date Value Ref Range Status   • THC, Screen, Urine 05/18/2022 Negative  Negative Final   • Phencyclidine (PCP), Urine 05/18/2022 Negative  Negative Final   • Cocaine Screen, Urine 05/18/2022 Negative  Negative Final   • Methamphetamine, Ur 05/18/2022 Negative  Negative Final   • Opiate Screen 05/18/2022 Negative  Negative Final   • Amphetamine Screen, Urine 05/18/2022 Negative  Negative Final   • Benzodiazepine Screen, Urine 05/18/2022 Negative  Negative Final   • Tricyclic Antidepressants Screen 05/18/2022 Negative  Negative Final   • Methadone Screen, Urine 05/18/2022 Positive (A) Negative Final   • Barbiturates Screen, Urine 05/18/2022 Negative  Negative Final   • Oxycodone Screen, Urine 05/18/2022 Negative  Negative Final   • Propoxyphene Screen 05/18/2022 Negative  Negative Final   • Buprenorphine, Screen, Urine 05/18/2022 Negative  Negative Final   • Ethanol, Urine 05/18/2022 Negative  Cutoff=0.020 % Final   • Methadone 05/18/2022 +POSITIVE+   Final   • Methadone, Quantitative 05/18/2022 >9434  ng/mg creat Final   • EDDP 05/18/2022 >9434  ng/mg creat Final   • Level of Detection: 05/18/2022 Comment   Final    Testing Threshold:  50 ng/mL                                                                       '  This test was developed and its performance characteristics  determined by [facility].  It has not been cleared or approved  by the Food and Drug Administration.       Mental Status Exam  Hygiene:  good  Dress: casual  Attitude: cooperative and agreeable   Motor Activity: appropriate  Eye Contact:  good  Speech: regular rate and rhythm   Mood:  calm and cooperative  Affect:  Appropriate  Thought Processes:  Linear  Thought Content:  Normal  Suicidal Thoughts:  denies  Homicidal Thoughts:  denies  Crisis Safety Plan: yes, to come to the emergency room.  Hallucinations:  denies  Reliability: fair  Insight: fair  Judgement: fair  Impulse Control: fair    Family  issues related to recovery:  No change, see previous encounters    Recovery/spiritual support group attendance: Patient reported 1, [place].      Sponsor: Yes     Motivation for treatment: Patient during initial assessment stated her motivation for treatment is “get my baby back” and start better life.    Patient's Support Network Includes: Patient during initial assessment reported that her sober support system is her sponsor, Mari.    Progress toward goal: Not at goal    Prognosis: Fair with Ongoing Treatment     Self-reported number of days sober: Patient reported 70 on check in form.     ASAM Dimensions:  I.    Intoxication/Withdrawal:  0  (Patient during initial assessment denied active withdrawal symptoms).  II.   Medical Conditions/Complications:  0 (Patient during initial assessment denied medical conditions).  III.  Behavioral/Emotional/Cognitive: 1 (Due to patient report of depression and anxiety during initial assessment).  IV.  Readiness to Change: 1 (Patient is ordered for treatment by DCBS, but patient identified motivation for change during initial assessment).  V.   Relapse Risk: 2 (Due to patient report of recent substance use and report of two weeks longest being clean during initial assessment).  VI:  Recovery Environment: 1 (Patient reports she lives with a friend but reporting needs housing assistance and patient reports friend helping with transportation during initial assessment).  Total ASAM Score = 05      BASELINE SCORES 04/07/2022       KATHY-7   (16)                  PHQ-9   (20)       Updated Scores 6/6/2022  KATHY-7 (03)  PHQ-9 (03)    Patient agreeable to adhere to medication regimen as prescribed and report any side effects. Patient will contact this office, call 911 or present to the nearest emergency room should suicidal or homicidal ideations occur.    Impression/Formulation:    ICD-10-CM ICD-9-CM   1. Methamphetamine use disorder, severe, dependence (HCC)  F15.20 304.40   2.  Opioid use disorder, severe, dependence ([facility])  F11.20 304.00       CLINICAL MANUVERING/INTERVENTIONS: Therapist utilized a person-centered approach to build rapport with group member.  Therapist implemented motivational interviewing techniques to assist client with exploring and resolving ambivalence associated with commitment to change behaviors related to substance use and addiction.  Therapist applied cognitive behavioral strategies to facilitate identification of maladaptive patterns of thinking and behavior that contribute to client’s risk for continued substance use and relapse.  Therapist employed group interaction activities to build rapport among group members, promote sobriety, and emphasize relapse prevention.  Therapist promoted safe nonjudgmental environment by providing group members with unconditional positive regard and encouraging group members to comply with group rules and guidelines. Therapist assisted group member with identifying and implementing healthier coping strategies.      PLAN:  Continue [facility] IOP Phase I   Aftercare:  [facility] Phase II  Program Assignments:  Personal recovery plan, relapse prevention plan, attendance of recovery support group meetings, exploration of sponsorship, drug/alcohol screens.     Please note that portions of this note were completed with a voice recognition program. Efforts were made to edit dictation, but occasionally words are mistranscribed.       This document signed by Amor Tabares, June 9, 2022, 15:48 EDT

## 2022-06-10 LAB — ETHANOL UR-MCNC: NEGATIVE %

## 2022-06-13 ENCOUNTER — DOCUMENTATION (OUTPATIENT)
Dept: PSYCHIATRY | Facility: HOSPITAL | Age: 37
End: 2022-06-13

## 2022-06-13 ENCOUNTER — TELEMEDICINE (OUTPATIENT)
Dept: PSYCHIATRY | Facility: CLINIC | Age: 37
End: 2022-06-13

## 2022-06-13 DIAGNOSIS — F11.21 OPIOID USE DISORDER, SEVERE, IN EARLY REMISSION, ON MAINTENANCE THERAPY, DEPENDENCE (HCC): ICD-10-CM

## 2022-06-13 DIAGNOSIS — F15.20 METHAMPHETAMINE USE DISORDER, SEVERE, DEPENDENCE: Primary | ICD-10-CM

## 2022-06-13 DIAGNOSIS — F41.1 GENERALIZED ANXIETY DISORDER: ICD-10-CM

## 2022-06-13 PROCEDURE — 90785 PSYTX COMPLEX INTERACTIVE: CPT | Performed by: SOCIAL WORKER

## 2022-06-13 PROCEDURE — 90853 GROUP PSYCHOTHERAPY: CPT | Performed by: SOCIAL WORKER

## 2022-06-13 NOTE — PROGRESS NOTES
[facility]  [address], [address]  [place] [address]      Intensive Outpatient Program for Chemical Dependence (CD IOP)  Verbally Reviewed on 6/6/2022    Treatment Plan Reassessment (biweekly)       Long Term Goal: Ada Vogt will establish a sustained recovery and will maintain total abstinence from mind-altering substances other than what is prescribed and/or approved by the attending physician. Pt will learn, practice, and utilize behavioral and cognitive coping skills to help maintain sobriety.    Patient Care Needs:  Ada Vogt presents with Methamphetamine use disorder, severe, dependence and Opioid use disorder, severe, dependence and is at high risk for relapse without continued treatment.  Pt has a history of using drugs/alcohol until intoxicated or passed out and has been unable to stop or cut down use of alcohol/drugs once starting, despite verbalized desire to do so, and the negative consequences from continued use.  Pt's use has negatively impacted social, occupational, and/or physical functioning.     1.  Patient will participate in a medical evaluation to assess the effects of chemical dependence and will cooperate with an evaluation by the attending psychiatrist or psychiatric nurse practitioner for psychotropic medication if appropriate.      2.  Patient will adhere to the IOP group rules.        3.  Patient will attend group sessions as scheduled. Patient will notify therapist and support staff of any absences or tardies. Patient will provide a valid and verifiable excuse for absences to be considered excused.    4.  Patient will participate in random drug and alcohol screenings and will exhibit negative results on said screenings.    5.  Patient will identify high risk situations, people, and places to avoid or manage in order to maintain sobriety.    6.  Patient will participate in group discussions by giving and receiving feedback  appropriately.      7.  Patient will develop a positive network of support by attending a minimum of two recovery/spiritual support groups each week (two outside of IOP group) and by exploring, obtaining or maintaining a sponsor or sober support person.      8.  Patient will identify, practice, and implement at least 5 healthy coping strategies to manage difficult emotions while remaining abstinent.    9.   Patient will develop relapse prevention skills and be able to identify and share them with the group in the form of a relapse prevention plan.    10.  Patient will write a personal recovery plan and share it with the group prior to discharge.    11.  Patient will offer family participation in family education groups, if appropriate.    12.  Patient will exhibit increased motivation to change as assessed by his/her cooperation and behavior and the ASAM assessment tool.    13.  Patient will utilize healthy coping skills to manage depressive and anxious symptoms without using alcohol or other mind-altering substances and will exhibit decreased score on the PHQ-9 and KATHY-7.   1. Patient participated in an evaluation for medication management with YULI Lea. Patient is meeting with medical provider as scheduled for medication management and follow-up.          2.  Patient read and signed IOP Group Rules. Patient is adhering to group rules.      3.  Patient has 0 unexcused absences.                  4.  Patient has displayed positive drug screens for methadone, however, patient has returned verification that this is prescribed by [facility].        5.  Patient has encountered the following high-risk situations since beginning treatment: Patient reported being triggered at the place that she used to live.      6.  Patient provides and receives feedback daily. When not actively participating, patient is attentive and appropriate.    7.  Patient is attending recovery/spiritual support group  meetings at least once times per week. Patient has a sponsor/sober support person and is working the 12 steps. Patient reports that she is going to 5 meetings per week.     8.  Patient has identified and implemented the following coping strategies during high risk situations: Patient reports self-care, going for drive, going to new place, gratitudes, calling sponsor.         9.  Patient has identified high-risk people, places, and things as well as healthy coping strategies for avoiding relapse.       10.  Patient has developed and shared relapse prevention plan.      11. No family participation.          12.  Patient reports cravings were a 1 on a 0:10 scale on check in completed on 6/6/2022. Patient's behavior indicates ongoing motivation for sobriety.  ASAM indicates 05.     13. Pt is managing anxiety and or depression without using mind-altering substances. Patient scored a 03 on KATHY-7 completed on 6/6/2022. Patient scored a 03 on PHQ-9 completed on 6/6/2022.      1.  Partially completed.  Patient participated initial evaluation for medication management with YULI Lea.  Patient will continue participating in as scheduled medication management and follow-up appointments with psychiatric medical provider.    2.  Patient is making progress toward goal and will continue working toward objective.     3.  Patient will continue working toward objective.                   4.  Patient will continue working toward objective.          5.  Partially completed. Patient will continue working toward objective.      6.  Partially completed. Patient will continue working toward objective.        7.  Patient is making progress toward goal.                   8.  Partially completed. Patient will continue working toward objective.            9.  Partially completed. Patient will continue working toward objective.           10.  Partially completed. Patient will continue working toward objective.       11.  Patient will  continue to encourage family participation.      12.  Partially completed.  Patient will continue implementing behavioral changes to promote long-term sobriety and recovery.    13. Partially completed.  Anxiety and depression will continue to be monitored.       Team Members:  Patient - Verbally Agreed on 6/6/2022  Medical Provider - YULI Lea  [facility] Licensed Therapist - Amor Tabares [facility], [facility] Director - Dimple Null, [facility]  Support Staff

## 2022-06-13 NOTE — PROGRESS NOTES
[facility]  [address], [address]  [phone]    CHEMICAL DEPENDENCY   INTENSIVE OUTPATIENT PROGRAM    PHASE I  DISCHARGE SUMMARY        ATTENDING: YULI Lea  THERAPIST: DARREN Panda CSW    DATE OF ADMISSION: 4/11/2022 (Date of first class attended)  DATE OF DISCHARGE: 6/8/2022        REASON FOR ADMISSION: Ada Vogt was referred by DCBS and admitted to IOP Phase I for Methamphetamine use disorder, severe, dependence and Opioid use disorder, severe, dependence.     SUMMARY OF CARE, TREATMENT, and SERVICES PROVIDED: Ada Vogt was assessed and determined to meet [facility] level of care on 4/7/2022. Pt began IOP on 4/11/2022 and attended 24 group therapy sessions. Pt had 0 unexcused and 1 excused absences.  Pt exhibited consistent motivation for sobriety as indicated by ASAM scores over the course of treatment, indicating increased confidence in the ability to stay sober. Patient has displayed positive drug screens for methadone, however, patient has returned verification that this is prescribed by [facility]      Patient completed the following treatment goals: identified high-risk people, places, and situations to avoid in order to prevent relapse; learned and utilized at least 5 health coping skills to use when faced with high-risk situations; decreased KATHY-7 score; decreased PHQ-9 score; attendance of group therapy sessions as scheduled; participation during group by providing and receiving feedback; attendance of recovery/spiritual support group meetings weekly; obtaining a sponsor or sober support; creating a relapse prevention plan.     AFTERCARE PLAN: Ada Vogt completed [facility] Chemical Dependency IOP Phase I on 6/6/2022. Pt will be admitted to [facility] Phase II and will attend Phase II on the next scheduled date and will participate in individual therapy as needed.  Pt was encouraged to continue  recovery/spiritual support group meetings and daily communication with sponsor/sober support system.      Current Outpatient Medications:   •  buPROPion (WELLBUTRIN) 100 MG tablet, Take 1 tablet by mouth 2 (Two) Times a Day., Disp: 30 tablet, Rfl: 2  •  docusate sodium (Colace) 100 MG capsule, Take 1 capsule by mouth 2 (Two) Times a Day As Needed for Constipation., Disp: 60 capsule, Rfl: 2  •  methadone (DOLOPHINE) 10 MG tablet, Take 35 mg by mouth Daily,, Disp: , Rfl:   •  Nexplanon 68 MG implant subdermal implant, , Disp: , Rfl:      PROGNOSIS: good with continued care.    -Amor Tabares, VITAW, CSW

## 2022-06-16 ENCOUNTER — LAB (OUTPATIENT)
Dept: LAB | Facility: HOSPITAL | Age: 37
End: 2022-06-16

## 2022-06-16 DIAGNOSIS — F11.20 OPIOID USE DISORDER, SEVERE, DEPENDENCE: ICD-10-CM

## 2022-06-16 DIAGNOSIS — F15.20 METHAMPHETAMINE USE DISORDER, SEVERE, DEPENDENCE: ICD-10-CM

## 2022-06-16 PROCEDURE — 80307 DRUG TEST PRSMV CHEM ANLYZR: CPT

## 2022-06-16 PROCEDURE — G0480 DRUG TEST DEF 1-7 CLASSES: HCPCS

## 2022-06-17 LAB
EDDP/CREAT UR: 5439 NG/MG CREAT
ETHANOL UR-MCNC: NEGATIVE %
LEVEL OF DETECTION:: NORMAL
METHADONE UR QL CFM: NORMAL
METHADONE/CREAT UR: 6891 NG/MG CREAT

## 2022-06-20 ENCOUNTER — TELEMEDICINE (OUTPATIENT)
Dept: PSYCHIATRY | Facility: CLINIC | Age: 37
End: 2022-06-20

## 2022-06-20 DIAGNOSIS — F15.20 METHAMPHETAMINE USE DISORDER, SEVERE, DEPENDENCE: Primary | ICD-10-CM

## 2022-06-20 DIAGNOSIS — F11.21 OPIOID USE DISORDER, SEVERE, IN EARLY REMISSION, ON MAINTENANCE THERAPY, DEPENDENCE (HCC): ICD-10-CM

## 2022-06-20 DIAGNOSIS — F41.1 GENERALIZED ANXIETY DISORDER: ICD-10-CM

## 2022-06-20 PROCEDURE — 90853 GROUP PSYCHOTHERAPY: CPT | Performed by: SOCIAL WORKER

## 2022-06-20 PROCEDURE — 90785 PSYTX COMPLEX INTERACTIVE: CPT | Performed by: SOCIAL WORKER

## 2022-06-22 ENCOUNTER — TELEMEDICINE (OUTPATIENT)
Dept: PSYCHIATRY | Facility: CLINIC | Age: 37
End: 2022-06-22

## 2022-06-22 DIAGNOSIS — F15.20 METHAMPHETAMINE USE DISORDER, SEVERE, DEPENDENCE: Primary | ICD-10-CM

## 2022-06-22 DIAGNOSIS — F11.21 OPIOID USE DISORDER, SEVERE, IN EARLY REMISSION, ON MAINTENANCE THERAPY, DEPENDENCE (HCC): ICD-10-CM

## 2022-06-22 DIAGNOSIS — F41.1 GENERALIZED ANXIETY DISORDER: ICD-10-CM

## 2022-06-22 PROCEDURE — 90785 PSYTX COMPLEX INTERACTIVE: CPT | Performed by: SOCIAL WORKER

## 2022-06-22 PROCEDURE — 90853 GROUP PSYCHOTHERAPY: CPT | Performed by: SOCIAL WORKER

## 2022-06-23 ENCOUNTER — LAB (OUTPATIENT)
Dept: LAB | Facility: HOSPITAL | Age: 37
End: 2022-06-23

## 2022-06-23 DIAGNOSIS — F11.20 OPIOID USE DISORDER, SEVERE, DEPENDENCE: ICD-10-CM

## 2022-06-23 DIAGNOSIS — F15.20 METHAMPHETAMINE USE DISORDER, SEVERE, DEPENDENCE: ICD-10-CM

## 2022-06-23 LAB
AMPHET+METHAMPHET UR QL: NEGATIVE
AMPHETAMINES UR QL: NEGATIVE
BARBITURATES UR QL SCN: NEGATIVE
BENZODIAZ UR QL SCN: NEGATIVE
BUPRENORPHINE SERPL-MCNC: NEGATIVE NG/ML
CANNABINOIDS SERPL QL: NEGATIVE
COCAINE UR QL: NEGATIVE
EDDP/CREAT UR: 6733 NG/MG CREAT
LEVEL OF DETECTION:: NORMAL
METHADONE UR QL CFM: NORMAL
METHADONE UR QL SCN: POSITIVE
METHADONE/CREAT UR: 4571 NG/MG CREAT
OPIATES UR QL: NEGATIVE
OXYCODONE UR QL SCN: NEGATIVE
PCP UR QL SCN: NEGATIVE
PROPOXYPH UR QL: NEGATIVE
TRICYCLICS UR QL SCN: NEGATIVE

## 2022-06-23 PROCEDURE — G0480 DRUG TEST DEF 1-7 CLASSES: HCPCS

## 2022-06-23 PROCEDURE — 80307 DRUG TEST PRSMV CHEM ANLYZR: CPT

## 2022-06-24 LAB — ETHANOL UR-MCNC: NEGATIVE %

## 2022-06-27 NOTE — PROGRESS NOTES
Phase 2 IOP   6/8/2022  [phone]      Data: Group members discussed IOP, their concerns as well as their successes. Did a group activity and ice breakers to get to know new group members.     RESPONSE: Group members discussed struggles and successes since last week. They discussed how they feel  Today. Many are struggling with life stress.       CLINICAL MANEUVERING/INTERVENTIONS: Assisted  member in processing above session content; acknowledged and normalized patient's thoughts, feelings and concerns.  Educated on Phase 2 IOP, relapse prevention and the importance of making goals in phase 2 that assist with individual and group Educated on the importance of having outside support/community support that is not family and being grateful for family support and not relying only on family for their recovery support, and the importance of having meetings, sponsorship and step work. Educated group on the importance of healthy coping skills.     Allowed patient to freely discuss issues without interruption or judgment. Provided safe, confidential environment to facilitate the development of positive therapeutic relationship and encourage open, honest communication. Assisted patient in identifying risk factors which would indicate the need for higher level of care including thoughts to harm self or others and/or self-harming behavior and encouraged patient to contact this office, call 911, or present to the nearest emergency room should any of these events occur. Discussed crisis intervention services and means to access.  Patient adamantly and convincingly denies current suicidal or homicidal ideation or perceptual disturbance.      ASSESSMENT:  Engaged in activity/Process and self-disclosed: Yes or No  Affect ([place] all that apply) appropriate, blunted, flat, sad, angry, tearful, anxious, bright  Applies topic to self: Yes or No  Able to give and receive feedback: Yes or No  Degree of insightful thinking: Least 1  2  3   4  5  6  7 8  9  10  Most    Urinalysis:   Lab on 06/08/2022   Component Date Value Ref Range Status   • Methadone 06/08/2022 +POSITIVE+   Final   • Methadone, Quantitative 06/08/2022 6891  ng/mg creat Final   • EDDP 06/08/2022 5439  ng/mg creat Final   • Level of Detection: 06/08/2022 Comment   Final       12-Step attendance: Frequency/Sponsor?     Identification of environmental and personal barriers to recovery:     Motivation for treatment:     Mental Status Exam  Hygiene:  good  Dress:  casual  Attitude:  Cooperative  Motor Activity:  Restless  Speech:  Rapid  Mood:  anxious  Affect:  anxious  Thought Processes:  Flight of ieas  Thought Content:  normal  Suicidal Thoughts:  denies  Homicidal Thoughts:  denies  Crisis Safety Plan: yes, to come to the emergency room.  Hallucinations:  denies    Assessment     Diagnoses and all orders for this visit:    1. Methamphetamine use disorder, severe, dependence (HCC) (Primary)    2. Opioid use disorder, severe, in early remission, on maintenance therapy, dependence (HCC)    3. Generalized anxiety disorder               PLAN:   Patient will continue in bi-weekly group psychotherapy sessions and individual outpatient psychotherapy sessions every 3-4 weeks and will continue in self-help meetings and seek additional treatment if necessary following completion.    Carlee Caraballo

## 2022-06-27 NOTE — PROGRESS NOTES
Phase 2 IOP   6/20/2022  [id number]      Data: Group members discussed what recovery looks like to them and what does it mean to them    RESPONSE: Group members discussed and was educated on recovery and how that looks differently for each person but similar also.     CLINICAL MANEUVERING/INTERVENTIONS: Assisted  member in processing above session content; acknowledged and normalized patient's thoughts, feelings and concerns.  Educated on Phase 2 IOP, relapse prevention and the importance of making goals in phase 2 that assist with individual and group Educated on the importance of having outside support/community support that is not family and being grateful for family support and not relying only on family for their recovery support, and the importance of having meetings, sponsorship and step work. Educated on recovery and gave an assignment.     Allowed patient to freely discuss issues without interruption or judgment. Provided safe, confidential environment to facilitate the development of positive therapeutic relationship and encourage open, honest communication. Assisted patient in identifying risk factors which would indicate the need for higher level of care including thoughts to harm self or others and/or self-harming behavior and encouraged patient to contact this office, call 911, or present to the nearest emergency room should any of these events occur. Discussed crisis intervention services and means to access.  Patient adamantly and convincingly denies current suicidal or homicidal ideation or perceptual disturbance.      ASSESSMENT:  Engaged in activity/Process and self-disclosed: Yes or No  Affect ([place] all that apply) appropriate, blunted, flat, sad, angry, tearful, anxious, bright  Applies topic to self: Yes or No  Able to give and receive feedback: Yes or No  Degree of insightful thinking: Least 1  2  3  4  5  6  7 8  9  10  Most    Urinalysis:   No visits with results within 1 Day(s) from this  visit.   Latest known visit with results is:   Lab on 06/16/2022   Component Date Value Ref Range Status   • THC, Screen, Urine 06/16/2022 Negative  Negative Final   • Phencyclidine (PCP), Urine 06/16/2022 Negative  Negative Final   • Cocaine Screen, Urine 06/16/2022 Negative  Negative Final   • Methamphetamine, Ur 06/16/2022 Negative  Negative Final   • Opiate Screen 06/16/2022 Negative  Negative Final   • Amphetamine Screen, Urine 06/16/2022 Negative  Negative Final   • Benzodiazepine Screen, Urine 06/16/2022 Negative  Negative Final   • Tricyclic Antidepressants Screen 06/16/2022 Negative  Negative Final   • Methadone Screen, Urine 06/16/2022 Positive (A) Negative Final   • Barbiturates Screen, Urine 06/16/2022 Negative  Negative Final   • Oxycodone Screen, Urine 06/16/2022 Negative  Negative Final   • Propoxyphene Screen 06/16/2022 Negative  Negative Final   • Buprenorphine, Screen, Urine 06/16/2022 Negative  Negative Final   • Ethanol, Urine 06/16/2022 Negative  Cutoff=0.020 % Final   • Methadone 06/16/2022 +POSITIVE+   Final   • Methadone, Quantitative 06/16/2022 4571  ng/mg creat Final   • EDDP 06/16/2022 6733  ng/mg creat Final   • Level of Detection: 06/16/2022 Comment   Final       12-Step attendance: Frequency/Sponsor?     Identification of environmental and personal barriers to recovery:     Motivation for treatment:     Mental Status Exam  Hygiene:  good  Dress:  casual  Attitude:  Cooperative  Motor Activity:  Restless  Speech:  Rapid  Mood:  anxious  Affect:  anxious  Thought Processes:  Flight of ieas  Thought Content:  normal  Suicidal Thoughts:  denies  Homicidal Thoughts:  denies  Crisis Safety Plan: yes, to come to the emergency room.  Hallucinations:  denies    Assessment     Diagnoses and all orders for this visit:    1. Methamphetamine use disorder, severe, dependence ([facility]) (Primary)    2. Opioid use disorder, severe, in early remission, on maintenance therapy, dependence ([facility])    3.  Generalized anxiety disorder               PLAN:   Patient will continue in bi-weekly group psychotherapy sessions and individual outpatient psychotherapy sessions every 3-4 weeks and will continue in self-help meetings and seek additional treatment if necessary following completion.    Carlee Voss LCSW

## 2022-06-27 NOTE — PROGRESS NOTES
Phase 2 IOP   6/13/2022  330-430      Data: Group members discussed their go to coping skills      RESPONSE: Group members discussed coping skills and reviewed, healthy vs. Unhealthy coping skills.     CLINICAL MANEUVERING/INTERVENTIONS: Assisted  member in processing above session content; acknowledged and normalized patient's thoughts, feelings and concerns.  Educated on Phase 2 IOP, relapse prevention and the importance of making goals in phase 2 that assist with individual and group Educated on the importance of having outside support/community support that is not family and being grateful for family support and not relying only on family for their recovery support, and the importance of having meetings, sponsorship and step work. Worked on coping skills during session.     Allowed patient to freely discuss issues without interruption or judgment. Provided safe, confidential environment to facilitate the development of positive therapeutic relationship and encourage open, honest communication. Assisted patient in identifying risk factors which would indicate the need for higher level of care including thoughts to harm self or others and/or self-harming behavior and encouraged patient to contact this office, call 911, or present to the nearest emergency room should any of these events occur. Discussed crisis intervention services and means to access.  Patient adamantly and convincingly denies current suicidal or homicidal ideation or perceptual disturbance.      ASSESSMENT:  Engaged in activity/Process and self-disclosed: Yes or No  Affect ([place] all that apply) appropriate, blunted, flat, sad, angry, tearful, anxious, bright  Applies topic to self: Yes or No  Able to give and receive feedback: Yes or No  Degree of insightful thinking: Least 1  2  3  4  5  6  7 8  9  10  Most    Urinalysis:   No visits with results within 1 Day(s) from this visit.   Latest known visit with results is:   Lab on 06/08/2022    Component Date Value Ref Range Status   • Methadone 06/08/2022 +POSITIVE+   Final   • Methadone, Quantitative 06/08/2022 6891  ng/mg creat Final   • EDDP 06/08/2022 5439  ng/mg creat Final   • Level of Detection: 06/08/2022 Comment   Final       12-Step attendance: Frequency/Sponsor?     Identification of environmental and personal barriers to recovery:     Motivation for treatment:     Mental Status Exam  Hygiene:  good  Dress:  casual  Attitude:  Cooperative  Motor Activity:  Restless  Speech:  Rapid  Mood:  anxious  Affect:  anxious  Thought Processes:  Flight of ieas  Thought Content:  normal  Suicidal Thoughts:  denies  Homicidal Thoughts:  denies  Crisis Safety Plan: yes, to come to the emergency room.  Hallucinations:  denies    Assessment     Diagnoses and all orders for this visit:    1. Methamphetamine use disorder, severe, dependence ([facility]) (Primary)    2. Opioid use disorder, severe, in early remission, on maintenance therapy, dependence ([facility]CC)    3. Generalized anxiety disorder               PLAN:   Patient will continue in bi-weekly group psychotherapy sessions and individual outpatient psychotherapy sessions every 3-4 weeks and will continue in self-help meetings and seek additional treatment if necessary following completion.    Carlee Caraballo

## 2022-06-28 NOTE — PROGRESS NOTES
Phase 2 IOP   6/22/2022  330-430      Data: Group members discussed what recovery looks like to them and their assignment. They are to send it to the office or bring it to the office when they come for testing.     RESPONSE: Group members discussed progress on their assignment about recovery and shared with other group members.     CLINICAL MANEUVERING/INTERVENTIONS: Assisted  member in processing above session content; acknowledged and normalized patient's thoughts, feelings and concerns.  Educated on Phase 2 IOP, relapse prevention and the importance of making goals in phase 2 that assist with individual and group Educated on the importance of having outside support/community support that is not family and being grateful for family support and not relying only on family for their recovery support, and the importance of having meetings, sponsorship and step work. Continue to work on recovery assignment and had a group open discussion about it to see the uniqueness of what each persons recovery means to them.     Allowed patient to freely discuss issues without interruption or judgment. Provided safe, confidential environment to facilitate the development of positive therapeutic relationship and encourage open, honest communication. Assisted patient in identifying risk factors which would indicate the need for higher level of care including thoughts to harm self or others and/or self-harming behavior and encouraged patient to contact this office, call 911, or present to the nearest emergency room should any of these events occur. Discussed crisis intervention services and means to access.  Patient adamantly and convincingly denies current suicidal or homicidal ideation or perceptual disturbance.    ASSESSMENT:  Engaged in activity/Process and self-disclosed: Yes or No  Affect ([place] all that apply) appropriate, blunted, flat, sad, angry, tearful, anxious, bright  Applies topic to self: Yes or No  Able to give and  receive feedback: Yes or No  Degree of insightful thinking: Least 1  2  3  4  5  6  7 8  9  10  Most    Urinalysis:   No visits with results within 1 Day(s) from this visit.   Latest known visit with results is:   Lab on 06/16/2022   Component Date Value Ref Range Status   • THC, Screen, Urine 06/16/2022 Negative  Negative Final   • Phencyclidine (PCP), Urine 06/16/2022 Negative  Negative Final   • Cocaine Screen, Urine 06/16/2022 Negative  Negative Final   • Methamphetamine, Ur 06/16/2022 Negative  Negative Final   • Opiate Screen 06/16/2022 Negative  Negative Final   • Amphetamine Screen, Urine 06/16/2022 Negative  Negative Final   • Benzodiazepine Screen, Urine 06/16/2022 Negative  Negative Final   • Tricyclic Antidepressants Screen 06/16/2022 Negative  Negative Final   • Methadone Screen, Urine 06/16/2022 Positive (A) Negative Final   • Barbiturates Screen, Urine 06/16/2022 Negative  Negative Final   • Oxycodone Screen, Urine 06/16/2022 Negative  Negative Final   • Propoxyphene Screen 06/16/2022 Negative  Negative Final   • Buprenorphine, Screen, Urine 06/16/2022 Negative  Negative Final   • Ethanol, Urine 06/16/2022 Negative  Cutoff=0.020 % Final   • Methadone 06/16/2022 +POSITIVE+   Final   • Methadone, Quantitative 06/16/2022 4571  ng/mg creat Final   • EDDP 06/16/2022 6733  ng/mg creat Final   • Level of Detection: 06/16/2022 Comment   Final       12-Step attendance: Frequency/Sponsor?     Identification of environmental and personal barriers to recovery:     Motivation for treatment:     Mental Status Exam  Hygiene:  good  Dress:  casual  Attitude:  Cooperative  Motor Activity:  Restless  Speech:  Rapid  Mood:  anxious  Affect:  anxious  Thought Processes:  Flight of ieas  Thought Content:  normal  Suicidal Thoughts:  denies  Homicidal Thoughts:  denies  Crisis Safety Plan: yes, to come to the emergency room.  Hallucinations:  denies    Assessment     Diagnoses and all orders for this visit:    1.  Methamphetamine use disorder, severe, dependence (HCC) (Primary)    2. Opioid use disorder, severe, in early remission, on maintenance therapy, dependence ([facility]CC)    3. Generalized anxiety disorder               PLAN:   Patient will continue in bi-weekly group psychotherapy sessions and individual outpatient psychotherapy sessions every 3-4 weeks and will continue in self-help meetings and seek additional treatment if necessary following completion.    Carlee Voss LCSW

## 2022-07-01 ENCOUNTER — LAB (OUTPATIENT)
Dept: LAB | Facility: HOSPITAL | Age: 37
End: 2022-07-01

## 2022-07-01 DIAGNOSIS — F11.20 OPIOID USE DISORDER, SEVERE, DEPENDENCE: ICD-10-CM

## 2022-07-01 DIAGNOSIS — F15.20 METHAMPHETAMINE USE DISORDER, SEVERE, DEPENDENCE: ICD-10-CM

## 2022-07-01 LAB
AMPHET+METHAMPHET UR QL: NEGATIVE
AMPHETAMINES UR QL: NEGATIVE
BARBITURATES UR QL SCN: NEGATIVE
BENZODIAZ UR QL SCN: NEGATIVE
BUPRENORPHINE SERPL-MCNC: NEGATIVE NG/ML
CANNABINOIDS SERPL QL: NEGATIVE
COCAINE UR QL: NEGATIVE
EDDP/CREAT UR: >8772 NG/MG CREAT
LEVEL OF DETECTION:: NORMAL
METHADONE UR QL CFM: NORMAL
METHADONE UR QL SCN: POSITIVE
METHADONE/CREAT UR: >8772 NG/MG CREAT
OPIATES UR QL: NEGATIVE
OXYCODONE UR QL SCN: NEGATIVE
PCP UR QL SCN: NEGATIVE
PROPOXYPH UR QL: NEGATIVE
TRICYCLICS UR QL SCN: NEGATIVE

## 2022-07-01 PROCEDURE — 80307 DRUG TEST PRSMV CHEM ANLYZR: CPT

## 2022-07-01 PROCEDURE — G0480 DRUG TEST DEF 1-7 CLASSES: HCPCS

## 2022-07-05 LAB — ETHANOL UR-MCNC: NEGATIVE %

## 2022-07-06 ENCOUNTER — TELEMEDICINE (OUTPATIENT)
Dept: PSYCHIATRY | Facility: CLINIC | Age: 37
End: 2022-07-06

## 2022-07-06 DIAGNOSIS — F41.1 GENERALIZED ANXIETY DISORDER: ICD-10-CM

## 2022-07-06 DIAGNOSIS — F11.21 OPIOID USE DISORDER, SEVERE, IN EARLY REMISSION, ON MAINTENANCE THERAPY, DEPENDENCE (HCC): ICD-10-CM

## 2022-07-06 DIAGNOSIS — F15.20 METHAMPHETAMINE USE DISORDER, SEVERE, DEPENDENCE: Primary | ICD-10-CM

## 2022-07-06 PROCEDURE — 90853 GROUP PSYCHOTHERAPY: CPT | Performed by: SOCIAL WORKER

## 2022-07-06 PROCEDURE — 90785 PSYTX COMPLEX INTERACTIVE: CPT | Performed by: SOCIAL WORKER

## 2022-07-07 ENCOUNTER — LAB (OUTPATIENT)
Dept: LAB | Facility: HOSPITAL | Age: 37
End: 2022-07-07

## 2022-07-07 DIAGNOSIS — F11.20 OPIOID USE DISORDER, SEVERE, DEPENDENCE: ICD-10-CM

## 2022-07-07 DIAGNOSIS — F15.20 METHAMPHETAMINE USE DISORDER, SEVERE, DEPENDENCE: ICD-10-CM

## 2022-07-07 PROCEDURE — G0480 DRUG TEST DEF 1-7 CLASSES: HCPCS

## 2022-07-07 PROCEDURE — 80307 DRUG TEST PRSMV CHEM ANLYZR: CPT

## 2022-07-08 LAB — ETHANOL UR-MCNC: NEGATIVE %

## 2022-07-10 LAB
EDDP/CREAT UR: >6944 NG/MG CREAT
LEVEL OF DETECTION:: NORMAL
METHADONE UR QL CFM: NORMAL
METHADONE/CREAT UR: >6944 NG/MG CREAT

## 2022-07-11 ENCOUNTER — TELEMEDICINE (OUTPATIENT)
Dept: PSYCHIATRY | Facility: CLINIC | Age: 37
End: 2022-07-11

## 2022-07-11 DIAGNOSIS — F11.21 OPIOID USE DISORDER, SEVERE, IN EARLY REMISSION, ON MAINTENANCE THERAPY, DEPENDENCE (HCC): ICD-10-CM

## 2022-07-11 DIAGNOSIS — F15.20 METHAMPHETAMINE USE DISORDER, SEVERE, DEPENDENCE: Primary | ICD-10-CM

## 2022-07-11 DIAGNOSIS — F41.1 GENERALIZED ANXIETY DISORDER: ICD-10-CM

## 2022-07-11 PROCEDURE — 90785 PSYTX COMPLEX INTERACTIVE: CPT | Performed by: SOCIAL WORKER

## 2022-07-11 PROCEDURE — 90853 GROUP PSYCHOTHERAPY: CPT | Performed by: SOCIAL WORKER

## 2022-07-12 ENCOUNTER — TELEPHONE (OUTPATIENT)
Dept: PSYCHIATRY | Facility: CLINIC | Age: 37
End: 2022-07-12

## 2022-07-12 DIAGNOSIS — F15.20 METHAMPHETAMINE USE DISORDER, SEVERE, DEPENDENCE: Primary | ICD-10-CM

## 2022-07-12 DIAGNOSIS — F11.20 OPIOID USE DISORDER, SEVERE, DEPENDENCE: ICD-10-CM

## 2022-07-12 NOTE — TELEPHONE ENCOUNTER
Due to billing issue the provider or medical assistant must order labs due to we do not have paper lab forms. I have cancelled old orders and put new orders in please cosign. Thanks

## 2022-07-13 ENCOUNTER — TELEMEDICINE (OUTPATIENT)
Dept: PSYCHIATRY | Facility: CLINIC | Age: 37
End: 2022-07-13

## 2022-07-13 DIAGNOSIS — F15.20 METHAMPHETAMINE USE DISORDER, SEVERE, DEPENDENCE: Primary | ICD-10-CM

## 2022-07-13 DIAGNOSIS — F11.21 OPIOID USE DISORDER, SEVERE, IN EARLY REMISSION, ON MAINTENANCE THERAPY, DEPENDENCE (HCC): ICD-10-CM

## 2022-07-13 DIAGNOSIS — F41.1 GENERALIZED ANXIETY DISORDER: ICD-10-CM

## 2022-07-13 LAB
EDDP/CREAT UR: 9345 NG/MG CREAT
LEVEL OF DETECTION:: NORMAL
METHADONE UR QL CFM: NORMAL
METHADONE/CREAT UR: 9674 NG/MG CREAT

## 2022-07-13 PROCEDURE — 90853 GROUP PSYCHOTHERAPY: CPT | Performed by: SOCIAL WORKER

## 2022-07-13 PROCEDURE — 90785 PSYTX COMPLEX INTERACTIVE: CPT | Performed by: SOCIAL WORKER

## 2022-07-13 NOTE — PROGRESS NOTES
7/6/2022  [id number]      Data: Group members discussed their recovery and what it means to them and to turn in their assignment by next week.     RESPONSE: Group members discussed  Some ideas of what recovery is to others to give them some examples for their assignment.     CLINICAL MANEUVERING/INTERVENTIONS: Assisted  member in processing above session content; acknowledged and normalized patient's thoughts, feelings and concerns.  Educated on Phase 2 IOP, relapse prevention and the importance of making goals in phase 2 that assist with individual and group Educated on the importance of having outside support/community support that is not family and being grateful for family support and not relying only on family for their recovery support, and the importance of having meetings, sponsorship and step work.  Continuing to work on assignment about recovery and how it is individualized for all and what it means tot them.     Allowed patient to freely discuss issues without interruption or judgment. Provided safe, confidential environment to facilitate the development of positive therapeutic relationship and encourage open, honest communication. Assisted patient in identifying risk factors which would indicate the need for higher level of care including thoughts to harm self or others and/or self-harming behavior and encouraged patient to contact this office, call 911, or present to the nearest emergency room should any of these events occur. Discussed crisis intervention services and means to access.  Patient adamantly and convincingly denies current suicidal or homicidal ideation or perceptual disturbance.      ASSESSMENT:  Engaged in activity/Process and self-disclosed: Yes or No  Affect ([place] all that apply) appropriate, blunted, flat, sad, angry, tearful, anxious, bright  Applies topic to self: Yes or No  Able to give and receive feedback: Yes or No  Degree of insightful thinking: Least 1  2  3  4  5  6  7 8  9  10   Most    Urinalysis:   No visits with results within 1 Day(s) from this visit.   Latest known visit with results is:   Lab on 07/07/2022   Component Date Value Ref Range Status   • THC, Screen, Urine 07/07/2022 Negative  Negative Final   • Phencyclidine (PCP), Urine 07/07/2022 Negative  Negative Final   • Cocaine Screen, Urine 07/07/2022 Negative  Negative Final   • Methamphetamine, Ur 07/07/2022 Negative  Negative Final   • Opiate Screen 07/07/2022 Negative  Negative Final   • Amphetamine Screen, Urine 07/07/2022 Negative  Negative Final   • Benzodiazepine Screen, Urine 07/07/2022 Negative  Negative Final   • Tricyclic Antidepressants Screen 07/07/2022 Negative  Negative Final   • Methadone Screen, Urine 07/07/2022 Positive (A) Negative Final   • Barbiturates Screen, Urine 07/07/2022 Negative  Negative Final   • Oxycodone Screen, Urine 07/07/2022 Negative  Negative Final   • Propoxyphene Screen 07/07/2022 Negative  Negative Final   • Buprenorphine, Screen, Urine 07/07/2022 Negative  Negative Final   • Ethanol, Urine 07/07/2022 Negative  Cutoff=0.020 % Final       12-Step attendance: Frequency/Sponsor?     Identification of environmental and personal barriers to recovery:     Motivation for treatment:     Mental Status Exam  Hygiene:  good  Dress:  casual  Attitude:  Cooperative  Motor Activity:  Restless  Speech:  Rapid  Mood:  anxious  Affect:  anxious  Thought Processes:  Flight of ieas  Thought Content:  normal  Suicidal Thoughts:  denies  Homicidal Thoughts:  denies  Crisis Safety Plan: yes, to come to the emergency room.  Hallucinations:  denies    Assessment     Diagnoses and all orders for this visit:    1. Methamphetamine use disorder, severe, dependence ([facility]) (Primary)    2. Opioid use disorder, severe, in early remission, on maintenance therapy, dependence ([facility])    3. Generalized anxiety disorder               PLAN:   Patient will continue in bi-weekly group psychotherapy sessions and individual  outpatient psychotherapy sessions every 3-4 weeks and will continue in self-help meetings and seek additional treatment if necessary following completion.    Carlee Voss LCSW

## 2022-07-13 NOTE — PROGRESS NOTES
Phase 2 IOP   7/6/2022  [id number]      Data: Group members discussed the assignment what recovery means to them and their daily life.    RESPONSE: Group members discussed  Some ideas of what recovery is to others to give them some examples for their assignment.     CLINICAL MANEUVERING/INTERVENTIONS: Assisted  member in processing above session content; acknowledged and normalized patient's thoughts, feelings and concerns.  Educated on Phase 2 IOP, relapse prevention and the importance of making goals in phase 2 that assist with individual and group Educated on the importance of having outside support/community support that is not family and being grateful for family support and not relying only on family for their recovery support, and the importance of having meetings, sponsorship and step work.  Assisted with the recovery assignment they are working on currently.     Allowed patient to freely discuss issues without interruption or judgment. Provided safe, confidential environment to facilitate the development of positive therapeutic relationship and encourage open, honest communication. Assisted patient in identifying risk factors which would indicate the need for higher level of care including thoughts to harm self or others and/or self-harming behavior and encouraged patient to contact this office, call 911, or present to the nearest emergency room should any of these events occur. Discussed crisis intervention services and means to access.  Patient adamantly and convincingly denies current suicidal or homicidal ideation or perceptual disturbance.      ASSESSMENT:  Engaged in activity/Process and self-disclosed: Yes or No  Affect ([place] all that apply) appropriate, blunted, flat, sad, angry, tearful, anxious, bright  Applies topic to self: Yes or No  Able to give and receive feedback: Yes or No  Degree of insightful thinking: Least 1  2  3  4  5  6  7 8  9  10  Most    Urinalysis:   No visits with results within  1 Day(s) from this visit.   Latest known visit with results is:   Lab on 07/01/2022   Component Date Value Ref Range Status   • THC, Screen, Urine 07/01/2022 Negative  Negative Final   • Phencyclidine (PCP), Urine 07/01/2022 Negative  Negative Final   • Cocaine Screen, Urine 07/01/2022 Negative  Negative Final   • Methamphetamine, Ur 07/01/2022 Negative  Negative Final   • Opiate Screen 07/01/2022 Negative  Negative Final   • Amphetamine Screen, Urine 07/01/2022 Negative  Negative Final   • Benzodiazepine Screen, Urine 07/01/2022 Negative  Negative Final   • Tricyclic Antidepressants Screen 07/01/2022 Negative  Negative Final   • Methadone Screen, Urine 07/01/2022 Positive (A) Negative Final   • Barbiturates Screen, Urine 07/01/2022 Negative  Negative Final   • Oxycodone Screen, Urine 07/01/2022 Negative  Negative Final   • Propoxyphene Screen 07/01/2022 Negative  Negative Final   • Buprenorphine, Screen, Urine 07/01/2022 Negative  Negative Final   • Ethanol, Urine 07/01/2022 Negative  Cutoff=0.020 % Final   • Methadone 07/01/2022 +POSITIVE+   Final   • Methadone, Quantitative 07/01/2022 >6944  ng/mg creat Final   • EDDP 07/01/2022 >6944  ng/mg creat Final   • Level of Detection: 07/01/2022 Comment   Final       12-Step attendance: Frequency/Sponsor?     Identification of environmental and personal barriers to recovery:     Motivation for treatment:     Mental Status Exam  Hygiene:  good  Dress:  casual  Attitude:  Cooperative  Motor Activity:  Restless  Speech:  Rapid  Mood:  anxious  Affect:  anxious  Thought Processes:  Flight of ieas  Thought Content:  normal  Suicidal Thoughts:  denies  Homicidal Thoughts:  denies  Crisis Safety Plan: yes, to come to the emergency room.  Hallucinations:  denies    Assessment     Diagnoses and all orders for this visit:    1. Methamphetamine use disorder, severe, dependence ([facility]CC) (Primary)    2. Opioid use disorder, severe, in early remission, on maintenance therapy, dependence  ([facility])    3. Generalized anxiety disorder               PLAN:   Patient will continue in bi-weekly group psychotherapy sessions and individual outpatient psychotherapy sessions every 3-4 weeks and will continue in self-help meetings and seek additional treatment if necessary following completion.    Carlee Voss LCSW

## 2022-07-14 ENCOUNTER — LAB (OUTPATIENT)
Dept: LAB | Facility: HOSPITAL | Age: 37
End: 2022-07-14

## 2022-07-14 DIAGNOSIS — F15.20 METHAMPHETAMINE USE DISORDER, SEVERE, DEPENDENCE: ICD-10-CM

## 2022-07-14 DIAGNOSIS — F11.20 OPIOID USE DISORDER, SEVERE, DEPENDENCE: ICD-10-CM

## 2022-07-14 PROCEDURE — 80307 DRUG TEST PRSMV CHEM ANLYZR: CPT

## 2022-07-14 PROCEDURE — G0480 DRUG TEST DEF 1-7 CLASSES: HCPCS

## 2022-07-15 LAB — ETHANOL UR-MCNC: NEGATIVE %

## 2022-07-19 LAB
EDDP/CREAT UR: >8403 NG/MG CREAT
LEVEL OF DETECTION:: NORMAL
METHADONE UR QL CFM: NORMAL
METHADONE/CREAT UR: >8403 NG/MG CREAT

## 2022-07-22 ENCOUNTER — LAB (OUTPATIENT)
Dept: LAB | Facility: HOSPITAL | Age: 37
End: 2022-07-22

## 2022-07-22 DIAGNOSIS — F11.20 OPIOID USE DISORDER, SEVERE, DEPENDENCE: ICD-10-CM

## 2022-07-22 DIAGNOSIS — F15.20 METHAMPHETAMINE USE DISORDER, SEVERE, DEPENDENCE: ICD-10-CM

## 2022-07-22 PROCEDURE — 80307 DRUG TEST PRSMV CHEM ANLYZR: CPT

## 2022-07-22 PROCEDURE — G0480 DRUG TEST DEF 1-7 CLASSES: HCPCS

## 2022-07-23 LAB — ETHANOL UR-MCNC: NEGATIVE %

## 2022-07-25 NOTE — PROGRESS NOTES
7/13/2022  [id number]      Data: Group members had a guest Crystal and was educated on the 12 steps    RESPONSE: Group members discussed their experience with the 12 steps,     CLINICAL MANEUVERING/INTERVENTIONS: Assisted  member in processing above session content; acknowledged and normalized patient's thoughts, feelings and concerns.  Educated on Phase 2 IOP, relapse prevention and the importance of making goals in phase 2 that assist with individual and group Educated on the importance of having outside support/community support that is not family and being grateful for family support and not relying only on family for their recovery support, and the importance of having meetings, sponsorship and step work.  Educated on the 12 steps.     Allowed patient to freely discuss issues without interruption or judgment. Provided safe, confidential environment to facilitate the development of positive therapeutic relationship and encourage open, honest communication. Assisted patient in identifying risk factors which would indicate the need for higher level of care including thoughts to harm self or others and/or self-harming behavior and encouraged patient to contact this office, call 911, or present to the nearest emergency room should any of these events occur. Discussed crisis intervention services and means to access.  Patient adamantly and convincingly denies current suicidal or homicidal ideation or perceptual disturbance.        ASSESSMENT:  Engaged in activity/Process and self-disclosed: Yes or No  Affect ([place] all that apply) appropriate, blunted, flat, sad, angry, tearful, anxious, bright  Applies topic to self: Yes or No  Able to give and receive feedback: Yes or No  Degree of insightful thinking: Least 1  2  3  4  5  6  7 8  9  10  Most    Urinalysis:   No visits with results within 1 Day(s) from this visit.   Latest known visit with results is:   Lab on 07/07/2022   Component Date Value Ref Range Status   •  THC, Screen, Urine 07/07/2022 Negative  Negative Final   • Phencyclidine (PCP), Urine 07/07/2022 Negative  Negative Final   • Cocaine Screen, Urine 07/07/2022 Negative  Negative Final   • Methamphetamine, Ur 07/07/2022 Negative  Negative Final   • Opiate Screen 07/07/2022 Negative  Negative Final   • Amphetamine Screen, Urine 07/07/2022 Negative  Negative Final   • Benzodiazepine Screen, Urine 07/07/2022 Negative  Negative Final   • Tricyclic Antidepressants Screen 07/07/2022 Negative  Negative Final   • Methadone Screen, Urine 07/07/2022 Positive (A) Negative Final   • Barbiturates Screen, Urine 07/07/2022 Negative  Negative Final   • Oxycodone Screen, Urine 07/07/2022 Negative  Negative Final   • Propoxyphene Screen 07/07/2022 Negative  Negative Final   • Buprenorphine, Screen, Urine 07/07/2022 Negative  Negative Final   • Ethanol, Urine 07/07/2022 Negative  Cutoff=0.020 % Final   • Methadone 07/07/2022 +POSITIVE+   Final   • Methadone, Quantitative 07/07/2022 9674  ng/mg creat Final   • EDDP 07/07/2022 9345  ng/mg creat Final   • Level of Detection: 07/07/2022 Comment   Final       12-Step attendance: Frequency/Sponsor?     Identification of environmental and personal barriers to recovery:     Motivation for treatment:     Mental Status Exam  Hygiene:  good  Dress:  casual  Attitude:  Cooperative  Motor Activity:  Restless  Speech:  Rapid  Mood:  anxious  Affect:  anxious  Thought Processes:  Flight of ieas  Thought Content:  normal  Suicidal Thoughts:  denies  Homicidal Thoughts:  denies  Crisis Safety Plan: yes, to come to the emergency room.  Hallucinations:  denies    Assessment     Diagnoses and all orders for this visit:    1. Methamphetamine use disorder, severe, dependence (HCC) (Primary)    2. Opioid use disorder, severe, in early remission, on maintenance therapy, dependence (HCC)    3. Generalized anxiety disorder               PLAN:   Patient will continue in bi-weekly group psychotherapy sessions and  individual outpatient psychotherapy sessions every 3-4 weeks and will continue in self-help meetings and seek additional treatment if necessary following completion.    Carlee Voss LCSW

## 2022-07-27 ENCOUNTER — TELEPHONE (OUTPATIENT)
Dept: PSYCHIATRY | Facility: CLINIC | Age: 37
End: 2022-07-27

## 2022-07-27 LAB
EDDP/CREAT UR: >7752 NG/MG CREAT
LEVEL OF DETECTION:: NORMAL
METHADONE UR QL CFM: NORMAL
METHADONE/CREAT UR: 2749 NG/MG CREAT

## 2022-07-29 ENCOUNTER — LAB (OUTPATIENT)
Dept: LAB | Facility: HOSPITAL | Age: 37
End: 2022-07-29

## 2022-07-29 DIAGNOSIS — F15.20 METHAMPHETAMINE USE DISORDER, SEVERE, DEPENDENCE: ICD-10-CM

## 2022-07-29 DIAGNOSIS — F11.20 OPIOID USE DISORDER, SEVERE, DEPENDENCE: ICD-10-CM

## 2022-07-29 PROCEDURE — 80307 DRUG TEST PRSMV CHEM ANLYZR: CPT

## 2022-07-29 PROCEDURE — G0480 DRUG TEST DEF 1-7 CLASSES: HCPCS

## 2022-08-01 ENCOUNTER — TELEMEDICINE (OUTPATIENT)
Dept: PSYCHIATRY | Facility: CLINIC | Age: 37
End: 2022-08-01

## 2022-08-01 NOTE — PROGRESS NOTES
CD IOP GROUP     Date: 08/01/2022  Name: Ada Vogt    Time In: 1530   Time Out: 1630    This provider is located at [facility] located at [address] [place] [address], [address] [place] [id number].  The Patient is seen remotely at his/her home, using Zoom. Patient is being seen via telehealth and confirm that they are in a secure environment for this session. The patient's condition being diagnosed/treated is appropriate for telemedicine. The provider identified himself as well as his credentials. The patient gave consent to be seen remotely, and when consent is given they understand that the consent allows for patient identifiable information to be sent to a third party as needed. They may refuse to be seen remotely at any time. The electronic data is encrypted and password protected, and the patient has been advised of the potential risks to privacy not withstanding such measures.      Number of participants: 8    IOP GROUP NOTE     Data: 3 hour IOP group therapy session (Check-ins, Coping Skills, Relapse Prevention)     Check Ins: Therapist continued facilitation of rapport building strategies between group members. Therapist asked that each patient check in with home life and recovery efforts and identify triggers, cravings, and high risk situations that arise between group sessions. Therapist provided empathy and support during group session.     Session Content/Coping Skills: Check ins completed by group members. Introductions completed. Clinician began discussion of enabling behaviors with group members.     Response: Patient attended class via Zoom. Patient participated in verbal completion of check in form.   Patient during check in denied suicidal or homicidal thoughts.   Patient participated in group discussions during session.   Patient discussed that she got a new apartment. Patient reported she had attended two meetings since last group.     Personal Assessment 0-10 Scale (10 worst)    Anxiety:   3   Depression:  3   Cravings: 0     Assessment:     ..  Lab on 07/29/2022   Component Date Value Ref Range Status   • THC, Screen, Urine 07/29/2022 Negative  Negative Final   • Phencyclidine (PCP), Urine 07/29/2022 Negative  Negative Final   • Cocaine Screen, Urine 07/29/2022 Negative  Negative Final   • Methamphetamine, Ur 07/29/2022 Negative  Negative Final   • Opiate Screen 07/29/2022 Negative  Negative Final   • Amphetamine Screen, Urine 07/29/2022 Negative  Negative Final   • Benzodiazepine Screen, Urine 07/29/2022 Negative  Negative Final   • Tricyclic Antidepressants Screen 07/29/2022 Negative  Negative Final   • Methadone Screen, Urine 07/29/2022 Positive (A) Negative Final   • Barbiturates Screen, Urine 07/29/2022 Negative  Negative Final   • Oxycodone Screen, Urine 07/29/2022 Negative  Negative Final   • Propoxyphene Screen 07/29/2022 Negative  Negative Final   • Buprenorphine, Screen, Urine 07/29/2022 Negative  Negative Final   Lab on 07/22/2022   Component Date Value Ref Range Status   • Ethanol, Urine 07/22/2022 Negative  Cutoff=0.020 % Final   • THC, Screen, Urine 07/22/2022 Negative  Negative Final   • Phencyclidine (PCP), Urine 07/22/2022 Negative  Negative Final   • Cocaine Screen, Urine 07/22/2022 Negative  Negative Final   • Methamphetamine, Ur 07/22/2022 Negative  Negative Final   • Opiate Screen 07/22/2022 Negative  Negative Final   • Amphetamine Screen, Urine 07/22/2022 Negative  Negative Final   • Benzodiazepine Screen, Urine 07/22/2022 Negative  Negative Final   • Tricyclic Antidepressants Screen 07/22/2022 Negative  Negative Final   • Methadone Screen, Urine 07/22/2022 Positive (A) Negative Final   • Barbiturates Screen, Urine 07/22/2022 Negative  Negative Final   • Oxycodone Screen, Urine 07/22/2022 Negative  Negative Final   • Propoxyphene Screen 07/22/2022 Negative  Negative Final   • Buprenorphine, Screen, Urine 07/22/2022 Negative  Negative Final   • Methadone 07/22/2022 +POSITIVE+    Final   • Methadone, Quantitative 07/22/2022 2749  ng/mg creat Final   • EDDP 07/22/2022 >7752  ng/mg creat Final   • Level of Detection: 07/22/2022 Comment   Final    Testing Threshold:  50 ng/mL                                                                       '  This test was developed and its performance characteristics  determined by [facility].  It has not been cleared or approved  by the Food and Drug Administration.   Lab on 07/14/2022   Component Date Value Ref Range Status   • Ethanol, Urine 07/14/2022 Negative  Cutoff=0.020 % Final   • THC, Screen, Urine 07/14/2022 Negative  Negative Final   • Phencyclidine (PCP), Urine 07/14/2022 Negative  Negative Final   • Cocaine Screen, Urine 07/14/2022 Negative  Negative Final   • Methamphetamine, Ur 07/14/2022 Negative  Negative Final   • Opiate Screen 07/14/2022 Negative  Negative Final   • Amphetamine Screen, Urine 07/14/2022 Negative  Negative Final   • Benzodiazepine Screen, Urine 07/14/2022 Negative  Negative Final   • Tricyclic Antidepressants Screen 07/14/2022 Negative  Negative Final   • Methadone Screen, Urine 07/14/2022 Positive (A) Negative Final   • Barbiturates Screen, Urine 07/14/2022 Negative  Negative Final   • Oxycodone Screen, Urine 07/14/2022 Negative  Negative Final   • Propoxyphene Screen 07/14/2022 Negative  Negative Final   • Buprenorphine, Screen, Urine 07/14/2022 Negative  Negative Final   • Methadone 07/14/2022 +POSITIVE+   Final   • Methadone, Quantitative 07/14/2022 >8403  ng/mg creat Final   • EDDP 07/14/2022 >8403  ng/mg creat Final   • Level of Detection: 07/14/2022 Comment   Final    Testing Threshold:  50 ng/mL                                                                       '  This test was developed and its performance characteristics  determined by [facility].  It has not been cleared or approved  by the Food and Drug Administration.       Mental Status Exam  Hygiene:  good  Dress: casual  Attitude: cooperative and agreeable  "  Motor Activity: appropriate  Eye Contact:  good  Speech: regular rate and rhythm   Mood:  calm and cooperative  Affect:  Appropriate  Thought Processes:  Linear  Thought Content:  Normal  Suicidal Thoughts:  denies  Homicidal Thoughts:  denies  Crisis Safety Plan: Safety plan has been discussed.   Hallucinations: Unknown to clinician.   Reliability: fair  Insight: fair  Judgement: fair  Impulse Control: fair    Recovery/spiritual support group attendance: Yes, Patient reported she had attended a CR meeting and a meeting in [place].      Progress toward goal: Not at goal    Prognosis: Fair with Ongoing Treatment     Self-reported number of days sober:  Patient reported \"around 127 days\" during check in.     Patient agreeable to adhere to medication regimen as prescribed and report any side effects. Patient will contact this office, call 911 or present to the nearest emergency room should suicidal or homicidal ideations occur.    Impression/Formulation:  No diagnosis found.    Clinical Maneuvering/Interventions: Therapist utilized a person-centered approach to build rapport with group member. Therapist implemented motivational interviewing techniques to assist client with exploring and resolving ambivalence associated with commitment to change behaviors related to substance use and addiction. Therapist applied cognitive behavioral strategies to facilitate identification of maladaptive patterns of thinking and behavior that contribute to client's risk for continued substance use and relapse. Therapist employed group interaction activities to build rapport among group members, promote sobriety, and emphasize relapse prevention. Therapist promoted safe nonjudgmental environment by providing group members with unconditional positive regard and encouraging group members to comply with group rules and guidelines. Therapist assisted group member with identifying and implementing healthier coping strategies.      Plan:  " Continue [facility] IOP Phase I   Aftercare:  [facility] Phase II  Program Assignments:  Personal recovery plan, relapse prevention plan, attendance of recovery support group meetings, exploration of sponsorship, drug/alcohol screens.     Amor Tabares LCSW  8/1/2022  16:57 EDT

## 2022-08-03 ENCOUNTER — LAB (OUTPATIENT)
Dept: LAB | Facility: HOSPITAL | Age: 37
End: 2022-08-03

## 2022-08-03 DIAGNOSIS — F11.20 OPIOID USE DISORDER, SEVERE, DEPENDENCE: ICD-10-CM

## 2022-08-03 DIAGNOSIS — F15.20 METHAMPHETAMINE USE DISORDER, SEVERE, DEPENDENCE: ICD-10-CM

## 2022-08-03 PROCEDURE — G0480 DRUG TEST DEF 1-7 CLASSES: HCPCS

## 2022-08-03 PROCEDURE — 80307 DRUG TEST PRSMV CHEM ANLYZR: CPT

## 2022-08-04 ENCOUNTER — TELEMEDICINE (OUTPATIENT)
Dept: PSYCHIATRY | Facility: CLINIC | Age: 37
End: 2022-08-04

## 2022-08-04 LAB
EDDP/CREAT UR: NORMAL NG/MG CREAT
LEVEL OF DETECTION:: NORMAL
METHADONE UR QL CFM: NORMAL
METHADONE/CREAT UR: NORMAL NG/MG CREAT

## 2022-08-05 LAB
ETHANOL UR-MCNC: NEGATIVE %
ETHANOL UR-MCNC: NEGATIVE %

## 2022-08-08 ENCOUNTER — TELEMEDICINE (OUTPATIENT)
Dept: PSYCHIATRY | Facility: CLINIC | Age: 37
End: 2022-08-08

## 2022-08-08 NOTE — PROGRESS NOTES
"CD IOP GROUP     Date: 08/04/2022  Name: Ada Vogt    Time In: 1530   Time Out: 1630    Number of participants: 8    This provider is located at [facility] located at [address] [place] [address], [address] [place] [id number].  The Patient is seen remotely at his/her home, using Zoom. Patient is being seen via telehealth and confirm that they are in a secure environment for this session. The patient's condition being diagnosed/treated is appropriate for telemedicine. The provider identified himself as well as his credentials. The patient gave consent to be seen remotely, and when consent is given they understand that the consent allows for patient identifiable information to be sent to a third party as needed. They may refuse to be seen remotely at any time. The electronic data is encrypted and password protected, and the patient has been advised of the potential risks to privacy not withstanding such measures.    IOP GROUP NOTE     Data: 1 hour IOP group therapy session (Check-ins, Coping Skills, Relapse Prevention)     Check Ins: Therapist continued facilitation of rapport building strategies between group members. Therapist asked that each patient check in with home life and recovery efforts and identify triggers, cravings, and high risk situations that arise between group sessions. Therapist provided empathy and support during group session.     Session Content/Coping Skills: Check ins completed by group members. Clinician explained check in form to group members. Clinician discussed making progress in recovery. Clinician discussed with group members focusing on what they can control. Clinician processed stressors with group members.      Response: Patient attended class via Zoom. Patient participated in verbal completion of check in form. Patient during check in answered no to question \"currently or since last group meeting have you had any suicidal thoughts or plan or intent to hurt yourself”, and " "patient also answered no to question of \"currently or since your last group meeting, have you had any homicidal thoughts or plan or intent to hurt others\". Patient answered no to all other questions during check in. Patient participated in group discussions during session. Patient shared recent stressor with group.     Personal Assessment 0-10 Scale (10 worst)    Anxiety:  9   Depression:  2   Cravings: 3     Assessment:     ..  Lab on 08/03/2022   Component Date Value Ref Range Status   • Ethanol, Urine 08/03/2022 Negative  Cutoff=0.020 % Final   • THC, Screen, Urine 08/03/2022 Negative  Negative Final   • Phencyclidine (PCP), Urine 08/03/2022 Negative  Negative Final   • Cocaine Screen, Urine 08/03/2022 Negative  Negative Final   • Methamphetamine, Ur 08/03/2022 Negative  Negative Final   • Opiate Screen 08/03/2022 Negative  Negative Final   • Amphetamine Screen, Urine 08/03/2022 Negative  Negative Final   • Benzodiazepine Screen, Urine 08/03/2022 Negative  Negative Final   • Tricyclic Antidepressants Screen 08/03/2022 Negative  Negative Final   • Methadone Screen, Urine 08/03/2022 Positive (A) Negative Final   • Barbiturates Screen, Urine 08/03/2022 Negative  Negative Final   • Oxycodone Screen, Urine 08/03/2022 Negative  Negative Final   • Propoxyphene Screen 08/03/2022 Negative  Negative Final   • Buprenorphine, Screen, Urine 08/03/2022 Negative  Negative Final   Lab on 07/29/2022   Component Date Value Ref Range Status   • Ethanol, Urine 07/29/2022 Negative  Cutoff=0.020 % Final   • THC, Screen, Urine 07/29/2022 Negative  Negative Final   • Phencyclidine (PCP), Urine 07/29/2022 Negative  Negative Final   • Cocaine Screen, Urine 07/29/2022 Negative  Negative Final   • Methamphetamine, Ur 07/29/2022 Negative  Negative Final   • Opiate Screen 07/29/2022 Negative  Negative Final   • Amphetamine Screen, Urine 07/29/2022 Negative  Negative Final   • Benzodiazepine Screen, Urine 07/29/2022 Negative  Negative Final "   • Tricyclic Antidepressants Screen 07/29/2022 Negative  Negative Final   • Methadone Screen, Urine 07/29/2022 Positive (A) Negative Final   • Barbiturates Screen, Urine 07/29/2022 Negative  Negative Final   • Oxycodone Screen, Urine 07/29/2022 Negative  Negative Final   • Propoxyphene Screen 07/29/2022 Negative  Negative Final   • Buprenorphine, Screen, Urine 07/29/2022 Negative  Negative Final   • Methadone 07/29/2022 +POSITIVE+   Final   • Methadone, Quantitative 07/29/2022 >32639  ng/mg creat Final   • EDDP 07/29/2022 >08729  ng/mg creat Final   • Level of Detection: 07/29/2022 Comment   Final    Testing Threshold:  50 ng/mL                                                                       '  This test was developed and its performance characteristics  determined by [facility].  It has not been cleared or approved  by the Food and Drug Administration.   Lab on 07/22/2022   Component Date Value Ref Range Status   • Ethanol, Urine 07/22/2022 Negative  Cutoff=0.020 % Final   • THC, Screen, Urine 07/22/2022 Negative  Negative Final   • Phencyclidine (PCP), Urine 07/22/2022 Negative  Negative Final   • Cocaine Screen, Urine 07/22/2022 Negative  Negative Final   • Methamphetamine, Ur 07/22/2022 Negative  Negative Final   • Opiate Screen 07/22/2022 Negative  Negative Final   • Amphetamine Screen, Urine 07/22/2022 Negative  Negative Final   • Benzodiazepine Screen, Urine 07/22/2022 Negative  Negative Final   • Tricyclic Antidepressants Screen 07/22/2022 Negative  Negative Final   • Methadone Screen, Urine 07/22/2022 Positive (A) Negative Final   • Barbiturates Screen, Urine 07/22/2022 Negative  Negative Final   • Oxycodone Screen, Urine 07/22/2022 Negative  Negative Final   • Propoxyphene Screen 07/22/2022 Negative  Negative Final   • Buprenorphine, Screen, Urine 07/22/2022 Negative  Negative Final   • Methadone 07/22/2022 +POSITIVE+   Final   • Methadone, Quantitative 07/22/2022 2749  ng/mg creat Final   • EDDP  07/22/2022 >7752  ng/mg creat Final   • Level of Detection: 07/22/2022 Comment   Final    Testing Threshold:  50 ng/mL                                                                       '  This test was developed and its performance characteristics  determined by [employer] [facility].  It has not been cleared or approved  by the Food and Drug Administration.       Mental Status Exam  Hygiene:  good  Dress: casual  Attitude: cooperative and agreeable   Motor Activity: appropriate  Eye Contact:  good  Speech: regular rate and rhythm   Mood:  calm and cooperative  Affect:  Appropriate  Thought Processes:  Linear  Thought Content:  Normal  Suicidal Thoughts:  denies  Homicidal Thoughts:  denies  Crisis Safety Plan: Safety plan has been discussed.   Hallucinations: Unknown to clinician.   Reliability: fair  Insight: fair  Judgement: fair  Impulse Control: fair    Progress toward goal: Not at goal    Prognosis: Fair with Ongoing Treatment     Self-reported number of days sober: Patient reported 132 days on check in form.     Patient agreeable to adhere to medication regimen as prescribed and report any side effects. Patient will contact this office, call 911 or present to the nearest emergency room should suicidal or homicidal ideations occur.    Impression/Formulation:  No diagnosis found.    Clinical Maneuvering/Interventions: Therapist utilized a person-centered approach to build rapport with group member. Therapist implemented motivational interviewing techniques to assist client with exploring and resolving ambivalence associated with commitment to change behaviors related to substance use and addiction. Therapist applied cognitive behavioral strategies to facilitate identification of maladaptive patterns of thinking and behavior that contribute to client's risk for continued substance use and relapse. Therapist employed group interaction activities to build rapport among group members, promote sobriety, and emphasize relapse  prevention. Therapist promoted safe nonjudgmental environment by providing group members with unconditional positive regard and encouraging group members to comply with group rules and guidelines. Therapist assisted group member with identifying and implementing healthier coping strategies.      Plan:  Continue [facility] IOP Phase I   Aftercare:  [facility] Phase II  Program Assignments:  Personal recovery plan, relapse prevention plan, attendance of recovery support group meetings, exploration of sponsorship, drug/alcohol screens.     Amor Tabares LCSW  8/8/2022  11:43 EDT

## 2022-08-10 ENCOUNTER — LAB (OUTPATIENT)
Dept: LAB | Facility: HOSPITAL | Age: 37
End: 2022-08-10

## 2022-08-10 ENCOUNTER — OFFICE VISIT (OUTPATIENT)
Dept: PSYCHIATRY | Facility: CLINIC | Age: 37
End: 2022-08-10

## 2022-08-10 VITALS
SYSTOLIC BLOOD PRESSURE: 98 MMHG | HEIGHT: 61 IN | BODY MASS INDEX: 17.37 KG/M2 | DIASTOLIC BLOOD PRESSURE: 62 MMHG | HEART RATE: 78 BPM | WEIGHT: 92 LBS

## 2022-08-10 DIAGNOSIS — F11.20 OPIOID USE DISORDER, SEVERE, DEPENDENCE: ICD-10-CM

## 2022-08-10 DIAGNOSIS — R53.83 FATIGUE, UNSPECIFIED TYPE: Primary | ICD-10-CM

## 2022-08-10 DIAGNOSIS — T40.2X5A CONSTIPATION DUE TO OPIOID THERAPY: ICD-10-CM

## 2022-08-10 DIAGNOSIS — F41.1 GENERALIZED ANXIETY DISORDER: ICD-10-CM

## 2022-08-10 DIAGNOSIS — F15.20 METHAMPHETAMINE USE DISORDER, SEVERE, DEPENDENCE: ICD-10-CM

## 2022-08-10 DIAGNOSIS — K59.03 CONSTIPATION DUE TO OPIOID THERAPY: ICD-10-CM

## 2022-08-10 PROCEDURE — 99214 OFFICE O/P EST MOD 30 MIN: CPT | Performed by: NURSE PRACTITIONER

## 2022-08-10 PROCEDURE — G0480 DRUG TEST DEF 1-7 CLASSES: HCPCS

## 2022-08-10 PROCEDURE — 80307 DRUG TEST PRSMV CHEM ANLYZR: CPT

## 2022-08-10 RX ORDER — DOCUSATE SODIUM 100 MG/1
100 CAPSULE, LIQUID FILLED ORAL 2 TIMES DAILY PRN
Qty: 60 CAPSULE | Refills: 2 | Status: SHIPPED | OUTPATIENT
Start: 2022-08-10 | End: 2022-12-14 | Stop reason: SDUPTHER

## 2022-08-10 RX ORDER — BUPROPION HYDROCHLORIDE 100 MG/1
100 TABLET ORAL 2 TIMES DAILY
Qty: 30 TABLET | Refills: 2 | Status: SHIPPED | OUTPATIENT
Start: 2022-08-10 | End: 2022-12-14

## 2022-08-10 NOTE — PROGRESS NOTES
Office  Follow Up Visit      Patient Name: Ada Vogt  : 1985   MRN: [id number]     Referring Provider: Provider, No Known    Chief Complaint: Substance use    History of Present Illness:   Ada Vogt is a 37 y.o. female who is here today for follow up related to mood.  Currently taking Wellbutrin 100 mg twice daily and is tolerating medication well.  Continues to report excessive worry, panic attacks, feelings of guilt/worthlessness have improved with use.  Is happy to report she has regained custody of her 2-year-old daughter.  Will be moving out of sober living next week into their own apartment. Housing was her last step in case plan. Upcoming court date 2022 and case will likely be closed out. Has had increase in fatigue despite sleeping well most nights. 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 Continues in IOP. Taking methadone 50mg daily from [facility] and denies any recurrence of illicit substance use. Opioid induced constipation improved with docusate use. No other complaints today.     Triggers: stress, anxiety, financial issues    Cravings: none recent    Relapse Prevention: Peer support, IOP, MOUD    Urine Drug Screen (today's visit) discussed: N/A    UDS Confirmation: 8/3 +methadone as expected    RITO (PDMP) Reviewed for Current/Active Medications: No active medications    History:  Substance use initially started at age 20 with social EtOH intake.  Added THC intermittently around age 25.  Started using prescription pain pills recreationally at age 25 as well as methamphetamine.  Both of these were used daily up until 3/24/2022. Started on methadone at that time. Was using meth to cope with opiate cravings. Has been in IOP in the past but did not like format and felt it was not helpful.  No other reported rehab, detox in the past.      Past Surgical History:  Past Surgical History:   Procedure Laterality Date   •  SECTION         Problem List:  There is  no problem list on file for this patient.      Allergy:   Allergies   Allergen Reactions   • Amoxicillin Hives   • Penicillins Hives        Current Medications:   Current Outpatient Medications   Medication Sig Dispense Refill   • buPROPion (WELLBUTRIN) 100 MG tablet Take 1 tablet by mouth 2 (Two) Times a Day. 30 tablet 2   • docusate sodium (Colace) 100 MG capsule Take 1 capsule by mouth 2 (Two) Times a Day As Needed for Constipation. 60 capsule 2   • methadone (DOLOPHINE) 10 MG tablet Take 50 mg by mouth Daily,     • Nexplanon 68 MG implant subdermal implant        No current facility-administered medications for this visit.       Past Medical History:  Past Medical History:   Diagnosis Date   • Anxiety    • Depression    • Panic disorder        Social History:  Social History     Socioeconomic History   • Marital status: Single   Tobacco Use   • Smoking status: Current Every Day Smoker     Packs/day: 1.00     Types: Cigarettes   • Smokeless tobacco: Never Used   Vaping Use   • Vaping Use: Some days   • Substances: Nicotine, Flavoring   • Devices: Disposable   Substance and Sexual Activity   • Alcohol use: Not Currently   • Drug use: Not Currently     Types: Methamphetamines, Codeine     Comment: States she has not used in 3 weeks   • Sexual activity: Defer       Family History:  Family History   Problem Relation Age of Onset   • Alcohol abuse Father    • Drug abuse Brother    • Alcohol abuse Brother          Subjective      Review of Systems:   Review of Systems   Constitutional: Positive for fatigue. Negative for chills and fever.   Respiratory: Negative for shortness of breath.    Cardiovascular: Negative for chest pain.   Gastrointestinal: Positive for constipation. Negative for abdominal pain.   Skin: Negative for skin lesions.   Neurological: Negative for seizures and confusion.   Psychiatric/Behavioral: Positive for stress. Negative for hallucinations, sleep disturbance, suicidal ideas and depressed mood.  The patient is nervous/anxious.        PHQ-9 Total Score: 9    KATHY-7 Score:   Feeling nervous, anxious or on edge: Several days  Not being able to stop or control worrying: More than half the days  Worrying too much about different things: More than half the days  Trouble Relaxing: Several days  Being so restless that it is hard to sit still: Not at all  Feeling afraid as if something awful might happen: Several days  Becoming easily annoyed or irritable: Several days  KATHY 7 Total Score: 8  If you checked any problems, how difficult have these problems made it for you to do your work, take care of things at home, or get along with other people: Somewhat difficult    Patient History:   The following portions of the patient's history were reviewed and updated as appropriate: allergies, current medications, past family history, past medical history, past social history, past surgical history and problem list.     Social:  Social History     Socioeconomic History   • Marital status: Single   Tobacco Use   • Smoking status: Current Every Day Smoker     Packs/day: 1.00     Types: Cigarettes   • Smokeless tobacco: Never Used   Vaping Use   • Vaping Use: Some days   • Substances: Nicotine, Flavoring   • Devices: Disposable   Substance and Sexual Activity   • Alcohol use: Not Currently   • Drug use: Not Currently     Types: Methamphetamines, Codeine     Comment: States she has not used in 3 weeks   • Sexual activity: Defer       Medications:     Current Outpatient Medications:   •  buPROPion (WELLBUTRIN) 100 MG tablet, Take 1 tablet by mouth 2 (Two) Times a Day., Disp: 30 tablet, Rfl: 2  •  docusate sodium (Colace) 100 MG capsule, Take 1 capsule by mouth 2 (Two) Times a Day As Needed for Constipation., Disp: 60 capsule, Rfl: 2  •  methadone (DOLOPHINE) 10 MG tablet, Take 50 mg by mouth Daily,, Disp: , Rfl:   •  Nexplanon 68 MG implant subdermal implant, , Disp: , Rfl:     Objective     Physical Exam:  Physical Exam  Vitals  "reviewed.   Constitutional:       General: She is not in acute distress.     Appearance: Normal appearance. She is well-developed. She is not ill-appearing.   Eyes:      General: No scleral icterus.  Pulmonary:      Effort: No respiratory distress.   Skin:     Coloration: Skin is not jaundiced.   Neurological:      Mental Status: She is alert and oriented to person, place, and time.      Gait: Gait normal.   Psychiatric:         Speech: Speech normal.         Behavior: Behavior normal.         Thought Content: Thought content normal. Thought content does not include homicidal or suicidal ideation. Thought content does not include homicidal or suicidal plan.         Vital Signs:   Vitals:    08/10/22 1101   BP: 98/62   Pulse: 78   Weight: 41.7 kg (92 lb)   Height: 154.9 cm (61\")     Body mass index is 17.38 kg/m².     Mental Status Exam:   Hygiene:   good  Cooperation:  Cooperative  Eye Contact:  Good  Psychomotor Behavior:  Appropriate  Affect:  Full range  Mood: normal  Speech:  Normal  Thought Process:  Goal directed  Thought Content:  Normal  Suicidal:  None  Homicidal:  None  Hallucinations:  None  Delusion:  None  Memory:  Intact  Orientation:  Person, Place, Time and Situation  Reliability:  good  Insight:  Good  Judgement:  Good  Impulse Control:  Good    Assessment / Plan      Assessment & Plan   -Continue bupropion 100 mg twice daily  -Continue docusate sodium 100 mg twice daily as needed  -Continue IOP  -Reach out to peer support as needed  -Will reach out to social workers to see if there are any community resources available to her to help with furnishing new apartment.  -Will draw labs to assess for underlying etiologies of fatigue and encouraged her to follow up with PCP     Visit Diagnoses     Fatigue, unspecified type    -  Primary    Relevant Orders    CBC & Differential    Comprehensive Metabolic Panel    TSH    Vitamin B12    Vitamin D 25 Hydroxy    Generalized anxiety disorder        Relevant " Medications    buPROPion (WELLBUTRIN) 100 MG tablet    Other Relevant Orders    TSH    Constipation due to opioid therapy        Relevant Medications    docusate sodium (Colace) 100 MG capsule      Diagnoses       Codes Comments    Fatigue, unspecified type    -  Primary ICD-10-CM: R53.83  ICD-9-CM: 780.79     Generalized anxiety disorder     ICD-10-CM: F41.1  ICD-9-CM: 300.02     Constipation due to opioid therapy     ICD-10-CM: K59.03, T40.2X5A  ICD-9-CM: 564.09, E935.2             PLAN:  1. Safety: No acute safety concerns  2. Risk Assessment: Risk of self-harm acutely is low. Risk of self-harm chronically is also low, but could be further elevated in the event of treatment noncompliance and/or AODA.      TREATMENT PLAN/GOALS: Continue supportive psychotherapy efforts and medications as indicated. Treatment and medication options discussed during today's visit. Patient acknowledged and verbally consented to continue with current treatment plan and was educated on the importance of compliance with treatment and follow-up appointments.      MEDICATION ISSUES:  RITO reviewed as expected.  Discussed medication options and treatment plan of prescribed medication as well as the risks, benefits, and side effects including potential falls, possible impaired driving and metabolic adversities among others. Patient is agreeable to call the office with any worsening of symptoms or onset of side effects. Patient is agreeable to call 911 or go to the nearest ER should he/she begin having SI/HI. No medication side effects or related complaints today.     MEDS ORDERED DURING VISIT:  New Medications Ordered This Visit   Medications   • buPROPion (WELLBUTRIN) 100 MG tablet     Sig: Take 1 tablet by mouth 2 (Two) Times a Day.     Dispense:  30 tablet     Refill:  2   • docusate sodium (Colace) 100 MG capsule     Sig: Take 1 capsule by mouth 2 (Two) Times a Day As Needed for Constipation.     Dispense:  60 capsule     Refill:  2        Return in about 3 months (around 11/10/2022) for Follow Up Medication.           This document has been electronically signed by YULI Lea  August 11, 2022 10:27 EDT      Part of this note may be an electronic transcription/translation of spoken language to printed text using the Dragon Dictation System.

## 2022-08-10 NOTE — PROGRESS NOTES
[facility] IOP GROUP     Date: 08/08/2022  Name: Ada Vogt    Time In: 1530   Time Out: 1626    This provider is located at [facility] located at [address] [place] [address], [address] [place] [id number].  The Patient is seen remotely at his/her home, using Zoom. Patient is being seen via telehealth and confirm that they are in a secure environment for this session. The patient's condition being diagnosed/treated is appropriate for telemedicine. The provider identified himself as well as his credentials. The patient gave consent to be seen remotely, and when consent is given they understand that the consent allows for patient identifiable information to be sent to a third party as needed. They may refuse to be seen remotely at any time. The electronic data is encrypted and password protected, and the patient has been advised of the potential risks to privacy not withstanding such measures.      Number of participants: 8    IOP GROUP NOTE     Data: 1 hour IOP group therapy session (Check-ins, Coping Skills, Relapse Prevention)     Check Ins: Therapist continued facilitation of rapport building strategies between group members. Therapist asked that each patient check in with home life and recovery efforts and identify triggers, cravings, and high risk situations that arise between group sessions. Therapist provided empathy and support during group session.     Session Content/Coping Skills: Check ins completed by group members. The [facility] [place] [facility] psychoeducational material reviewed with group members. Clinician explained cognitive triangle. Clinician discussed with group members the importance of being aware of thoughts.     Response: Patient attended class via Zoom. Patient participated in verbal completion of check in form. Patient during check in denied suicidal or homicidal thoughts. Patient participated in review of psychoeducational material.     Personal Assessment 0-10 Scale (10  worst)    Anxiety:  10   Depression:  0   Cravings: 0     Assessment:     ..  Lab on 08/03/2022   Component Date Value Ref Range Status   • Ethanol, Urine 08/03/2022 Negative  Cutoff=0.020 % Final   • THC, Screen, Urine 08/03/2022 Negative  Negative Final   • Phencyclidine (PCP), Urine 08/03/2022 Negative  Negative Final   • Cocaine Screen, Urine 08/03/2022 Negative  Negative Final   • Methamphetamine, Ur 08/03/2022 Negative  Negative Final   • Opiate Screen 08/03/2022 Negative  Negative Final   • Amphetamine Screen, Urine 08/03/2022 Negative  Negative Final   • Benzodiazepine Screen, Urine 08/03/2022 Negative  Negative Final   • Tricyclic Antidepressants Screen 08/03/2022 Negative  Negative Final   • Methadone Screen, Urine 08/03/2022 Positive (A) Negative Final   • Barbiturates Screen, Urine 08/03/2022 Negative  Negative Final   • Oxycodone Screen, Urine 08/03/2022 Negative  Negative Final   • Propoxyphene Screen 08/03/2022 Negative  Negative Final   • Buprenorphine, Screen, Urine 08/03/2022 Negative  Negative Final   Lab on 07/29/2022   Component Date Value Ref Range Status   • Ethanol, Urine 07/29/2022 Negative  Cutoff=0.020 % Final   • THC, Screen, Urine 07/29/2022 Negative  Negative Final   • Phencyclidine (PCP), Urine 07/29/2022 Negative  Negative Final   • Cocaine Screen, Urine 07/29/2022 Negative  Negative Final   • Methamphetamine, Ur 07/29/2022 Negative  Negative Final   • Opiate Screen 07/29/2022 Negative  Negative Final   • Amphetamine Screen, Urine 07/29/2022 Negative  Negative Final   • Benzodiazepine Screen, Urine 07/29/2022 Negative  Negative Final   • Tricyclic Antidepressants Screen 07/29/2022 Negative  Negative Final   • Methadone Screen, Urine 07/29/2022 Positive (A) Negative Final   • Barbiturates Screen, Urine 07/29/2022 Negative  Negative Final   • Oxycodone Screen, Urine 07/29/2022 Negative  Negative Final   • Propoxyphene Screen 07/29/2022 Negative  Negative Final   • Buprenorphine, Screen,  Urine 07/29/2022 Negative  Negative Final   • Methadone 07/29/2022 +POSITIVE+   Final   • Methadone, Quantitative 07/29/2022 >59270  ng/mg creat Final   • EDDP 07/29/2022 >18293  ng/mg creat Final   • Level of Detection: 07/29/2022 Comment   Final    Testing Threshold:  50 ng/mL                                                                       '  This test was developed and its performance characteristics  determined by [facility].  It has not been cleared or approved  by the Food and Drug Administration.   Lab on 07/22/2022   Component Date Value Ref Range Status   • Ethanol, Urine 07/22/2022 Negative  Cutoff=0.020 % Final   • THC, Screen, Urine 07/22/2022 Negative  Negative Final   • Phencyclidine (PCP), Urine 07/22/2022 Negative  Negative Final   • Cocaine Screen, Urine 07/22/2022 Negative  Negative Final   • Methamphetamine, Ur 07/22/2022 Negative  Negative Final   • Opiate Screen 07/22/2022 Negative  Negative Final   • Amphetamine Screen, Urine 07/22/2022 Negative  Negative Final   • Benzodiazepine Screen, Urine 07/22/2022 Negative  Negative Final   • Tricyclic Antidepressants Screen 07/22/2022 Negative  Negative Final   • Methadone Screen, Urine 07/22/2022 Positive (A) Negative Final   • Barbiturates Screen, Urine 07/22/2022 Negative  Negative Final   • Oxycodone Screen, Urine 07/22/2022 Negative  Negative Final   • Propoxyphene Screen 07/22/2022 Negative  Negative Final   • Buprenorphine, Screen, Urine 07/22/2022 Negative  Negative Final   • Methadone 07/22/2022 +POSITIVE+   Final   • Methadone, Quantitative 07/22/2022 2749  ng/mg creat Final   • EDDP 07/22/2022 >7752  ng/mg creat Final   • Level of Detection: 07/22/2022 Comment   Final    Testing Threshold:  50 ng/mL                                                                       '  This test was developed and its performance characteristics  determined by [facility].  It has not been cleared or approved  by the Food and Drug Administration.       Mental  Status Exam  Hygiene:  good  Dress: casual  Attitude: cooperative and agreeable   Motor Activity: appropriate  Eye Contact:  good  Speech: regular rate and rhythm   Mood:  calm and cooperative  Affect:  Appropriate  Thought Processes:  Linear  Thought Content:  Normal  Suicidal Thoughts:  denies  Homicidal Thoughts:  denies  Crisis Safety Plan: Safety plan has been discussed.   Hallucinations: Unknown to clinician.   Reliability: fair  Insight: fair  Judgement: fair  Impulse Control: fair    Recovery/spiritual support group attendance: No     Progress toward goal: Not at goal    Prognosis: Fair with Ongoing Treatment     Self-reported number of days sober: Patient reported 136 days on check in form.     Patient agreeable to adhere to medication regimen as prescribed and report any side effects. Patient will contact this office, call 911 or present to the nearest emergency room should suicidal or homicidal ideations occur.    Impression/Formulation:  No diagnosis found.    Clinical Maneuvering/Interventions: Therapist utilized a person-centered approach to build rapport with group member. Therapist implemented motivational interviewing techniques to assist client with exploring and resolving ambivalence associated with commitment to change behaviors related to substance use and addiction. Therapist applied cognitive behavioral strategies to facilitate identification of maladaptive patterns of thinking and behavior that contribute to client's risk for continued substance use and relapse. Therapist employed group interaction activities to build rapport among group members, promote sobriety, and emphasize relapse prevention. Therapist promoted safe nonjudgmental environment by providing group members with unconditional positive regard and encouraging group members to comply with group rules and guidelines. Therapist assisted group member with identifying and implementing healthier coping strategies.      Plan:  Continue  [facility] IOP Phase II  Aftercare:  [facility] Phase III  Program Assignments:  Personal recovery plan, relapse prevention plan, attendance of recovery support group meetings, exploration of sponsorship, drug/alcohol screens.     Amor Tabares LCSW  8/10/2022  08:42 EDT

## 2022-08-11 ENCOUNTER — TELEMEDICINE (OUTPATIENT)
Dept: PSYCHIATRY | Facility: CLINIC | Age: 37
End: 2022-08-11

## 2022-08-11 LAB
EDDP/CREAT UR: >6329 NG/MG CREAT
LEVEL OF DETECTION:: NORMAL
METHADONE UR QL CFM: NORMAL
METHADONE/CREAT UR: >6329 NG/MG CREAT

## 2022-08-12 LAB — ETHANOL UR-MCNC: NEGATIVE %

## 2022-08-15 ENCOUNTER — TELEMEDICINE (OUTPATIENT)
Dept: PSYCHIATRY | Facility: CLINIC | Age: 37
End: 2022-08-15

## 2022-08-15 NOTE — PROGRESS NOTES
CD IOP GROUP     Date: 08/15/2022  Name: Ada Vogt    Time In: 1530   Time Out: 1630    This provider is located at [facility] located at [address] [place] [address], [address] [place] [id number].  The Patient is seen remotely at his/her home, using Zoom. Patient is being seen via telehealth and confirm that they are in a secure environment for this session. The patient's condition being diagnosed/treated is appropriate for telemedicine. The provider identified himself as well as his credentials. The patient gave consent to be seen remotely, and when consent is given they understand that the consent allows for patient identifiable information to be sent to a third party as needed. They may refuse to be seen remotely at any time. The electronic data is encrypted and password protected, and the patient has been advised of the potential risks to privacy not withstanding such measures.      Number of participants: 10    IOP GROUP NOTE     Data: 1 hour IOP group therapy session (Check-ins, Coping Skills, Relapse Prevention)     Check Ins: Therapist continued facilitation of rapport building strategies between group members. Therapist asked that each patient check in with home life and recovery efforts and identify triggers, cravings, and high risk situations that arise between group sessions. Therapist provided empathy and support during group session.     Session Content/Coping Skills: Check ins completed by group members.  joined meeting. Goodbye letters to drug of choice discussed and group members who had completed assignment shared letters.     Response: Patient attended class via Zoom. Patient participated in verbal completion of check in form. Patient reported during check in that she attended meeting at [facility] this morning. Patient during check in denied suicidal or homicidal thoughts. Patient participated in group discussions.      Personal Assessment 0-10 Scale (10  worst)    Anxiety:  2   Depression:  4   Cravings: 3     Assessment:     ..  Lab on 08/10/2022   Component Date Value Ref Range Status   • Ethanol, Urine 08/10/2022 Negative  Cutoff=0.020 % Final   • THC, Screen, Urine 08/10/2022 Negative  Negative Final   • Phencyclidine (PCP), Urine 08/10/2022 Negative  Negative Final   • Cocaine Screen, Urine 08/10/2022 Negative  Negative Final   • Methamphetamine, Ur 08/10/2022 Negative  Negative Final   • Opiate Screen 08/10/2022 Negative  Negative Final   • Amphetamine Screen, Urine 08/10/2022 Negative  Negative Final   • Benzodiazepine Screen, Urine 08/10/2022 Negative  Negative Final   • Tricyclic Antidepressants Screen 08/10/2022 Negative  Negative Final   • Methadone Screen, Urine 08/10/2022 Positive (A) Negative Final   • Barbiturates Screen, Urine 08/10/2022 Negative  Negative Final   • Oxycodone Screen, Urine 08/10/2022 Negative  Negative Final   • Propoxyphene Screen 08/10/2022 Negative  Negative Final   • Buprenorphine, Screen, Urine 08/10/2022 Negative  Negative Final   Lab on 08/03/2022   Component Date Value Ref Range Status   • Ethanol, Urine 08/03/2022 Negative  Cutoff=0.020 % Final   • THC, Screen, Urine 08/03/2022 Negative  Negative Final   • Phencyclidine (PCP), Urine 08/03/2022 Negative  Negative Final   • Cocaine Screen, Urine 08/03/2022 Negative  Negative Final   • Methamphetamine, Ur 08/03/2022 Negative  Negative Final   • Opiate Screen 08/03/2022 Negative  Negative Final   • Amphetamine Screen, Urine 08/03/2022 Negative  Negative Final   • Benzodiazepine Screen, Urine 08/03/2022 Negative  Negative Final   • Tricyclic Antidepressants Screen 08/03/2022 Negative  Negative Final   • Methadone Screen, Urine 08/03/2022 Positive (A) Negative Final   • Barbiturates Screen, Urine 08/03/2022 Negative  Negative Final   • Oxycodone Screen, Urine 08/03/2022 Negative  Negative Final   • Propoxyphene Screen 08/03/2022 Negative  Negative Final   • Buprenorphine, Screen,  Urine 08/03/2022 Negative  Negative Final   • Methadone 08/03/2022 +POSITIVE+   Final   • Methadone, Quantitative 08/03/2022 >6329  ng/mg creat Final   • EDDP 08/03/2022 >6329  ng/mg creat Final   • Level of Detection: 08/03/2022 Comment   Final    Testing Threshold:  50 ng/mL                                                                       '  This test was developed and its performance characteristics  determined by [facility].  It has not been cleared or approved  by the Food and Drug Administration.   Lab on 07/29/2022   Component Date Value Ref Range Status   • Ethanol, Urine 07/29/2022 Negative  Cutoff=0.020 % Final   • THC, Screen, Urine 07/29/2022 Negative  Negative Final   • Phencyclidine (PCP), Urine 07/29/2022 Negative  Negative Final   • Cocaine Screen, Urine 07/29/2022 Negative  Negative Final   • Methamphetamine, Ur 07/29/2022 Negative  Negative Final   • Opiate Screen 07/29/2022 Negative  Negative Final   • Amphetamine Screen, Urine 07/29/2022 Negative  Negative Final   • Benzodiazepine Screen, Urine 07/29/2022 Negative  Negative Final   • Tricyclic Antidepressants Screen 07/29/2022 Negative  Negative Final   • Methadone Screen, Urine 07/29/2022 Positive (A) Negative Final   • Barbiturates Screen, Urine 07/29/2022 Negative  Negative Final   • Oxycodone Screen, Urine 07/29/2022 Negative  Negative Final   • Propoxyphene Screen 07/29/2022 Negative  Negative Final   • Buprenorphine, Screen, Urine 07/29/2022 Negative  Negative Final   • Methadone 07/29/2022 +POSITIVE+   Final   • Methadone, Quantitative 07/29/2022 >49602  ng/mg creat Final   • EDDP 07/29/2022 >54167  ng/mg creat Final   • Level of Detection: 07/29/2022 Comment   Final    Testing Threshold:  50 ng/mL                                                                       '  This test was developed and its performance characteristics  determined by [facility].  It has not been cleared or approved  by the Food and Drug Administration.        Mental Status Exam  Hygiene:  good  Dress: casual  Attitude: cooperative and agreeable   Motor Activity: appropriate  Eye Contact:  good  Speech: regular rate and rhythm   Mood:  calm and cooperative  Affect:  Appropriate  Thought Processes:  Linear  Thought Content:  Normal  Suicidal Thoughts:  denies  Homicidal Thoughts:  denies  Crisis Safety Plan: Safety plan has been discussed.   Hallucinations: Unknown to clinician.   Reliability: fair  Insight: fair  Judgement: fair  Impulse Control: fair    Recovery/spiritual support group attendance: Yes, Patient reported she had attended meeting at [facility] this morning.      Progress toward goal: Not at goal    Prognosis: Fair with Ongoing Treatment     Self-reported number of days sober: Patient reported 143 during check in.     Patient agreeable to adhere to medication regimen as prescribed and report any side effects. Patient will contact this office, call 911 or present to the nearest emergency room should suicidal or homicidal ideations occur.    Impression/Formulation:  No diagnosis found.    Clinical Maneuvering/Interventions: Therapist utilized a person-centered approach to build rapport with group member. Therapist implemented motivational interviewing techniques to assist client with exploring and resolving ambivalence associated with commitment to change behaviors related to substance use and addiction. Therapist applied cognitive behavioral strategies to facilitate identification of maladaptive patterns of thinking and behavior that contribute to client's risk for continued substance use and relapse. Therapist employed group interaction activities to build rapport among group members, promote sobriety, and emphasize relapse prevention. Therapist promoted safe nonjudgmental environment by providing group members with unconditional positive regard and encouraging group members to comply with group rules and guidelines. Therapist assisted group member with  identifying and implementing healthier coping strategies.      Plan:  Continue [facility] IOP Phase II   Aftercare:  [facility] Phase III  Program Assignments:  Personal recovery plan, relapse prevention plan, attendance of recovery support group meetings, exploration of sponsorship, drug/alcohol screens.     Amor Tabares LCSW  8/18/2022  11:44 EDT

## 2022-08-15 NOTE — PROGRESS NOTES
"CD IOP GROUP     Date: 08/11/2022  Name: Ada Vogt    Time In: 1530  Time Out: 1630    This provider is located at [facility] located at [address] [place] [address], [address] [place] [id number].  The Patient is seen remotely at his/her home, using Zoom. Patient is being seen via telehealth and confirm that they are in a secure environment for this session. The patient's condition being diagnosed/treated is appropriate for telemedicine. The provider identified himself as well as his credentials. The patient gave consent to be seen remotely, and when consent is given they understand that the consent allows for patient identifiable information to be sent to a third party as needed. They may refuse to be seen remotely at any time. The electronic data is encrypted and password protected, and the patient has been advised of the potential risks to privacy not withstanding such measures.      Number of participants: 9    IOP GROUP NOTE     Data: 1 hour IOP group therapy session (Check-ins, Coping Skills, Relapse Prevention)     Check Ins: Therapist continued facilitation of rapport building strategies between group members. Therapist asked that each patient check in with home life and recovery efforts and identify triggers, cravings, and high risk situations that arise between group sessions. Therapist provided empathy and support during group session.     Session Content/Coping Skills: Check ins completed by group members. Clinician discussed with group members grief in recovery. Goodbye Letters to Drug of Choice activity ([email or web address]) assigned as homework for next session.     Response: Patient attended class via Zoom. Patient participated in verbal completion of check in form. Patient during check in answered no to question \"currently or since last group meeting have you had any suicidal thoughts or plan or intent to hurt yourself”, and patient also answered no to question of \"currently or since your last " "group meeting, have you had any homicidal thoughts or plan or intent to hurt others\". Patient answered no to all other questions during check in. Patient participated in group discussions.      Personal Assessment 0-10 Scale (10 worst)    Anxiety:  4   Depression:  6   Cravings: 1     Assessment:     ..  Lab on 08/10/2022   Component Date Value Ref Range Status   • Ethanol, Urine 08/10/2022 Negative  Cutoff=0.020 % Final   • THC, Screen, Urine 08/10/2022 Negative  Negative Final   • Phencyclidine (PCP), Urine 08/10/2022 Negative  Negative Final   • Cocaine Screen, Urine 08/10/2022 Negative  Negative Final   • Methamphetamine, Ur 08/10/2022 Negative  Negative Final   • Opiate Screen 08/10/2022 Negative  Negative Final   • Amphetamine Screen, Urine 08/10/2022 Negative  Negative Final   • Benzodiazepine Screen, Urine 08/10/2022 Negative  Negative Final   • Tricyclic Antidepressants Screen 08/10/2022 Negative  Negative Final   • Methadone Screen, Urine 08/10/2022 Positive (A) Negative Final   • Barbiturates Screen, Urine 08/10/2022 Negative  Negative Final   • Oxycodone Screen, Urine 08/10/2022 Negative  Negative Final   • Propoxyphene Screen 08/10/2022 Negative  Negative Final   • Buprenorphine, Screen, Urine 08/10/2022 Negative  Negative Final   Lab on 08/03/2022   Component Date Value Ref Range Status   • Ethanol, Urine 08/03/2022 Negative  Cutoff=0.020 % Final   • THC, Screen, Urine 08/03/2022 Negative  Negative Final   • Phencyclidine (PCP), Urine 08/03/2022 Negative  Negative Final   • Cocaine Screen, Urine 08/03/2022 Negative  Negative Final   • Methamphetamine, Ur 08/03/2022 Negative  Negative Final   • Opiate Screen 08/03/2022 Negative  Negative Final   • Amphetamine Screen, Urine 08/03/2022 Negative  Negative Final   • Benzodiazepine Screen, Urine 08/03/2022 Negative  Negative Final   • Tricyclic Antidepressants Screen 08/03/2022 Negative  Negative Final   • Methadone Screen, Urine 08/03/2022 Positive (A) " Negative Final   • Barbiturates Screen, Urine 08/03/2022 Negative  Negative Final   • Oxycodone Screen, Urine 08/03/2022 Negative  Negative Final   • Propoxyphene Screen 08/03/2022 Negative  Negative Final   • Buprenorphine, Screen, Urine 08/03/2022 Negative  Negative Final   • Methadone 08/03/2022 +POSITIVE+   Final   • Methadone, Quantitative 08/03/2022 >6329  ng/mg creat Final   • EDDP 08/03/2022 >6329  ng/mg creat Final   • Level of Detection: 08/03/2022 Comment   Final    Testing Threshold:  50 ng/mL                                                                       '  This test was developed and its performance characteristics  determined by [facility].  It has not been cleared or approved  by the Food and Drug Administration.   Lab on 07/29/2022   Component Date Value Ref Range Status   • Ethanol, Urine 07/29/2022 Negative  Cutoff=0.020 % Final   • THC, Screen, Urine 07/29/2022 Negative  Negative Final   • Phencyclidine (PCP), Urine 07/29/2022 Negative  Negative Final   • Cocaine Screen, Urine 07/29/2022 Negative  Negative Final   • Methamphetamine, Ur 07/29/2022 Negative  Negative Final   • Opiate Screen 07/29/2022 Negative  Negative Final   • Amphetamine Screen, Urine 07/29/2022 Negative  Negative Final   • Benzodiazepine Screen, Urine 07/29/2022 Negative  Negative Final   • Tricyclic Antidepressants Screen 07/29/2022 Negative  Negative Final   • Methadone Screen, Urine 07/29/2022 Positive (A) Negative Final   • Barbiturates Screen, Urine 07/29/2022 Negative  Negative Final   • Oxycodone Screen, Urine 07/29/2022 Negative  Negative Final   • Propoxyphene Screen 07/29/2022 Negative  Negative Final   • Buprenorphine, Screen, Urine 07/29/2022 Negative  Negative Final   • Methadone 07/29/2022 +POSITIVE+   Final   • Methadone, Quantitative 07/29/2022 >65987  ng/mg creat Final   • EDDP 07/29/2022 >28968  ng/mg creat Final   • Level of Detection: 07/29/2022 Comment   Final    Testing Threshold:  50 ng/mL                                                                        '  This test was developed and its performance characteristics  determined by [facility].  It has not been cleared or approved  by the Food and Drug Administration.   Lab on 07/22/2022   Component Date Value Ref Range Status   • Ethanol, Urine 07/22/2022 Negative  Cutoff=0.020 % Final   • THC, Screen, Urine 07/22/2022 Negative  Negative Final   • Phencyclidine (PCP), Urine 07/22/2022 Negative  Negative Final   • Cocaine Screen, Urine 07/22/2022 Negative  Negative Final   • Methamphetamine, Ur 07/22/2022 Negative  Negative Final   • Opiate Screen 07/22/2022 Negative  Negative Final   • Amphetamine Screen, Urine 07/22/2022 Negative  Negative Final   • Benzodiazepine Screen, Urine 07/22/2022 Negative  Negative Final   • Tricyclic Antidepressants Screen 07/22/2022 Negative  Negative Final   • Methadone Screen, Urine 07/22/2022 Positive (A) Negative Final   • Barbiturates Screen, Urine 07/22/2022 Negative  Negative Final   • Oxycodone Screen, Urine 07/22/2022 Negative  Negative Final   • Propoxyphene Screen 07/22/2022 Negative  Negative Final   • Buprenorphine, Screen, Urine 07/22/2022 Negative  Negative Final   • Methadone 07/22/2022 +POSITIVE+   Final   • Methadone, Quantitative 07/22/2022 2749  ng/mg creat Final   • EDDP 07/22/2022 >7752  ng/mg creat Final   • Level of Detection: 07/22/2022 Comment   Final    Testing Threshold:  50 ng/mL                                                                       '  This test was developed and its performance characteristics  determined by [facility].  It has not been cleared or approved  by the Food and Drug Administration.       Mental Status Exam  Hygiene:  good  Dress: casual  Attitude: cooperative and agreeable   Motor Activity: appropriate  Eye Contact:  good  Speech: regular rate and rhythm   Mood:  calm and cooperative  Affect:  Appropriate  Thought Processes:  Linear  Thought Content:  Normal  Suicidal Thoughts:   denies  Homicidal Thoughts:  denies  Crisis Safety Plan: Safety plan has been discussed.   Hallucinations: Unknown to clinician.   Reliability: fair  Insight: fair  Judgement: fair  Impulse Control: fair    Recovery/spiritual support group attendance: Yes, Patient reported she attended 2 meetings on the [facility].      Progress toward goal: Not at goal    Prognosis: Fair with Ongoing Treatment     Self-reported number of days sober: Patient reported 139 on check in form.     Patient agreeable to adhere to medication regimen as prescribed and report any side effects. Patient will contact this office, call 911 or present to the nearest emergency room should suicidal or homicidal ideations occur.    Impression/Formulation:  No diagnosis found.    Clinical Maneuvering/Interventions: Therapist utilized a person-centered approach to build rapport with group member. Therapist implemented motivational interviewing techniques to assist client with exploring and resolving ambivalence associated with commitment to change behaviors related to substance use and addiction. Therapist applied cognitive behavioral strategies to facilitate identification of maladaptive patterns of thinking and behavior that contribute to client's risk for continued substance use and relapse. Therapist employed group interaction activities to build rapport among group members, promote sobriety, and emphasize relapse prevention. Therapist promoted safe nonjudgmental environment by providing group members with unconditional positive regard and encouraging group members to comply with group rules and guidelines. Therapist assisted group member with identifying and implementing healthier coping strategies.      Plan:  Continue [facility] IOP Phase II   Aftercare:  [facility] Phase III  Program Assignments:  Personal recovery plan, relapse prevention plan, attendance of recovery support group  meetings, exploration of sponsorship, drug/alcohol screens.     Amor Tabares LCSW  8/15/2022  11:54 EDT

## 2022-08-18 ENCOUNTER — TELEMEDICINE (OUTPATIENT)
Dept: PSYCHIATRY | Facility: CLINIC | Age: 37
End: 2022-08-18

## 2022-08-19 ENCOUNTER — LAB (OUTPATIENT)
Dept: LAB | Facility: HOSPITAL | Age: 37
End: 2022-08-19

## 2022-08-19 DIAGNOSIS — F11.20 OPIOID USE DISORDER, SEVERE, DEPENDENCE: ICD-10-CM

## 2022-08-19 DIAGNOSIS — F15.20 METHAMPHETAMINE USE DISORDER, SEVERE, DEPENDENCE: ICD-10-CM

## 2022-08-19 PROCEDURE — 80307 DRUG TEST PRSMV CHEM ANLYZR: CPT

## 2022-08-19 PROCEDURE — G0480 DRUG TEST DEF 1-7 CLASSES: HCPCS

## 2022-08-20 LAB — ETHANOL UR-MCNC: NEGATIVE %

## 2022-08-21 ENCOUNTER — APPOINTMENT (OUTPATIENT)
Dept: GENERAL RADIOLOGY | Facility: HOSPITAL | Age: 37
End: 2022-08-21

## 2022-08-21 ENCOUNTER — HOSPITAL ENCOUNTER (EMERGENCY)
Facility: HOSPITAL | Age: 37
Discharge: HOME | End: 2022-08-21
Attending: EMERGENCY MEDICINE | Admitting: EMERGENCY MEDICINE

## 2022-08-21 VITALS
BODY MASS INDEX: 16.99 KG/M2 | HEIGHT: 61 IN | TEMPERATURE: 100.3 F | OXYGEN SATURATION: 93 % | RESPIRATION RATE: 22 BRPM | HEART RATE: 112 BPM | SYSTOLIC BLOOD PRESSURE: 101 MMHG | DIASTOLIC BLOOD PRESSURE: 71 MMHG | WEIGHT: 90 LBS

## 2022-08-21 DIAGNOSIS — J18.9 PNEUMONIA OF RIGHT LOWER LOBE DUE TO INFECTIOUS ORGANISM: Primary | ICD-10-CM

## 2022-08-21 LAB
ALBUMIN SERPL-MCNC: 3.6 G/DL (ref 3.5–5.2)
ALBUMIN/GLOB SERPL: 1.4 G/DL
ALP SERPL-CCNC: 102 U/L (ref 39–117)
ALT SERPL W P-5'-P-CCNC: 20 U/L (ref 1–33)
ANION GAP SERPL CALCULATED.3IONS-SCNC: 9.2 MMOL/L (ref 5–15)
AST SERPL-CCNC: 24 U/L (ref 1–32)
BASOPHILS # BLD AUTO: 0.04 10*3/MM3 (ref 0–0.2)
BASOPHILS NFR BLD AUTO: 0.3 % (ref 0–1.5)
BILIRUB SERPL-MCNC: 0.4 MG/DL (ref 0–1.2)
BUN SERPL-MCNC: 5 MG/DL (ref 6–20)
BUN/CREAT SERPL: 10.2 (ref 7–25)
CALCIUM SPEC-SCNC: 8.7 MG/DL (ref 8.6–10.5)
CHLORIDE SERPL-SCNC: 99 MMOL/L (ref 98–107)
CO2 SERPL-SCNC: 28.8 MMOL/L (ref 22–29)
CREAT SERPL-MCNC: 0.49 MG/DL (ref 0.57–1)
CRP SERPL-MCNC: 16.65 MG/DL (ref 0–0.5)
D-LACTATE SERPL-SCNC: 1.2 MMOL/L (ref 0.5–2)
DEPRECATED RDW RBC AUTO: 40.4 FL (ref 37–54)
EGFRCR SERPLBLD CKD-EPI 2021: 126.2 ML/MIN/1.73
EOSINOPHIL # BLD AUTO: 0.05 10*3/MM3 (ref 0–0.4)
EOSINOPHIL NFR BLD AUTO: 0.4 % (ref 0.3–6.2)
ERYTHROCYTE [DISTWIDTH] IN BLOOD BY AUTOMATED COUNT: 13.1 % (ref 12.3–15.4)
FERRITIN SERPL-MCNC: 220.4 NG/ML (ref 13–150)
FIBRINOGEN PPP-MCNC: 705 MG/DL (ref 209–518)
GLOBULIN UR ELPH-MCNC: 2.6 GM/DL
GLUCOSE SERPL-MCNC: 82 MG/DL (ref 65–99)
HCT VFR BLD AUTO: 36.7 % (ref 34–46.6)
HGB BLD-MCNC: 12.4 G/DL (ref 12–15.9)
IMM GRANULOCYTES # BLD AUTO: 0.03 10*3/MM3 (ref 0–0.05)
IMM GRANULOCYTES NFR BLD AUTO: 0.2 % (ref 0–0.5)
LYMPHOCYTES # BLD AUTO: 1.35 10*3/MM3 (ref 0.7–3.1)
LYMPHOCYTES NFR BLD AUTO: 10 % (ref 19.6–45.3)
MCH RBC QN AUTO: 28.9 PG (ref 26.6–33)
MCHC RBC AUTO-ENTMCNC: 33.8 G/DL (ref 31.5–35.7)
MCV RBC AUTO: 85.5 FL (ref 79–97)
MONOCYTES # BLD AUTO: 0.71 10*3/MM3 (ref 0.1–0.9)
MONOCYTES NFR BLD AUTO: 5.2 % (ref 5–12)
NEUTROPHILS NFR BLD AUTO: 11.35 10*3/MM3 (ref 1.7–7)
NEUTROPHILS NFR BLD AUTO: 83.9 % (ref 42.7–76)
NRBC BLD AUTO-RTO: 0 /100 WBC (ref 0–0.2)
PLATELET # BLD AUTO: 363 10*3/MM3 (ref 140–450)
PMV BLD AUTO: 9.1 FL (ref 6–12)
POTASSIUM SERPL-SCNC: 3.5 MMOL/L (ref 3.5–5.2)
PROCALCITONIN SERPL-MCNC: 2.3 NG/ML (ref 0–0.25)
PROT SERPL-MCNC: 6.2 G/DL (ref 6–8.5)
RBC # BLD AUTO: 4.29 10*6/MM3 (ref 3.77–5.28)
SARS-COV-2 RNA PNL SPEC NAA+PROBE: NOT DETECTED
SODIUM SERPL-SCNC: 137 MMOL/L (ref 136–145)
TROPONIN T SERPL-MCNC: <0.01 NG/ML (ref 0–0.03)
WBC NRBC COR # BLD: 13.53 10*3/MM3 (ref 3.4–10.8)

## 2022-08-21 PROCEDURE — 87635 SARS-COV-2 COVID-19 AMP PRB: CPT | Performed by: PHYSICIAN ASSISTANT

## 2022-08-21 PROCEDURE — 86140 C-REACTIVE PROTEIN: CPT | Performed by: PHYSICIAN ASSISTANT

## 2022-08-21 PROCEDURE — 99283 EMERGENCY DEPT VISIT LOW MDM: CPT

## 2022-08-21 PROCEDURE — 84145 PROCALCITONIN (PCT): CPT | Performed by: PHYSICIAN ASSISTANT

## 2022-08-21 PROCEDURE — 85384 FIBRINOGEN ACTIVITY: CPT | Performed by: PHYSICIAN ASSISTANT

## 2022-08-21 PROCEDURE — 96374 THER/PROPH/DIAG INJ IV PUSH: CPT

## 2022-08-21 PROCEDURE — 82728 ASSAY OF FERRITIN: CPT | Performed by: PHYSICIAN ASSISTANT

## 2022-08-21 PROCEDURE — 83605 ASSAY OF LACTIC ACID: CPT | Performed by: PHYSICIAN ASSISTANT

## 2022-08-21 PROCEDURE — 80053 COMPREHEN METABOLIC PANEL: CPT | Performed by: PHYSICIAN ASSISTANT

## 2022-08-21 PROCEDURE — 85025 COMPLETE CBC W/AUTO DIFF WBC: CPT | Performed by: PHYSICIAN ASSISTANT

## 2022-08-21 PROCEDURE — 93005 ELECTROCARDIOGRAM TRACING: CPT | Performed by: PHYSICIAN ASSISTANT

## 2022-08-21 PROCEDURE — 71045 X-RAY EXAM CHEST 1 VIEW: CPT

## 2022-08-21 PROCEDURE — 25010000002 KETOROLAC TROMETHAMINE PER 15 MG: Performed by: PHYSICIAN ASSISTANT

## 2022-08-21 PROCEDURE — 84484 ASSAY OF TROPONIN QUANT: CPT | Performed by: PHYSICIAN ASSISTANT

## 2022-08-21 RX ORDER — SODIUM CHLORIDE 0.9 % (FLUSH) 0.9 %
10 SYRINGE (ML) INJECTION AS NEEDED
Status: DISCONTINUED | OUTPATIENT
Start: 2022-08-21 | End: 2022-08-21 | Stop reason: HOSPADM

## 2022-08-21 RX ORDER — IBUPROFEN 800 MG/1
800 TABLET ORAL EVERY 8 HOURS PRN
Qty: 60 TABLET | Refills: 0 | Status: SHIPPED | OUTPATIENT
Start: 2022-08-21 | End: 2022-12-14

## 2022-08-21 RX ORDER — DOXYCYCLINE 100 MG/1
100 CAPSULE ORAL 2 TIMES DAILY
Qty: 20 CAPSULE | Refills: 0 | Status: SHIPPED | OUTPATIENT
Start: 2022-08-21 | End: 2022-12-14

## 2022-08-21 RX ORDER — ALBUTEROL SULFATE 90 UG/1
2 AEROSOL, METERED RESPIRATORY (INHALATION) EVERY 4 HOURS PRN
Qty: 6.7 G | Refills: 0 | Status: SHIPPED | OUTPATIENT
Start: 2022-08-21 | End: 2022-12-14

## 2022-08-21 RX ORDER — KETOROLAC TROMETHAMINE 30 MG/ML
15 INJECTION, SOLUTION INTRAMUSCULAR; INTRAVENOUS ONCE
Status: COMPLETED | OUTPATIENT
Start: 2022-08-21 | End: 2022-08-21

## 2022-08-21 RX ADMIN — KETOROLAC TROMETHAMINE 15 MG: 30 INJECTION, SOLUTION INTRAMUSCULAR; INTRAVENOUS at 14:59

## 2022-08-21 RX ADMIN — SODIUM CHLORIDE 500 ML: 9 INJECTION, SOLUTION INTRAVENOUS at 13:56

## 2022-08-21 NOTE — ED PROVIDER NOTES
Subjective   38 y/o female that presents to emergency medicine with chief complaint cough, congestion, chest wall pain.  Patient states she has had productive cough with scant yellow/green sputum.  Patient has had chills but no documented fever.  Patient also states she has had intermittent chest wall pain worsening with deep inspiration.  Patient denies previous history of coronary artery disease, diabetes, hypertension.  She does report she smokes approximate 1 pack cigarettes per day.  Denies any alcohol or drug abuse.      History provided by:  Patient   used: No    Cough  Cough characteristics:  Productive  Sputum characteristics:  Yellow and green  Severity:  Moderate  Onset quality:  Gradual  Duration:  10 days  Timing:  Intermittent  Progression:  Worsening  Chronicity:  New  Smoker: no    Context: not animal exposure, not exposure to allergens, not sick contacts, not upper respiratory infection and not weather changes    Relieved by:  Nothing  Worsened by:  Nothing  Ineffective treatments:  None tried  Associated symptoms: chills, myalgias and shortness of breath    Associated symptoms: no chest pain, no ear fullness, no eye discharge, no headaches, no rash, no sinus congestion, no sore throat and no weight loss    Risk factors: no chemical exposure, no recent infection and no recent travel        Review of Systems   Constitutional: Positive for chills. Negative for weight loss.   HENT: Negative.  Negative for congestion, dental problem, drooling, ear discharge and sore throat.    Eyes: Negative.  Negative for pain, discharge, redness and itching.   Respiratory: Positive for cough, chest tightness and shortness of breath. Negative for apnea.    Cardiovascular: Negative.  Negative for chest pain and leg swelling.   Gastrointestinal: Negative.  Negative for anal bleeding, blood in stool and constipation.   Endocrine: Negative.  Negative for cold intolerance, heat intolerance, polydipsia,  polyphagia and polyuria.   Genitourinary: Negative.  Negative for dyspareunia, dysuria, enuresis, flank pain, frequency, genital sores, hematuria, menstrual problem, pelvic pain and urgency.   Musculoskeletal: Positive for myalgias. Negative for arthralgias and back pain.   Skin: Negative.  Negative for rash.   Neurological: Negative.  Negative for seizures, facial asymmetry, speech difficulty, light-headedness, numbness and headaches.   Hematological: Negative.    Psychiatric/Behavioral: Negative.  Negative for behavioral problems, confusion and decreased concentration.   All other systems reviewed and are negative.      Past Medical History:   Diagnosis Date   • Anxiety    • Depression    • Panic disorder        Allergies   Allergen Reactions   • Amoxicillin Hives   • Penicillins Hives       Past Surgical History:   Procedure Laterality Date   •  SECTION  2020       Family History   Problem Relation Age of Onset   • Alcohol abuse Father    • Drug abuse Brother    • Alcohol abuse Brother        Social History     Socioeconomic History   • Marital status: Single   Tobacco Use   • Smoking status: Current Every Day Smoker     Packs/day: 1.00     Types: Cigarettes   • Smokeless tobacco: Never Used   Vaping Use   • Vaping Use: Some days   • Substances: Nicotine, Flavoring   • Devices: Disposable   Substance and Sexual Activity   • Alcohol use: Not Currently   • Drug use: Not Currently     Types: Methamphetamines, Codeine     Comment: States she has not used in 3 weeks   • Sexual activity: Defer           Objective   Physical Exam  Vitals and nursing note reviewed.   Constitutional:       General: She is not in acute distress.     Appearance: Normal appearance. She is normal weight. She is not ill-appearing, toxic-appearing or diaphoretic.   HENT:      Head: Normocephalic and atraumatic.      Right Ear: Tympanic membrane, ear canal and external ear normal. There is no impacted cerumen.      Left Ear: Tympanic  membrane, ear canal and external ear normal. There is no impacted cerumen.      Nose: Nose normal. No congestion or rhinorrhea.      Mouth/Throat:      Mouth: Mucous membranes are moist.      Pharynx: Oropharynx is clear. No oropharyngeal exudate or posterior oropharyngeal erythema.   Eyes:      General: No scleral icterus.        Right eye: No discharge.         Left eye: No discharge.      Extraocular Movements: Extraocular movements intact.      Conjunctiva/sclera: Conjunctivae normal.      Pupils: Pupils are equal, round, and reactive to light.   Neck:      Vascular: No carotid bruit.   Cardiovascular:      Rate and Rhythm: Normal rate and regular rhythm.      Pulses: Normal pulses.      Heart sounds: Normal heart sounds. No murmur heard.    No friction rub. No gallop.   Pulmonary:      Effort: Pulmonary effort is normal. No respiratory distress.      Breath sounds: No stridor. Wheezing and rhonchi present. No rales.   Chest:      Chest wall: No tenderness.   Abdominal:      General: Abdomen is flat. Bowel sounds are normal. There is no distension.      Palpations: Abdomen is soft. There is no mass.      Tenderness: There is no abdominal tenderness. There is no right CVA tenderness, left CVA tenderness, guarding or rebound.      Hernia: No hernia is present.   Musculoskeletal:         General: No swelling, tenderness, deformity or signs of injury. Normal range of motion.      Cervical back: Normal range of motion and neck supple. No rigidity or tenderness.      Right lower leg: No edema.      Left lower leg: No edema.   Lymphadenopathy:      Cervical: No cervical adenopathy.   Skin:     General: Skin is warm.      Coloration: Skin is not jaundiced or pale.      Findings: No bruising, erythema, lesion or rash.   Neurological:      General: No focal deficit present.      Mental Status: She is alert and oriented to person, place, and time. Mental status is at baseline.      Cranial Nerves: No cranial nerve deficit.       Sensory: No sensory deficit.      Motor: No weakness.      Coordination: Coordination normal.      Gait: Gait normal.      Deep Tendon Reflexes: Reflexes normal.   Psychiatric:         Mood and Affect: Mood normal.         Behavior: Behavior normal.         Thought Content: Thought content normal.         Judgment: Judgment normal.         Procedures           ED Course  ED Course as of 08/21/22 1441   Sun Aug 21, 2022   1429   IMPRESSION:  Right basilar opacity most consistent with pneumonia [BH]      ED Course User Index  [BH] Vijay Enriquez PA-C                                           [facility]    Final diagnoses:   Pneumonia of right lower lobe due to infectious organism       ED Disposition  ED Disposition     ED Disposition   Discharge    Condition   Stable    Comment   --             [address] [facility] [place] [address] [phone]  Call in 1 day           Medication List      New Prescriptions    albuterol sulfate  (90 Base) MCG/ACT inhaler  Commonly known as: PROVENTIL HFA;VENTOLIN HFA;PROAIR HFA  Inhale 2 puffs Every 4 (Four) Hours As Needed for Wheezing.     doxycycline 100 MG capsule  Commonly known as: MONODOX  Take 1 capsule by mouth 2 (Two) Times a Day.     ibuprofen 800 MG tablet  Commonly known as: ADVIL,MOTRIN  Take 1 tablet by mouth Every 8 (Eight) Hours As Needed for Mild Pain .           Where to Get Your Medications      These medications were sent to [id number] - [place], [place] - [address] AT [facility] [address] - [phone]  - [phone] FX  [address] [place] [address]    Phone: [phone]   · albuterol sulfate  (90 Base) MCG/ACT inhaler  · doxycycline 100 MG capsule  · ibuprofen 800 MG tablet          Vijay Enriquez PA-C  08/21/22 1441

## 2022-08-22 ENCOUNTER — TELEMEDICINE (OUTPATIENT)
Dept: PSYCHIATRY | Facility: CLINIC | Age: 37
End: 2022-08-22

## 2022-08-22 NOTE — PROGRESS NOTES
"[facility] IOP GROUP     Date: 08/18/2022  Name: Ada Vogt    Time In: 1540   Time Out: 1630    This provider is located at [facility] located at [address] [place] [address], [address] [place] [id number].  The Patient is seen remotely at his/her home, using Zoom. Patient is being seen via telehealth and confirm that they are in a secure environment for this session. The patient's condition being diagnosed/treated is appropriate for telemedicine. The provider identified himself as well as his credentials. The patient gave consent to be seen remotely, and when consent is given they understand that the consent allows for patient identifiable information to be sent to a third party as needed. They may refuse to be seen remotely at any time. The electronic data is encrypted and password protected, and the patient has been advised of the potential risks to privacy not withstanding such measures.      Number of participants: 9    IOP GROUP NOTE     Data: 1 hour IOP group therapy session (Check-ins, Coping Skills, Relapse Prevention)     Check Ins: Therapist continued facilitation of rapport building strategies between group members. Therapist asked that each patient check in with home life and recovery efforts and identify triggers, cravings, and high risk situations that arise between group sessions. Therapist provided empathy and support during group session.     Session Content/Coping Skills: Clinician discussed goodbye letters with group members and processed experience of writing letter. Check ins completed with group members. Clinician discussed with group members Overdose Awareness Day. YULI Combs and Gini,  joined meeting.     Response: Patient attended class via Zoom. Patient participated in verbal completion of check in form. Patient during check in answered no to question \"currently or since last group meeting have you had any suicidal thoughts or plan or intent to hurt yourself”, and " "patient also answered no to question of \"currently or since your last group meeting, have you had any homicidal thoughts or plan or intent to hurt others\". Patient answered no to all other questions during check in. Patient participated in group discussions.      Personal Assessment 0-10 Scale (10 worst)    Anxiety:  5   Depression:  0   Cravings: 0     Assessment:     ..  Lab on 08/10/2022   Component Date Value Ref Range Status   • Ethanol, Urine 08/10/2022 Negative  Cutoff=0.020 % Final   • THC, Screen, Urine 08/10/2022 Negative  Negative Final   • Phencyclidine (PCP), Urine 08/10/2022 Negative  Negative Final   • Cocaine Screen, Urine 08/10/2022 Negative  Negative Final   • Methamphetamine, Ur 08/10/2022 Negative  Negative Final   • Opiate Screen 08/10/2022 Negative  Negative Final   • Amphetamine Screen, Urine 08/10/2022 Negative  Negative Final   • Benzodiazepine Screen, Urine 08/10/2022 Negative  Negative Final   • Tricyclic Antidepressants Screen 08/10/2022 Negative  Negative Final   • Methadone Screen, Urine 08/10/2022 Positive (A) Negative Final   • Barbiturates Screen, Urine 08/10/2022 Negative  Negative Final   • Oxycodone Screen, Urine 08/10/2022 Negative  Negative Final   • Propoxyphene Screen 08/10/2022 Negative  Negative Final   • Buprenorphine, Screen, Urine 08/10/2022 Negative  Negative Final   Lab on 08/03/2022   Component Date Value Ref Range Status   • Ethanol, Urine 08/03/2022 Negative  Cutoff=0.020 % Final   • THC, Screen, Urine 08/03/2022 Negative  Negative Final   • Phencyclidine (PCP), Urine 08/03/2022 Negative  Negative Final   • Cocaine Screen, Urine 08/03/2022 Negative  Negative Final   • Methamphetamine, Ur 08/03/2022 Negative  Negative Final   • Opiate Screen 08/03/2022 Negative  Negative Final   • Amphetamine Screen, Urine 08/03/2022 Negative  Negative Final   • Benzodiazepine Screen, Urine 08/03/2022 Negative  Negative Final   • Tricyclic Antidepressants Screen 08/03/2022 Negative  " Negative Final   • Methadone Screen, Urine 08/03/2022 Positive (A) Negative Final   • Barbiturates Screen, Urine 08/03/2022 Negative  Negative Final   • Oxycodone Screen, Urine 08/03/2022 Negative  Negative Final   • Propoxyphene Screen 08/03/2022 Negative  Negative Final   • Buprenorphine, Screen, Urine 08/03/2022 Negative  Negative Final   • Methadone 08/03/2022 +POSITIVE+   Final   • Methadone, Quantitative 08/03/2022 >6329  ng/mg creat Final   • EDDP 08/03/2022 >6329  ng/mg creat Final   • Level of Detection: 08/03/2022 Comment   Final    Testing Threshold:  50 ng/mL                                                                       '  This test was developed and its performance characteristics  determined by [facility].  It has not been cleared or approved  by the Food and Drug Administration.   Lab on 07/29/2022   Component Date Value Ref Range Status   • Ethanol, Urine 07/29/2022 Negative  Cutoff=0.020 % Final   • THC, Screen, Urine 07/29/2022 Negative  Negative Final   • Phencyclidine (PCP), Urine 07/29/2022 Negative  Negative Final   • Cocaine Screen, Urine 07/29/2022 Negative  Negative Final   • Methamphetamine, Ur 07/29/2022 Negative  Negative Final   • Opiate Screen 07/29/2022 Negative  Negative Final   • Amphetamine Screen, Urine 07/29/2022 Negative  Negative Final   • Benzodiazepine Screen, Urine 07/29/2022 Negative  Negative Final   • Tricyclic Antidepressants Screen 07/29/2022 Negative  Negative Final   • Methadone Screen, Urine 07/29/2022 Positive (A) Negative Final   • Barbiturates Screen, Urine 07/29/2022 Negative  Negative Final   • Oxycodone Screen, Urine 07/29/2022 Negative  Negative Final   • Propoxyphene Screen 07/29/2022 Negative  Negative Final   • Buprenorphine, Screen, Urine 07/29/2022 Negative  Negative Final   • Methadone 07/29/2022 +POSITIVE+   Final   • Methadone, Quantitative 07/29/2022 >42118  ng/mg creat Final   • EDDP 07/29/2022 >14657  ng/mg creat Final   • Level of Detection:  07/29/2022 Comment   Final    Testing Threshold:  50 ng/mL                                                                       '  This test was developed and its performance characteristics  determined by [facility].  It has not been cleared or approved  by the Food and Drug Administration.       Mental Status Exam  Hygiene:  good  Dress: casual  Attitude: cooperative and agreeable   Motor Activity: appropriate  Eye Contact:  good  Speech: regular rate and rhythm   Mood:  calm and cooperative  Affect:  Appropriate  Thought Processes:  Linear  Thought Content:  Normal  Suicidal Thoughts:  denies  Homicidal Thoughts:  denies  Crisis Safety Plan: Safety plan has been discussed.   Hallucinations: Unknown to clinician.   Reliability: fair  Insight: fair  Judgement: fair  Impulse Control: fair    Recovery/spiritual support group attendance: No     Progress toward goal: Not at goal    Prognosis: Fair with Ongoing Treatment     Self-reported number of days sober: Patient reported 146 during check in.     Patient agreeable to adhere to medication regimen as prescribed and report any side effects. Patient will contact this office, call 911 or present to the nearest emergency room should suicidal or homicidal ideations occur.    Impression/Formulation:  No diagnosis found.    Clinical Maneuvering/Interventions: Therapist utilized a person-centered approach to build rapport with group member. Therapist implemented motivational interviewing techniques to assist client with exploring and resolving ambivalence associated with commitment to change behaviors related to substance use and addiction. Therapist applied cognitive behavioral strategies to facilitate identification of maladaptive patterns of thinking and behavior that contribute to client's risk for continued substance use and relapse. Therapist employed group interaction activities to build rapport among group members, promote sobriety, and emphasize relapse prevention.  Therapist promoted safe nonjudgmental environment by providing group members with unconditional positive regard and encouraging group members to comply with group rules and guidelines. Therapist assisted group member with identifying and implementing healthier coping strategies.      Plan:  Continue [facility] IOP Phase II  Aftercare:  [facility] Phase III  Program Assignments:  Personal recovery plan, relapse prevention plan, attendance of recovery support group meetings, exploration of sponsorship, drug/alcohol screens.     Amor Tabares LCSW  8/22/2022  10:21 EDT

## 2022-08-25 ENCOUNTER — TELEMEDICINE (OUTPATIENT)
Dept: PSYCHIATRY | Facility: CLINIC | Age: 37
End: 2022-08-25

## 2022-08-29 ENCOUNTER — LAB (OUTPATIENT)
Dept: LAB | Facility: HOSPITAL | Age: 37
End: 2022-08-29

## 2022-08-29 ENCOUNTER — TELEMEDICINE (OUTPATIENT)
Dept: PSYCHIATRY | Facility: CLINIC | Age: 37
End: 2022-08-29

## 2022-08-29 DIAGNOSIS — F15.20 METHAMPHETAMINE USE DISORDER, SEVERE, DEPENDENCE: ICD-10-CM

## 2022-08-29 DIAGNOSIS — F11.20 OPIOID USE DISORDER, SEVERE, DEPENDENCE: ICD-10-CM

## 2022-08-29 PROCEDURE — 80307 DRUG TEST PRSMV CHEM ANLYZR: CPT

## 2022-08-29 PROCEDURE — G0480 DRUG TEST DEF 1-7 CLASSES: HCPCS

## 2022-08-30 ENCOUNTER — LAB (OUTPATIENT)
Dept: LAB | Facility: HOSPITAL | Age: 37
End: 2022-08-30

## 2022-08-30 DIAGNOSIS — F11.20 OPIOID USE DISORDER, SEVERE, DEPENDENCE: ICD-10-CM

## 2022-08-30 DIAGNOSIS — F15.20 METHAMPHETAMINE USE DISORDER, SEVERE, DEPENDENCE: ICD-10-CM

## 2022-08-30 LAB
AMPHET+METHAMPHET UR QL: NEGATIVE
AMPHETAMINES UR QL: NEGATIVE
BARBITURATES UR QL SCN: NEGATIVE
BENZODIAZ UR QL SCN: NEGATIVE
BUPRENORPHINE SERPL-MCNC: NEGATIVE NG/ML
CANNABINOIDS SERPL QL: NEGATIVE
COCAINE UR QL: NEGATIVE
ETHANOL UR-MCNC: NEGATIVE %
METHADONE UR QL SCN: POSITIVE
OPIATES UR QL: NEGATIVE
OXYCODONE UR QL SCN: NEGATIVE
PCP UR QL SCN: NEGATIVE
PROPOXYPH UR QL: NEGATIVE
TRICYCLICS UR QL SCN: NEGATIVE

## 2022-08-30 PROCEDURE — G0480 DRUG TEST DEF 1-7 CLASSES: HCPCS

## 2022-08-30 PROCEDURE — 80307 DRUG TEST PRSMV CHEM ANLYZR: CPT

## 2022-08-31 LAB — ETHANOL UR-MCNC: NEGATIVE %

## 2022-09-01 ENCOUNTER — TELEMEDICINE (OUTPATIENT)
Dept: PSYCHIATRY | Facility: CLINIC | Age: 37
End: 2022-09-01

## 2022-09-01 NOTE — PROGRESS NOTES
"CD IOP GROUP     Date: 08/25/2022  Name: Ada Vogt    Time In: 1530   Time Out: 1630    This provider is located at [facility] located at [address] [place] [address], [address] [place] [id number].  The Patient is seen remotely at his/her home, using Zoom. Patient is being seen via telehealth and confirm that they are in a secure environment for this session. The patient's condition being diagnosed/treated is appropriate for telemedicine. The provider identified himself as well as his credentials. The patient gave consent to be seen remotely, and when consent is given they understand that the consent allows for patient identifiable information to be sent to a third party as needed. They may refuse to be seen remotely at any time. The electronic data is encrypted and password protected, and the patient has been advised of the potential risks to privacy not withstanding such measures.      Number of participants: 9    IOP GROUP NOTE     Data: 1 hour IOP group therapy session (Check-ins, Coping Skills, Relapse Prevention)     Check Ins: Therapist continued facilitation of rapport building strategies between group members. Therapist asked that each patient check in with home life and recovery efforts and identify triggers, cravings, and high risk situations that arise between group sessions. Therapist provided empathy and support during group session.     Session Content/Coping Skills: Check ins completed by group members. Clinician processed stressors with group members. Clinician discussed relapse drift with group members and importance of being aware of relapse drift. Clinician assigned relapse prevention plans as homework for group members.      Response: Patient attended class via Zoom. Patient participated in verbal completion of check in form. Patient during check in answered no to question \"currently or since last group meeting have you had any suicidal thoughts or plan or intent to hurt yourself”, and " "patient also answered no to question of \"currently or since your last group meeting, have you had any homicidal thoughts or plan or intent to hurt others\". Patient answered no to all other questions during check in. Patient participated in group discussions.      Personal Assessment 0-10 Scale (10 worst)    Anxiety:  6   Depression:  2   Cravings: 0     Assessment:     ..  Admission on 08/21/2022, Discharged on 08/21/2022   Component Date Value Ref Range Status   • Glucose 08/21/2022 82  65 - 99 mg/dL Final   • BUN 08/21/2022 5 (A) 6 - 20 mg/dL Final   • Creatinine 08/21/2022 0.49 (A) 0.57 - 1.00 mg/dL Final   • Sodium 08/21/2022 137  136 - 145 mmol/L Final   • Potassium 08/21/2022 3.5  3.5 - 5.2 mmol/L Final   • Chloride 08/21/2022 99  98 - 107 mmol/L Final   • CO2 08/21/2022 28.8  22.0 - 29.0 mmol/L Final   • Calcium 08/21/2022 8.7  8.6 - 10.5 mg/dL Final   • Total Protein 08/21/2022 6.2  6.0 - 8.5 g/dL Final   • Albumin 08/21/2022 3.60  3.50 - 5.20 g/dL Final   • ALT (SGPT) 08/21/2022 20  1 - 33 U/L Final   • AST (SGOT) 08/21/2022 24  1 - 32 U/L Final   • Alkaline Phosphatase 08/21/2022 102  39 - 117 U/L Final   • Total Bilirubin 08/21/2022 0.4  0.0 - 1.2 mg/dL Final   • Globulin 08/21/2022 2.6  gm/dL Final   • A/G Ratio 08/21/2022 1.4  g/dL Final   • BUN/Creatinine Ratio 08/21/2022 10.2  7.0 - 25.0 Final   • Anion Gap 08/21/2022 9.2  5.0 - 15.0 mmol/L Final   • eGFR 08/21/2022 126.2  >60.0 mL/min/1.73 Final    National Kidney Foundation and American Society of Nephrology (ASN) Task Force recommended calculation based on the Chronic Kidney Disease Epidemiology Collaboration (CKD-EPI) equation refit without adjustment for race.   • Procalcitonin 08/21/2022 2.30 (A) 0.00 - 0.25 ng/mL Final   • Lactate 08/21/2022 1.2  0.5 - 2.0 mmol/L Final   • Ferritin 08/21/2022 220.40 (A) 13.00 - 150.00 ng/mL Final   • Fibrinogen 08/21/2022 705 (A) 209 - 518 mg/dL Final   • C-Reactive Protein 08/21/2022 16.65 (A) 0.00 - 0.50 " mg/dL Final   • Troponin T 08/21/2022 <0.010  0.000 - 0.030 ng/mL Final   • WBC 08/21/2022 13.53 (A) 3.40 - 10.80 10*3/mm3 Final   • RBC 08/21/2022 4.29  3.77 - 5.28 10*6/mm3 Final   • Hemoglobin 08/21/2022 12.4  12.0 - 15.9 g/dL Final   • Hematocrit 08/21/2022 36.7  34.0 - 46.6 % Final   • MCV 08/21/2022 85.5  79.0 - 97.0 fL Final   • MCH 08/21/2022 28.9  26.6 - 33.0 pg Final   • MCHC 08/21/2022 33.8  31.5 - 35.7 g/dL Final   • RDW 08/21/2022 13.1  12.3 - 15.4 % Final   • RDW-SD 08/21/2022 40.4  37.0 - 54.0 fl Final   • MPV 08/21/2022 9.1  6.0 - 12.0 fL Final   • Platelets 08/21/2022 363  140 - 450 10*3/mm3 Final   • Neutrophil % 08/21/2022 83.9 (A) 42.7 - 76.0 % Final   • Lymphocyte % 08/21/2022 10.0 (A) 19.6 - 45.3 % Final   • Monocyte % 08/21/2022 5.2  5.0 - 12.0 % Final   • Eosinophil % 08/21/2022 0.4  0.3 - 6.2 % Final   • Basophil % 08/21/2022 0.3  0.0 - 1.5 % Final   • Immature Grans % 08/21/2022 0.2  0.0 - 0.5 % Final   • Neutrophils, Absolute 08/21/2022 11.35 (A) 1.70 - 7.00 10*3/mm3 Final   • Lymphocytes, Absolute 08/21/2022 1.35  0.70 - 3.10 10*3/mm3 Final   • Monocytes, Absolute 08/21/2022 0.71  0.10 - 0.90 10*3/mm3 Final   • Eosinophils, Absolute 08/21/2022 0.05  0.00 - 0.40 10*3/mm3 Final   • Basophils, Absolute 08/21/2022 0.04  0.00 - 0.20 10*3/mm3 Final   • Immature Grans, Absolute 08/21/2022 0.03  0.00 - 0.05 10*3/mm3 Final   • nRBC 08/21/2022 0.0  0.0 - 0.2 /100 WBC Final   • COVID19 08/21/2022 Not Detected  Not Detected - Ref. Range Final   Lab on 08/19/2022   Component Date Value Ref Range Status   • Ethanol, Urine 08/19/2022 Negative  Cutoff=0.020 % Final   • THC, Screen, Urine 08/19/2022 Negative  Negative Final   • Phencyclidine (PCP), Urine 08/19/2022 Negative  Negative Final   • Cocaine Screen, Urine 08/19/2022 Negative  Negative Final   • Methamphetamine, Ur 08/19/2022 Negative  Negative Final   • Opiate Screen 08/19/2022 Negative  Negative Final   • Amphetamine Screen, Urine 08/19/2022  Negative  Negative Final   • Benzodiazepine Screen, Urine 08/19/2022 Negative  Negative Final   • Tricyclic Antidepressants Screen 08/19/2022 Negative  Negative Final   • Methadone Screen, Urine 08/19/2022 Positive (A) Negative Final   • Barbiturates Screen, Urine 08/19/2022 Negative  Negative Final   • Oxycodone Screen, Urine 08/19/2022 Negative  Negative Final   • Propoxyphene Screen 08/19/2022 Negative  Negative Final   • Buprenorphine, Screen, Urine 08/19/2022 Negative  Negative Final   Lab on 08/10/2022   Component Date Value Ref Range Status   • Ethanol, Urine 08/10/2022 Negative  Cutoff=0.020 % Final   • THC, Screen, Urine 08/10/2022 Negative  Negative Final   • Phencyclidine (PCP), Urine 08/10/2022 Negative  Negative Final   • Cocaine Screen, Urine 08/10/2022 Negative  Negative Final   • Methamphetamine, Ur 08/10/2022 Negative  Negative Final   • Opiate Screen 08/10/2022 Negative  Negative Final   • Amphetamine Screen, Urine 08/10/2022 Negative  Negative Final   • Benzodiazepine Screen, Urine 08/10/2022 Negative  Negative Final   • Tricyclic Antidepressants Screen 08/10/2022 Negative  Negative Final   • Methadone Screen, Urine 08/10/2022 Positive (A) Negative Final   • Barbiturates Screen, Urine 08/10/2022 Negative  Negative Final   • Oxycodone Screen, Urine 08/10/2022 Negative  Negative Final   • Propoxyphene Screen 08/10/2022 Negative  Negative Final   • Buprenorphine, Screen, Urine 08/10/2022 Negative  Negative Final   • Methadone 08/19/2022 +POSITIVE+   Final   • Methadone, Quantitative 08/19/2022 61891  ng/mg creat Final   • EDDP 08/19/2022 02147  ng/mg creat Final   • Level of Detection: 08/19/2022 Comment   Final    Testing Threshold:  50 ng/mL                                                                       '  This test was developed and its performance characteristics  determined by [facility].  It has not been cleared or approved  by the Food and Drug Administration.       Mental Status  Exam  Hygiene:  good  Dress: casual  Attitude: cooperative and agreeable   Motor Activity: appropriate  Eye Contact:  good  Speech: regular rate and rhythm   Mood:  calm and cooperative  Affect:  Appropriate  Thought Processes:  Linear  Thought Content:  Normal  Suicidal Thoughts:  denies  Homicidal Thoughts:  denies  Crisis Safety Plan: Safety plan has been discussed.   Hallucinations: Unknown to clinician.   Reliability: fair  Insight: fair  Judgement: fair  Impulse Control: fair    Recovery/spiritual support group attendance: No     Progress toward goal: Not at goal    Prognosis: Fair with Ongoing Treatment     Self-reported number of days sober: Patient reported 5 months, 153 days during check in.     Patient agreeable to adhere to medication regimen as prescribed and report any side effects. Patient will contact this office, call 911 or present to the nearest emergency room should suicidal or homicidal ideations occur.    Impression/Formulation:  No diagnosis found.    Clinical Maneuvering/Interventions: Therapist utilized a person-centered approach to build rapport with group member. Therapist implemented motivational interviewing techniques to assist client with exploring and resolving ambivalence associated with commitment to change behaviors related to substance use and addiction. Therapist applied cognitive behavioral strategies to facilitate identification of maladaptive patterns of thinking and behavior that contribute to client's risk for continued substance use and relapse. Therapist employed group interaction activities to build rapport among group members, promote sobriety, and emphasize relapse prevention. Therapist promoted safe nonjudgmental environment by providing group members with unconditional positive regard and encouraging group members to comply with group rules and guidelines. Therapist assisted group member with identifying and implementing healthier coping strategies.      Plan:   Continue [facility] IOP Phase II   Aftercare:  [facility] Phase III  Program Assignments:  Personal recovery plan, relapse prevention plan, attendance of recovery support group meetings, exploration of sponsorship, drug/alcohol screens.     Amor Tabares LCSW  9/1/2022  10:16 EDT

## 2022-09-02 LAB
EDDP/CREAT UR: NORMAL NG/MG CREAT
LEVEL OF DETECTION:: NORMAL
METHADONE UR QL CFM: NORMAL
METHADONE/CREAT UR: 4928 NG/MG CREAT

## 2022-09-05 ENCOUNTER — TELEMEDICINE (OUTPATIENT)
Dept: PSYCHIATRY | Facility: CLINIC | Age: 37
End: 2022-09-05

## 2022-09-05 NOTE — PROGRESS NOTES
CD IOP GROUP     Date: 08/29/2022  Name: Ada Vogt    Time In: 1530  Time Out: 1630    This provider is located at [facility] located at [address] [place] [address], [address] [place] [id number].  The Patient is seen remotely at his/her home, using Zoom. Patient is being seen via telehealth and confirm that they are in a secure environment for this session. The patient's condition being diagnosed/treated is appropriate for telemedicine. The provider identified himself as well as his credentials. The patient gave consent to be seen remotely, and when consent is given they understand that the consent allows for patient identifiable information to be sent to a third party as needed. They may refuse to be seen remotely at any time. The electronic data is encrypted and password protected, and the patient has been advised of the potential risks to privacy not withstanding such measures.      Number of participants: 10    IOP GROUP NOTE     Data: 1 hour IOP group therapy session (Check-ins, Coping Skills, Relapse Prevention)     Check Ins: Therapist continued facilitation of rapport building strategies between group members. Therapist asked that each patient check in with home life and recovery efforts and identify triggers, cravings, and high risk situations that arise between group sessions. Therapist provided empathy and support during group session.     Session Content/Coping Skills: Clinician processed group stressors with group members. Check in’s completed by group members. Clinician discussed with group members being aware of thoughts and not disqualifying the positives. Clinician invited group members to attend Overdose Awareness Day.     Response: Patient attended class via Zoom. Patient participated in verbal completion of check in form.   Patient during check in denied suicidal or homicidal thoughts. Patient participated in group discussions.      Personal Assessment 0-10 Scale (10 worst)    Anxiety:   10   Depression:  7   Cravings: 5     Assessment:     ..  Lab on 08/29/2022   Component Date Value Ref Range Status   • Ethanol, Urine 08/29/2022 Negative  Cutoff=0.020 % Final   • THC, Screen, Urine 08/29/2022 Negative  Negative Final   • Phencyclidine (PCP), Urine 08/29/2022 Negative  Negative Final   • Cocaine Screen, Urine 08/29/2022 Negative  Negative Final   • Methamphetamine, Ur 08/29/2022 Negative  Negative Final   • Opiate Screen 08/29/2022 Negative  Negative Final   • Amphetamine Screen, Urine 08/29/2022 Negative  Negative Final   • Benzodiazepine Screen, Urine 08/29/2022 Negative  Negative Final   • Tricyclic Antidepressants Screen 08/29/2022 Negative  Negative Final   • Methadone Screen, Urine 08/29/2022 Positive (A) Negative Final   • Barbiturates Screen, Urine 08/29/2022 Negative  Negative Final   • Oxycodone Screen, Urine 08/29/2022 Negative  Negative Final   • Propoxyphene Screen 08/29/2022 Negative  Negative Final   • Buprenorphine, Screen, Urine 08/29/2022 Negative  Negative Final   • Methadone 08/29/2022 +POSITIVE+   Final   • Methadone, Quantitative 08/29/2022 4928  ng/mg creat Final   • EDDP 08/29/2022 78911  ng/mg creat Final   • Level of Detection: 08/29/2022 Comment   Final    Testing Threshold:  50 ng/mL                                                                       '  This test was developed and its performance characteristics  determined by [facility].  It has not been cleared or approved  by the Food and Drug Administration.   Admission on 08/21/2022, Discharged on 08/21/2022   Component Date Value Ref Range Status   • Glucose 08/21/2022 82  65 - 99 mg/dL Final   • BUN 08/21/2022 5 (A) 6 - 20 mg/dL Final   • Creatinine 08/21/2022 0.49 (A) 0.57 - 1.00 mg/dL Final   • Sodium 08/21/2022 137  136 - 145 mmol/L Final   • Potassium 08/21/2022 3.5  3.5 - 5.2 mmol/L Final   • Chloride 08/21/2022 99  98 - 107 mmol/L Final   • CO2 08/21/2022 28.8  22.0 - 29.0 mmol/L Final   • Calcium 08/21/2022  8.7  8.6 - 10.5 mg/dL Final   • Total Protein 08/21/2022 6.2  6.0 - 8.5 g/dL Final   • Albumin 08/21/2022 3.60  3.50 - 5.20 g/dL Final   • ALT (SGPT) 08/21/2022 20  1 - 33 U/L Final   • AST (SGOT) 08/21/2022 24  1 - 32 U/L Final   • Alkaline Phosphatase 08/21/2022 102  39 - 117 U/L Final   • Total Bilirubin 08/21/2022 0.4  0.0 - 1.2 mg/dL Final   • Globulin 08/21/2022 2.6  gm/dL Final   • A/G Ratio 08/21/2022 1.4  g/dL Final   • BUN/Creatinine Ratio 08/21/2022 10.2  7.0 - 25.0 Final   • Anion Gap 08/21/2022 9.2  5.0 - 15.0 mmol/L Final   • eGFR 08/21/2022 126.2  >60.0 mL/min/1.73 Final    National Kidney Foundation and American Society of Nephrology (ASN) Task Force recommended calculation based on the Chronic Kidney Disease Epidemiology Collaboration (CKD-EPI) equation refit without adjustment for race.   • Procalcitonin 08/21/2022 2.30 (A) 0.00 - 0.25 ng/mL Final   • Lactate 08/21/2022 1.2  0.5 - 2.0 mmol/L Final   • Ferritin 08/21/2022 220.40 (A) 13.00 - 150.00 ng/mL Final   • Fibrinogen 08/21/2022 705 (A) 209 - 518 mg/dL Final   • C-Reactive Protein 08/21/2022 16.65 (A) 0.00 - 0.50 mg/dL Final   • Troponin T 08/21/2022 <0.010  0.000 - 0.030 ng/mL Final   • WBC 08/21/2022 13.53 (A) 3.40 - 10.80 10*3/mm3 Final   • RBC 08/21/2022 4.29  3.77 - 5.28 10*6/mm3 Final   • Hemoglobin 08/21/2022 12.4  12.0 - 15.9 g/dL Final   • Hematocrit 08/21/2022 36.7  34.0 - 46.6 % Final   • MCV 08/21/2022 85.5  79.0 - 97.0 fL Final   • MCH 08/21/2022 28.9  26.6 - 33.0 pg Final   • MCHC 08/21/2022 33.8  31.5 - 35.7 g/dL Final   • RDW 08/21/2022 13.1  12.3 - 15.4 % Final   • RDW-SD 08/21/2022 40.4  37.0 - 54.0 fl Final   • MPV 08/21/2022 9.1  6.0 - 12.0 fL Final   • Platelets 08/21/2022 363  140 - 450 10*3/mm3 Final   • Neutrophil % 08/21/2022 83.9 (A) 42.7 - 76.0 % Final   • Lymphocyte % 08/21/2022 10.0 (A) 19.6 - 45.3 % Final   • Monocyte % 08/21/2022 5.2  5.0 - 12.0 % Final   • Eosinophil % 08/21/2022 0.4  0.3 - 6.2 % Final   •  Basophil % 08/21/2022 0.3  0.0 - 1.5 % Final   • Immature Grans % 08/21/2022 0.2  0.0 - 0.5 % Final   • Neutrophils, Absolute 08/21/2022 11.35 (A) 1.70 - 7.00 10*3/mm3 Final   • Lymphocytes, Absolute 08/21/2022 1.35  0.70 - 3.10 10*3/mm3 Final   • Monocytes, Absolute 08/21/2022 0.71  0.10 - 0.90 10*3/mm3 Final   • Eosinophils, Absolute 08/21/2022 0.05  0.00 - 0.40 10*3/mm3 Final   • Basophils, Absolute 08/21/2022 0.04  0.00 - 0.20 10*3/mm3 Final   • Immature Grans, Absolute 08/21/2022 0.03  0.00 - 0.05 10*3/mm3 Final   • nRBC 08/21/2022 0.0  0.0 - 0.2 /100 WBC Final   • COVID19 08/21/2022 Not Detected  Not Detected - Ref. Range Final   Lab on 08/19/2022   Component Date Value Ref Range Status   • Ethanol, Urine 08/19/2022 Negative  Cutoff=0.020 % Final   • THC, Screen, Urine 08/19/2022 Negative  Negative Final   • Phencyclidine (PCP), Urine 08/19/2022 Negative  Negative Final   • Cocaine Screen, Urine 08/19/2022 Negative  Negative Final   • Methamphetamine, Ur 08/19/2022 Negative  Negative Final   • Opiate Screen 08/19/2022 Negative  Negative Final   • Amphetamine Screen, Urine 08/19/2022 Negative  Negative Final   • Benzodiazepine Screen, Urine 08/19/2022 Negative  Negative Final   • Tricyclic Antidepressants Screen 08/19/2022 Negative  Negative Final   • Methadone Screen, Urine 08/19/2022 Positive (A) Negative Final   • Barbiturates Screen, Urine 08/19/2022 Negative  Negative Final   • Oxycodone Screen, Urine 08/19/2022 Negative  Negative Final   • Propoxyphene Screen 08/19/2022 Negative  Negative Final   • Buprenorphine, Screen, Urine 08/19/2022 Negative  Negative Final   Lab on 08/10/2022   Component Date Value Ref Range Status   • Ethanol, Urine 08/10/2022 Negative  Cutoff=0.020 % Final   • THC, Screen, Urine 08/10/2022 Negative  Negative Final   • Phencyclidine (PCP), Urine 08/10/2022 Negative  Negative Final   • Cocaine Screen, Urine 08/10/2022 Negative  Negative Final   • Methamphetamine, Ur 08/10/2022  Negative  Negative Final   • Opiate Screen 08/10/2022 Negative  Negative Final   • Amphetamine Screen, Urine 08/10/2022 Negative  Negative Final   • Benzodiazepine Screen, Urine 08/10/2022 Negative  Negative Final   • Tricyclic Antidepressants Screen 08/10/2022 Negative  Negative Final   • Methadone Screen, Urine 08/10/2022 Positive (A) Negative Final   • Barbiturates Screen, Urine 08/10/2022 Negative  Negative Final   • Oxycodone Screen, Urine 08/10/2022 Negative  Negative Final   • Propoxyphene Screen 08/10/2022 Negative  Negative Final   • Buprenorphine, Screen, Urine 08/10/2022 Negative  Negative Final   • Methadone 08/19/2022 +POSITIVE+   Final   • Methadone, Quantitative 08/19/2022 64108  ng/mg creat Final   • EDDP 08/19/2022 82071  ng/mg creat Final   • Level of Detection: 08/19/2022 Comment   Final    Testing Threshold:  50 ng/mL                                                                       '  This test was developed and its performance characteristics  determined by [employer].  It has not been cleared or approved  by the Food and Drug Administration.       Mental Status Exam  Hygiene:  good  Dress: casual  Attitude: cooperative and agreeable   Motor Activity: appropriate  Eye Contact:  good  Speech: regular rate and rhythm   Mood:  calm and cooperative  Affect:  Appropriate  Thought Processes:  Linear  Thought Content:  Normal  Suicidal Thoughts:  denies  Homicidal Thoughts:  denies  Crisis Safety Plan: Safety plan has been discussed.   Hallucinations: Unknown to clinician.   Reliability: fair  Insight: fair  Judgement: fair  Impulse Control: fair    Recovery/spiritual support group attendance: No     Progress toward goal: Not at goal    Prognosis: Fair with Ongoing Treatment     Self-reported number of days sober: Patient reported 157 during check in.     Patient will contact this office, call 911 or present to the nearest emergency room should suicidal or homicidal ideations  occur.    Impression/Formulation:  No diagnosis found.    Clinical Maneuvering/Interventions: Therapist utilized a person-centered approach to build rapport with group member. Therapist implemented motivational interviewing techniques to assist client with exploring and resolving ambivalence associated with commitment to change behaviors related to substance use and addiction. Therapist applied cognitive behavioral strategies to facilitate identification of maladaptive patterns of thinking and behavior that contribute to client's risk for continued substance use and relapse. Therapist employed group interaction activities to build rapport among group members, promote sobriety, and emphasize relapse prevention. Therapist promoted safe nonjudgmental environment by providing group members with unconditional positive regard and encouraging group members to comply with group rules and guidelines. Therapist assisted group member with identifying and implementing healthier coping strategies.      Plan:  Continue [facility] IOP Phase II  Aftercare:  [facility] Phase III  Program Assignments:  Personal recovery plan, relapse prevention plan, attendance of recovery support group meetings, exploration of sponsorship, drug/alcohol screens.     Amor Tabares LCSW  9/5/2022  14:47 EDT

## 2022-09-07 ENCOUNTER — LAB (OUTPATIENT)
Dept: LAB | Facility: HOSPITAL | Age: 37
End: 2022-09-07

## 2022-09-07 DIAGNOSIS — F11.20 OPIOID USE DISORDER, SEVERE, DEPENDENCE: Primary | ICD-10-CM

## 2022-09-07 DIAGNOSIS — F15.20 METHAMPHETAMINE USE DISORDER, SEVERE, DEPENDENCE: ICD-10-CM

## 2022-09-07 DIAGNOSIS — R53.83 FATIGUE, UNSPECIFIED TYPE: ICD-10-CM

## 2022-09-07 LAB
AMPHET+METHAMPHET UR QL: NEGATIVE
AMPHETAMINES UR QL: NEGATIVE
BARBITURATES UR QL SCN: NEGATIVE
BENZODIAZ UR QL SCN: NEGATIVE
BUPRENORPHINE SERPL-MCNC: NEGATIVE NG/ML
CANNABINOIDS SERPL QL: NEGATIVE
COCAINE UR QL: NEGATIVE
EDDP/CREAT UR: NORMAL NG/MG CREAT
LEVEL OF DETECTION:: NORMAL
METHADONE UR QL CFM: NORMAL
METHADONE UR QL SCN: POSITIVE
METHADONE/CREAT UR: NORMAL NG/MG CREAT
OPIATES UR QL: NEGATIVE
OXYCODONE UR QL SCN: NEGATIVE
PCP UR QL SCN: NEGATIVE
PROPOXYPH UR QL: NEGATIVE
TRICYCLICS UR QL SCN: NEGATIVE

## 2022-09-07 PROCEDURE — G0480 DRUG TEST DEF 1-7 CLASSES: HCPCS

## 2022-09-07 PROCEDURE — 80307 DRUG TEST PRSMV CHEM ANLYZR: CPT

## 2022-09-08 ENCOUNTER — TELEMEDICINE (OUTPATIENT)
Dept: PSYCHIATRY | Facility: CLINIC | Age: 37
End: 2022-09-08

## 2022-09-08 LAB — ETHANOL UR-MCNC: NEGATIVE %

## 2022-09-12 ENCOUNTER — TELEMEDICINE (OUTPATIENT)
Dept: PSYCHIATRY | Facility: CLINIC | Age: 37
End: 2022-09-12

## 2022-09-12 NOTE — PROGRESS NOTES
CD IOP GROUP     Date: 09/01/2022  Name: Ada Vogt    Time In: 1530   Time Out: 1630    This provider is located at [facility] located at [address] [place] [address], [address] [place] [id number].  The Patient is seen remotely at his/her home, using Zoom. Patient is being seen via telehealth and confirm that they are in a secure environment for this session. The patient's condition being diagnosed/treated is appropriate for telemedicine. The provider identified himself as well as his credentials. The patient gave consent to be seen remotely, and when consent is given they understand that the consent allows for patient identifiable information to be sent to a third party as needed. They may refuse to be seen remotely at any time. The electronic data is encrypted and password protected, and the patient has been advised of the potential risks to privacy not withstanding such measures.      Number of participants: 9    IOP GROUP NOTE     Data: 1 hour IOP group therapy session (Check-ins, Coping Skills, Relapse Prevention)     Check Ins: Therapist continued facilitation of rapport building strategies between group members. Therapist asked that each patient check in with home life and recovery efforts and identify triggers, cravings, and high risk situations that arise between group sessions. Therapist provided empathy and support during group session.     Session Content/Coping Skills: Check ins completed by group members. Clinician explored aftercare options with group members. Clinician discussed therapy options with group members and clinician answered questions group members had regarding therapy. MAT provider, Lauryn, attended group and answered MAT questions.     Response: Patient attended class via Zoom. Patient participated in verbal completion of check in form.   Patient during check in denied suicidal or homicidal thoughts. Patient participated in group discussions.      Personal Assessment 0-10 Scale  (10 worst)    Anxiety:  4   Depression:  2   Cravings: 0     Assessment:     ..  Lab on 08/30/2022   Component Date Value Ref Range Status   • Ethanol, Urine 08/30/2022 Negative  Cutoff=0.020 % Final   • THC, Screen, Urine 08/30/2022 Negative  Negative Final   • Phencyclidine (PCP), Urine 08/30/2022 Negative  Negative Final   • Cocaine Screen, Urine 08/30/2022 Negative  Negative Final   • Methamphetamine, Ur 08/30/2022 Negative  Negative Final   • Opiate Screen 08/30/2022 Negative  Negative Final   • Amphetamine Screen, Urine 08/30/2022 Negative  Negative Final   • Benzodiazepine Screen, Urine 08/30/2022 Negative  Negative Final   • Tricyclic Antidepressants Screen 08/30/2022 Negative  Negative Final   • Methadone Screen, Urine 08/30/2022 Positive (A) Negative Final   • Barbiturates Screen, Urine 08/30/2022 Negative  Negative Final   • Oxycodone Screen, Urine 08/30/2022 Negative  Negative Final   • Propoxyphene Screen 08/30/2022 Negative  Negative Final   • Buprenorphine, Screen, Urine 08/30/2022 Negative  Negative Final   • Methadone 08/30/2022 +POSITIVE+   Final   • Methadone, Quantitative 08/30/2022 69937  ng/mg creat Final   • EDDP 08/30/2022 72412  ng/mg creat Final   • Level of Detection: 08/30/2022 Comment   Final    Testing Threshold:  50 ng/mL                                                                       '  This test was developed and its performance characteristics  determined by [facility].  It has not been cleared or approved  by the Food and Drug Administration.   Lab on 08/29/2022   Component Date Value Ref Range Status   • Ethanol, Urine 08/29/2022 Negative  Cutoff=0.020 % Final   • THC, Screen, Urine 08/29/2022 Negative  Negative Final   • Phencyclidine (PCP), Urine 08/29/2022 Negative  Negative Final   • Cocaine Screen, Urine 08/29/2022 Negative  Negative Final   • Methamphetamine, Ur 08/29/2022 Negative  Negative Final   • Opiate Screen 08/29/2022 Negative  Negative Final   • Amphetamine  Screen, Urine 08/29/2022 Negative  Negative Final   • Benzodiazepine Screen, Urine 08/29/2022 Negative  Negative Final   • Tricyclic Antidepressants Screen 08/29/2022 Negative  Negative Final   • Methadone Screen, Urine 08/29/2022 Positive (A) Negative Final   • Barbiturates Screen, Urine 08/29/2022 Negative  Negative Final   • Oxycodone Screen, Urine 08/29/2022 Negative  Negative Final   • Propoxyphene Screen 08/29/2022 Negative  Negative Final   • Buprenorphine, Screen, Urine 08/29/2022 Negative  Negative Final   • Methadone 08/29/2022 +POSITIVE+   Final   • Methadone, Quantitative 08/29/2022 4928  ng/mg creat Final   • EDDP 08/29/2022 65821  ng/mg creat Final   • Level of Detection: 08/29/2022 Comment   Final    Testing Threshold:  50 ng/mL                                                                       '  This test was developed and its performance characteristics  determined by [facility].  It has not been cleared or approved  by the Food and Drug Administration.   Admission on 08/21/2022, Discharged on 08/21/2022   Component Date Value Ref Range Status   • Glucose 08/21/2022 82  65 - 99 mg/dL Final   • BUN 08/21/2022 5 (A) 6 - 20 mg/dL Final   • Creatinine 08/21/2022 0.49 (A) 0.57 - 1.00 mg/dL Final   • Sodium 08/21/2022 137  136 - 145 mmol/L Final   • Potassium 08/21/2022 3.5  3.5 - 5.2 mmol/L Final   • Chloride 08/21/2022 99  98 - 107 mmol/L Final   • CO2 08/21/2022 28.8  22.0 - 29.0 mmol/L Final   • Calcium 08/21/2022 8.7  8.6 - 10.5 mg/dL Final   • Total Protein 08/21/2022 6.2  6.0 - 8.5 g/dL Final   • Albumin 08/21/2022 3.60  3.50 - 5.20 g/dL Final   • ALT (SGPT) 08/21/2022 20  1 - 33 U/L Final   • AST (SGOT) 08/21/2022 24  1 - 32 U/L Final   • Alkaline Phosphatase 08/21/2022 102  39 - 117 U/L Final   • Total Bilirubin 08/21/2022 0.4  0.0 - 1.2 mg/dL Final   • Globulin 08/21/2022 2.6  gm/dL Final   • A/G Ratio 08/21/2022 1.4  g/dL Final   • BUN/Creatinine Ratio 08/21/2022 10.2  7.0 - 25.0 Final   •  Anion Gap 08/21/2022 9.2  5.0 - 15.0 mmol/L Final   • eGFR 08/21/2022 126.2  >60.0 mL/min/1.73 Final    National Kidney Foundation and American Society of Nephrology (ASN) Task Force recommended calculation based on the Chronic Kidney Disease Epidemiology Collaboration (CKD-EPI) equation refit without adjustment for race.   • Procalcitonin 08/21/2022 2.30 (A) 0.00 - 0.25 ng/mL Final   • Lactate 08/21/2022 1.2  0.5 - 2.0 mmol/L Final   • Ferritin 08/21/2022 220.40 (A) 13.00 - 150.00 ng/mL Final   • Fibrinogen 08/21/2022 705 (A) 209 - 518 mg/dL Final   • C-Reactive Protein 08/21/2022 16.65 (A) 0.00 - 0.50 mg/dL Final   • Troponin T 08/21/2022 <0.010  0.000 - 0.030 ng/mL Final   • WBC 08/21/2022 13.53 (A) 3.40 - 10.80 10*3/mm3 Final   • RBC 08/21/2022 4.29  3.77 - 5.28 10*6/mm3 Final   • Hemoglobin 08/21/2022 12.4  12.0 - 15.9 g/dL Final   • Hematocrit 08/21/2022 36.7  34.0 - 46.6 % Final   • MCV 08/21/2022 85.5  79.0 - 97.0 fL Final   • MCH 08/21/2022 28.9  26.6 - 33.0 pg Final   • MCHC 08/21/2022 33.8  31.5 - 35.7 g/dL Final   • RDW 08/21/2022 13.1  12.3 - 15.4 % Final   • RDW-SD 08/21/2022 40.4  37.0 - 54.0 fl Final   • MPV 08/21/2022 9.1  6.0 - 12.0 fL Final   • Platelets 08/21/2022 363  140 - 450 10*3/mm3 Final   • Neutrophil % 08/21/2022 83.9 (A) 42.7 - 76.0 % Final   • Lymphocyte % 08/21/2022 10.0 (A) 19.6 - 45.3 % Final   • Monocyte % 08/21/2022 5.2  5.0 - 12.0 % Final   • Eosinophil % 08/21/2022 0.4  0.3 - 6.2 % Final   • Basophil % 08/21/2022 0.3  0.0 - 1.5 % Final   • Immature Grans % 08/21/2022 0.2  0.0 - 0.5 % Final   • Neutrophils, Absolute 08/21/2022 11.35 (A) 1.70 - 7.00 10*3/mm3 Final   • Lymphocytes, Absolute 08/21/2022 1.35  0.70 - 3.10 10*3/mm3 Final   • Monocytes, Absolute 08/21/2022 0.71  0.10 - 0.90 10*3/mm3 Final   • Eosinophils, Absolute 08/21/2022 0.05  0.00 - 0.40 10*3/mm3 Final   • Basophils, Absolute 08/21/2022 0.04  0.00 - 0.20 10*3/mm3 Final   • Immature Grans, Absolute 08/21/2022 0.03   0.00 - 0.05 10*3/mm3 Final   • nRBC 08/21/2022 0.0  0.0 - 0.2 /100 WBC Final   • COVID19 08/21/2022 Not Detected  Not Detected - Ref. Range Final   Lab on 08/19/2022   Component Date Value Ref Range Status   • Ethanol, Urine 08/19/2022 Negative  Cutoff=0.020 % Final   • THC, Screen, Urine 08/19/2022 Negative  Negative Final   • Phencyclidine (PCP), Urine 08/19/2022 Negative  Negative Final   • Cocaine Screen, Urine 08/19/2022 Negative  Negative Final   • Methamphetamine, Ur 08/19/2022 Negative  Negative Final   • Opiate Screen 08/19/2022 Negative  Negative Final   • Amphetamine Screen, Urine 08/19/2022 Negative  Negative Final   • Benzodiazepine Screen, Urine 08/19/2022 Negative  Negative Final   • Tricyclic Antidepressants Screen 08/19/2022 Negative  Negative Final   • Methadone Screen, Urine 08/19/2022 Positive (A) Negative Final   • Barbiturates Screen, Urine 08/19/2022 Negative  Negative Final   • Oxycodone Screen, Urine 08/19/2022 Negative  Negative Final   • Propoxyphene Screen 08/19/2022 Negative  Negative Final   • Buprenorphine, Screen, Urine 08/19/2022 Negative  Negative Final       Mental Status Exam  Hygiene:  good  Dress: casual  Attitude: cooperative and agreeable   Motor Activity: appropriate  Eye Contact:  good  Speech: regular rate and rhythm   Mood:  calm and cooperative  Affect:  Appropriate  Thought Processes:  Linear  Thought Content:  Normal  Suicidal Thoughts:  denies  Homicidal Thoughts:  denies  Crisis Safety Plan: Safety plan has been discussed.   Hallucinations: Unknown to clinician.   Reliability: fair  Insight: fair  Judgement: fair  Impulse Control: fair    Recovery/spiritual support group attendance: No     Progress toward goal: Not at goal    Prognosis: Fair with Ongoing Treatment     Self-reported number of days sober: Patient reported 160 days during check in.     Patient will contact this office, call 911 or present to the nearest emergency room should suicidal or homicidal  ideations occur.    Impression/Formulation:  No diagnosis found.    Clinical Maneuvering/Interventions: Therapist utilized a person-centered approach to build rapport with group member. Therapist implemented motivational interviewing techniques to assist client with exploring and resolving ambivalence associated with commitment to change behaviors related to substance use and addiction. Therapist applied cognitive behavioral strategies to facilitate identification of maladaptive patterns of thinking and behavior that contribute to client's risk for continued substance use and relapse. Therapist employed group interaction activities to build rapport among group members, promote sobriety, and emphasize relapse prevention. Therapist promoted safe nonjudgmental environment by providing group members with unconditional positive regard and encouraging group members to comply with group rules and guidelines. Therapist assisted group member with identifying and implementing healthier coping strategies.      Plan:  Continue [facility] IOP Phase II  Aftercare:  [facility] Phase III  Program Assignments:  Personal recovery plan, relapse prevention plan, attendance of recovery support group meetings, exploration of sponsorship, drug/alcohol screens.     Amor Tabares LCSW  9/12/2022  14:45 EDT

## 2022-09-14 ENCOUNTER — LAB (OUTPATIENT)
Dept: LAB | Facility: HOSPITAL | Age: 37
End: 2022-09-14

## 2022-09-14 DIAGNOSIS — F15.20 METHAMPHETAMINE USE DISORDER, SEVERE, DEPENDENCE: ICD-10-CM

## 2022-09-14 DIAGNOSIS — F11.20 OPIOID USE DISORDER, SEVERE, DEPENDENCE: ICD-10-CM

## 2022-09-14 PROCEDURE — G0480 DRUG TEST DEF 1-7 CLASSES: HCPCS

## 2022-09-14 PROCEDURE — 80307 DRUG TEST PRSMV CHEM ANLYZR: CPT

## 2022-09-15 ENCOUNTER — TELEMEDICINE (OUTPATIENT)
Dept: PSYCHIATRY | Facility: CLINIC | Age: 37
End: 2022-09-15

## 2022-09-15 LAB — ETHANOL UR-MCNC: NEGATIVE %

## 2022-09-16 NOTE — PROGRESS NOTES
CD IOP GROUP     Date: 09/05/2022  Name: Ada Vogt    Time In: 1530   Time Out: 1626    This provider is located at  located at [address] [place] [address], [address] [place] [id number].  The Patient is seen remotely at his/her home, using Zoom. Patient is being seen via telehealth and confirm that they are in a secure environment for this session. The patient's condition being diagnosed/treated is appropriate for telemedicine. The provider identified himself as well as his credentials. The patient gave consent to be seen remotely, and when consent is given they understand that the consent allows for patient identifiable information to be sent to a third party as needed. They may refuse to be seen remotely at any time. The electronic data is encrypted and password protected, and the patient has been advised of the potential risks to privacy not withstanding such measures.      Number of participants: 8    IOP GROUP NOTE     Data: 1 hour IOP group therapy session (Check-ins, Coping Skills, Relapse Prevention)     Check Ins: Therapist continued facilitation of rapport building strategies between group members. Therapist asked that each patient check in with home life and recovery efforts and identify triggers, cravings, and high risk situations that arise between group sessions. Therapist provided empathy and support during group session.     Session Content/Coping Skills: Check ins completed by group members. Clinician processed stressors presented by group members. Clinician discussed with group members being aware of relapse warning signs. [facility] psychoeducational material began.     Response: Patient attended class via Zoom. Patient participated in verbal completion of check in form. Patient during check in denied suicidal or homicidal thoughts. Patient participated in group discussions.      Personal Assessment 0-10 Scale (10 worst)    Anxiety:  5   Depression:  1   Cravings: 3      Assessment:     ..  Lab on 08/30/2022   Component Date Value Ref Range Status   • Ethanol, Urine 08/30/2022 Negative  Cutoff=0.020 % Final   • THC, Screen, Urine 08/30/2022 Negative  Negative Final   • Phencyclidine (PCP), Urine 08/30/2022 Negative  Negative Final   • Cocaine Screen, Urine 08/30/2022 Negative  Negative Final   • Methamphetamine, Ur 08/30/2022 Negative  Negative Final   • Opiate Screen 08/30/2022 Negative  Negative Final   • Amphetamine Screen, Urine 08/30/2022 Negative  Negative Final   • Benzodiazepine Screen, Urine 08/30/2022 Negative  Negative Final   • Tricyclic Antidepressants Screen 08/30/2022 Negative  Negative Final   • Methadone Screen, Urine 08/30/2022 Positive (A) Negative Final   • Barbiturates Screen, Urine 08/30/2022 Negative  Negative Final   • Oxycodone Screen, Urine 08/30/2022 Negative  Negative Final   • Propoxyphene Screen 08/30/2022 Negative  Negative Final   • Buprenorphine, Screen, Urine 08/30/2022 Negative  Negative Final   • Methadone 08/30/2022 +POSITIVE+   Final   • Methadone, Quantitative 08/30/2022 07282  ng/mg creat Final   • EDDP 08/30/2022 66749  ng/mg creat Final   • Level of Detection: 08/30/2022 Comment   Final    Testing Threshold:  50 ng/mL                                                                       '  This test was developed and its performance characteristics  determined by [facility].  It has not been cleared or approved  by the Food and Drug Administration.   Lab on 08/29/2022   Component Date Value Ref Range Status   • Ethanol, Urine 08/29/2022 Negative  Cutoff=0.020 % Final   • THC, Screen, Urine 08/29/2022 Negative  Negative Final   • Phencyclidine (PCP), Urine 08/29/2022 Negative  Negative Final   • Cocaine Screen, Urine 08/29/2022 Negative  Negative Final   • Methamphetamine, Ur 08/29/2022 Negative  Negative Final   • Opiate Screen 08/29/2022 Negative  Negative Final   • Amphetamine Screen, Urine 08/29/2022 Negative  Negative Final   •  Benzodiazepine Screen, Urine 08/29/2022 Negative  Negative Final   • Tricyclic Antidepressants Screen 08/29/2022 Negative  Negative Final   • Methadone Screen, Urine 08/29/2022 Positive (A) Negative Final   • Barbiturates Screen, Urine 08/29/2022 Negative  Negative Final   • Oxycodone Screen, Urine 08/29/2022 Negative  Negative Final   • Propoxyphene Screen 08/29/2022 Negative  Negative Final   • Buprenorphine, Screen, Urine 08/29/2022 Negative  Negative Final   • Methadone 08/29/2022 +POSITIVE+   Final   • Methadone, Quantitative 08/29/2022 4928  ng/mg creat Final   • EDDP 08/29/2022 35688  ng/mg creat Final   • Level of Detection: 08/29/2022 Comment   Final    Testing Threshold:  50 ng/mL                                                                       '  This test was developed and its performance characteristics  determined by [facility].  It has not been cleared or approved  by the Food and Drug Administration.   Admission on 08/21/2022, Discharged on 08/21/2022   Component Date Value Ref Range Status   • Glucose 08/21/2022 82  65 - 99 mg/dL Final   • BUN 08/21/2022 5 (A) 6 - 20 mg/dL Final   • Creatinine 08/21/2022 0.49 (A) 0.57 - 1.00 mg/dL Final   • Sodium 08/21/2022 137  136 - 145 mmol/L Final   • Potassium 08/21/2022 3.5  3.5 - 5.2 mmol/L Final   • Chloride 08/21/2022 99  98 - 107 mmol/L Final   • CO2 08/21/2022 28.8  22.0 - 29.0 mmol/L Final   • Calcium 08/21/2022 8.7  8.6 - 10.5 mg/dL Final   • Total Protein 08/21/2022 6.2  6.0 - 8.5 g/dL Final   • Albumin 08/21/2022 3.60  3.50 - 5.20 g/dL Final   • ALT (SGPT) 08/21/2022 20  1 - 33 U/L Final   • AST (SGOT) 08/21/2022 24  1 - 32 U/L Final   • Alkaline Phosphatase 08/21/2022 102  39 - 117 U/L Final   • Total Bilirubin 08/21/2022 0.4  0.0 - 1.2 mg/dL Final   • Globulin 08/21/2022 2.6  gm/dL Final   • A/G Ratio 08/21/2022 1.4  g/dL Final   • BUN/Creatinine Ratio 08/21/2022 10.2  7.0 - 25.0 Final   • Anion Gap 08/21/2022 9.2  5.0 - 15.0 mmol/L Final   •  eGFR 08/21/2022 126.2  >60.0 mL/min/1.73 Final    National Kidney Foundation and American Society of Nephrology (ASN) Task Force recommended calculation based on the Chronic Kidney Disease Epidemiology Collaboration (CKD-EPI) equation refit without adjustment for race.   • Procalcitonin 08/21/2022 2.30 (A) 0.00 - 0.25 ng/mL Final   • Lactate 08/21/2022 1.2  0.5 - 2.0 mmol/L Final   • Ferritin 08/21/2022 220.40 (A) 13.00 - 150.00 ng/mL Final   • Fibrinogen 08/21/2022 705 (A) 209 - 518 mg/dL Final   • C-Reactive Protein 08/21/2022 16.65 (A) 0.00 - 0.50 mg/dL Final   • Troponin T 08/21/2022 <0.010  0.000 - 0.030 ng/mL Final   • WBC 08/21/2022 13.53 (A) 3.40 - 10.80 10*3/mm3 Final   • RBC 08/21/2022 4.29  3.77 - 5.28 10*6/mm3 Final   • Hemoglobin 08/21/2022 12.4  12.0 - 15.9 g/dL Final   • Hematocrit 08/21/2022 36.7  34.0 - 46.6 % Final   • MCV 08/21/2022 85.5  79.0 - 97.0 fL Final   • MCH 08/21/2022 28.9  26.6 - 33.0 pg Final   • MCHC 08/21/2022 33.8  31.5 - 35.7 g/dL Final   • RDW 08/21/2022 13.1  12.3 - 15.4 % Final   • RDW-SD 08/21/2022 40.4  37.0 - 54.0 fl Final   • MPV 08/21/2022 9.1  6.0 - 12.0 fL Final   • Platelets 08/21/2022 363  140 - 450 10*3/mm3 Final   • Neutrophil % 08/21/2022 83.9 (A) 42.7 - 76.0 % Final   • Lymphocyte % 08/21/2022 10.0 (A) 19.6 - 45.3 % Final   • Monocyte % 08/21/2022 5.2  5.0 - 12.0 % Final   • Eosinophil % 08/21/2022 0.4  0.3 - 6.2 % Final   • Basophil % 08/21/2022 0.3  0.0 - 1.5 % Final   • Immature Grans % 08/21/2022 0.2  0.0 - 0.5 % Final   • Neutrophils, Absolute 08/21/2022 11.35 (A) 1.70 - 7.00 10*3/mm3 Final   • Lymphocytes, Absolute 08/21/2022 1.35  0.70 - 3.10 10*3/mm3 Final   • Monocytes, Absolute 08/21/2022 0.71  0.10 - 0.90 10*3/mm3 Final   • Eosinophils, Absolute 08/21/2022 0.05  0.00 - 0.40 10*3/mm3 Final   • Basophils, Absolute 08/21/2022 0.04  0.00 - 0.20 10*3/mm3 Final   • Immature Grans, Absolute 08/21/2022 0.03  0.00 - 0.05 10*3/mm3 Final   • nRBC 08/21/2022 0.0  0.0  - 0.2 /100 WBC Final   • COVID19 08/21/2022 Not Detected  Not Detected - Ref. Range Final   Lab on 08/19/2022   Component Date Value Ref Range Status   • Ethanol, Urine 08/19/2022 Negative  Cutoff=0.020 % Final   • THC, Screen, Urine 08/19/2022 Negative  Negative Final   • Phencyclidine (PCP), Urine 08/19/2022 Negative  Negative Final   • Cocaine Screen, Urine 08/19/2022 Negative  Negative Final   • Methamphetamine, Ur 08/19/2022 Negative  Negative Final   • Opiate Screen 08/19/2022 Negative  Negative Final   • Amphetamine Screen, Urine 08/19/2022 Negative  Negative Final   • Benzodiazepine Screen, Urine 08/19/2022 Negative  Negative Final   • Tricyclic Antidepressants Screen 08/19/2022 Negative  Negative Final   • Methadone Screen, Urine 08/19/2022 Positive (A) Negative Final   • Barbiturates Screen, Urine 08/19/2022 Negative  Negative Final   • Oxycodone Screen, Urine 08/19/2022 Negative  Negative Final   • Propoxyphene Screen 08/19/2022 Negative  Negative Final   • Buprenorphine, Screen, Urine 08/19/2022 Negative  Negative Final       Mental Status Exam  Hygiene:  good  Dress: casual  Attitude: cooperative and agreeable   Motor Activity: appropriate  Eye Contact:  good  Speech: regular rate and rhythm   Mood:  calm and cooperative  Affect:  Appropriate  Thought Processes:  Linear  Thought Content:  Normal  Suicidal Thoughts:  denies  Homicidal Thoughts:  denies  Crisis Safety Plan: Safety plan has been discussed.   Hallucinations: Unknown to clinician.   Reliability: fair  Insight: fair  Judgement: fair  Impulse Control: fair    Recovery/spiritual support group attendance: No     Progress toward goal: Not at goal    Prognosis: Fair with Ongoing Treatment     Self-reported number of days sober: Patient reported 164 days during check in.     Patient will contact this office, call 911 or present to the nearest emergency room should suicidal or homicidal ideations occur.    Impression/Formulation:  No diagnosis  found.    Clinical Maneuvering/Interventions: Therapist utilized a person-centered approach to build rapport with group member. Therapist implemented motivational interviewing techniques to assist client with exploring and resolving ambivalence associated with commitment to change behaviors related to substance use and addiction. Therapist applied cognitive behavioral strategies to facilitate identification of maladaptive patterns of thinking and behavior that contribute to client's risk for continued substance use and relapse. Therapist employed group interaction activities to build rapport among group members, promote sobriety, and emphasize relapse prevention. Therapist promoted safe nonjudgmental environment by providing group members with unconditional positive regard and encouraging group members to comply with group rules and guidelines. Therapist assisted group member with identifying and implementing healthier coping strategies.      Plan:  Continue [facility] IOP Phase II  Aftercare:  [facility] Phase III  Program Assignments:  Personal recovery plan, relapse prevention plan, attendance of recovery support group meetings, exploration of sponsorship, drug/alcohol screens.     Amor Tabares LCSW  9/16/2022  12:38 EDT

## 2022-09-19 ENCOUNTER — TELEMEDICINE (OUTPATIENT)
Dept: PSYCHIATRY | Facility: CLINIC | Age: 37
End: 2022-09-19

## 2022-09-20 NOTE — PROGRESS NOTES
"CD IOP GROUP     Date: 09/12/2022  Name: Ada Vogt    Time In: 1530   Time Out: 1630    This provider is located at [facility] located at [address] [place] [address], [address] [place] [id number].  The Patient is seen remotely at his/her home, using Zoom. Patient is being seen via telehealth and confirm that they are in a secure environment for this session. The patient's condition being diagnosed/treated is appropriate for telemedicine. The provider identified himself as well as his credentials. The patient gave consent to be seen remotely, and when consent is given they understand that the consent allows for patient identifiable information to be sent to a third party as needed. They may refuse to be seen remotely at any time. The electronic data is encrypted and password protected, and the patient has been advised of the potential risks to privacy not withstanding such measures.      Number of participants: 13    IOP GROUP NOTE     Data: 1 hour IOP group therapy session (Check-ins, Coping Skills, Relapse Prevention)     Check Ins: Therapist continued facilitation of rapport building strategies between group members. Therapist asked that each patient check in with home life and recovery efforts and identify triggers, cravings, and high risk situations that arise between group sessions. Therapist provided empathy and support during group session.     Session Content/Coping Skills: Check ins completed by group members. Clinician processed stressors presented by group members. , Gini, joined meeting and shared and discussed just for today reading. Clinician discussed with group members focusing on what you are gaining in recovery.     Response: Patient attended class via Zoom. Patient participated in verbal completion of check in form. Patient reported during check in that she had attended two meetings on the [employer] [facility]. Patient during check in answered no to question \"currently or " "since last group meeting have you had any suicidal thoughts or plan or intent to hurt yourself”, and patient also answered no to question of \"currently or since your last group meeting, have you had any homicidal thoughts or plan or intent to hurt others\". Patient answered no to all other questions during check in. Patient participated in group discussions.      Personal Assessment 0-10 Scale (10 worst)    Anxiety:  2   Depression:  2   Cravings: 0     Assessment:     ..  Lab on 09/07/2022   Component Date Value Ref Range Status   • Ethanol, Urine 09/07/2022 Negative  Cutoff=0.020 % Final   • THC, Screen, Urine 09/07/2022 Negative  Negative Final   • Phencyclidine (PCP), Urine 09/07/2022 Negative  Negative Final   • Cocaine Screen, Urine 09/07/2022 Negative  Negative Final   • Methamphetamine, Ur 09/07/2022 Negative  Negative Final   • Opiate Screen 09/07/2022 Negative  Negative Final   • Amphetamine Screen, Urine 09/07/2022 Negative  Negative Final   • Benzodiazepine Screen, Urine 09/07/2022 Negative  Negative Final   • Tricyclic Antidepressants Screen 09/07/2022 Negative  Negative Final   • Methadone Screen, Urine 09/07/2022 Positive (A) Negative Final   • Barbiturates Screen, Urine 09/07/2022 Negative  Negative Final   • Oxycodone Screen, Urine 09/07/2022 Negative  Negative Final   • Propoxyphene Screen 09/07/2022 Negative  Negative Final   • Buprenorphine, Screen, Urine 09/07/2022 Negative  Negative Final   • Methadone 09/07/2022 +POSITIVE+   Final   • Methadone, Quantitative 09/07/2022 >52433  ng/mg creat Final   • EDDP 09/07/2022 >29579  ng/mg creat Final   • Level of Detection: 09/07/2022 Comment   Final    Testing Threshold:  50 ng/mL                                                                       '  This test was developed and its performance characteristics  determined by [facility].  It has not been cleared or approved  by the Food and Drug Administration.   Lab on 08/30/2022   Component Date Value Ref " Range Status   • Ethanol, Urine 08/30/2022 Negative  Cutoff=0.020 % Final   • THC, Screen, Urine 08/30/2022 Negative  Negative Final   • Phencyclidine (PCP), Urine 08/30/2022 Negative  Negative Final   • Cocaine Screen, Urine 08/30/2022 Negative  Negative Final   • Methamphetamine, Ur 08/30/2022 Negative  Negative Final   • Opiate Screen 08/30/2022 Negative  Negative Final   • Amphetamine Screen, Urine 08/30/2022 Negative  Negative Final   • Benzodiazepine Screen, Urine 08/30/2022 Negative  Negative Final   • Tricyclic Antidepressants Screen 08/30/2022 Negative  Negative Final   • Methadone Screen, Urine 08/30/2022 Positive (A) Negative Final   • Barbiturates Screen, Urine 08/30/2022 Negative  Negative Final   • Oxycodone Screen, Urine 08/30/2022 Negative  Negative Final   • Propoxyphene Screen 08/30/2022 Negative  Negative Final   • Buprenorphine, Screen, Urine 08/30/2022 Negative  Negative Final   • Methadone 08/30/2022 +POSITIVE+   Final   • Methadone, Quantitative 08/30/2022 28707  ng/mg creat Final   • EDDP 08/30/2022 45920  ng/mg creat Final   • Level of Detection: 08/30/2022 Comment   Final    Testing Threshold:  50 ng/mL                                                                       '  This test was developed and its performance characteristics  determined by [facility].  It has not been cleared or approved  by the Food and Drug Administration.   Lab on 08/29/2022   Component Date Value Ref Range Status   • Ethanol, Urine 08/29/2022 Negative  Cutoff=0.020 % Final   • THC, Screen, Urine 08/29/2022 Negative  Negative Final   • Phencyclidine (PCP), Urine 08/29/2022 Negative  Negative Final   • Cocaine Screen, Urine 08/29/2022 Negative  Negative Final   • Methamphetamine, Ur 08/29/2022 Negative  Negative Final   • Opiate Screen 08/29/2022 Negative  Negative Final   • Amphetamine Screen, Urine 08/29/2022 Negative  Negative Final   • Benzodiazepine Screen, Urine 08/29/2022 Negative  Negative Final   •  Tricyclic Antidepressants Screen 08/29/2022 Negative  Negative Final   • Methadone Screen, Urine 08/29/2022 Positive (A) Negative Final   • Barbiturates Screen, Urine 08/29/2022 Negative  Negative Final   • Oxycodone Screen, Urine 08/29/2022 Negative  Negative Final   • Propoxyphene Screen 08/29/2022 Negative  Negative Final   • Buprenorphine, Screen, Urine 08/29/2022 Negative  Negative Final   • Methadone 08/29/2022 +POSITIVE+   Final   • Methadone, Quantitative 08/29/2022 4928  ng/mg creat Final   • EDDP 08/29/2022 68629  ng/mg creat Final   • Level of Detection: 08/29/2022 Comment   Final    Testing Threshold:  50 ng/mL                                                                       '  This test was developed and its performance characteristics  determined by [facility].  It has not been cleared or approved  by the Food and Drug Administration.       Mental Status Exam  Hygiene:  good  Dress: casual  Attitude: cooperative and agreeable   Motor Activity: appropriate  Eye Contact:  good  Speech: regular rate and rhythm   Mood:  calm and cooperative  Affect:  Appropriate  Thought Processes:  Linear  Thought Content:  Normal  Suicidal Thoughts:  denies  Homicidal Thoughts:  denies  Crisis Safety Plan: Safety plan has been discussed.   Hallucinations: Unknown to clinician.   Reliability: fair  Insight: fair  Judgement: fair  Impulse Control: fair    Recovery/spiritual support group attendance: Patient reported during check in that she had attended two meetings on [facility] [employer] [facility].      Progress toward goal: Not at goal    Prognosis: Fair with Ongoing Treatment     Self-reported number of days sober: Patient reported 171 days during check in.     Patient will contact this office, call 911 or present to the nearest emergency room should suicidal or homicidal ideations occur.    Impression/Formulation:  No diagnosis found.    Clinical Maneuvering/Interventions: Therapist utilized a person-centered approach to build  rapport with group member. Therapist implemented motivational interviewing techniques to assist client with exploring and resolving ambivalence associated with commitment to change behaviors related to substance use and addiction. Therapist applied cognitive behavioral strategies to facilitate identification of maladaptive patterns of thinking and behavior that contribute to client's risk for continued substance use and relapse. Therapist employed group interaction activities to build rapport among group members, promote sobriety, and emphasize relapse prevention. Therapist promoted safe nonjudgmental environment by providing group members with unconditional positive regard and encouraging group members to comply with group rules and guidelines. Therapist assisted group member with identifying and implementing healthier coping strategies.      Plan:  Continue [facility] IOP Phase II  Aftercare:  [facility] Phase III  Program Assignments:  Personal recovery plan, relapse prevention plan, attendance of recovery support group meetings, exploration of sponsorship, drug/alcohol screens.     Amor Tabares LCSW  9/20/2022  12:56 EDT

## 2022-09-20 NOTE — PROGRESS NOTES
CD IOP GROUP     Date: 09/08/2022  Name: Ada Vogt    Time In: 1530   Time Out: 1630    This provider is located at [facility] located at [address] [place] [address], [address] [place] [id number].  The Patient is seen remotely at his/her home, using Zoom. Patient is being seen via telehealth and confirm that they are in a secure environment for this session. The patient's condition being diagnosed/treated is appropriate for telemedicine. The provider identified himself as well as his credentials. The patient gave consent to be seen remotely, and when consent is given they understand that the consent allows for patient identifiable information to be sent to a third party as needed. They may refuse to be seen remotely at any time. The electronic data is encrypted and password protected, and the patient has been advised of the potential risks to privacy not withstanding such measures.      Number of participants: 10    IOP GROUP NOTE     Data: 1 hour IOP group therapy session (Check-ins, Coping Skills, Relapse Prevention)     Check Ins: Therapist continued facilitation of rapport building strategies between group members. Therapist asked that each patient check in with home life and recovery efforts and identify triggers, cravings, and high risk situations that arise between group sessions. Therapist provided empathy and support during group session.     Session Content/Coping Skills: Check ins completed by group members. Clinician processed stressors presented by group members. MAT provider attended session and provided medication storage bags. Clinician shared [facility] resources with group members as well as [facility] [employer] resources. Clinician processed with group members having “fun” in recovery and re-conceptualizing the idea of fun. Group members discussed resources that they know of to assist with rebuilding credit.     Response: Patient attended class via Zoom. Patient participated in verbal  "completion of check in form. Patient during check in answered no to question \"currently or since last group meeting have you had any suicidal thoughts or plan or intent to hurt yourself”, and patient also answered no to question of \"currently or since your last group meeting, have you had any homicidal thoughts or plan or intent to hurt others\". Patient answered no to all other questions during check in. Patient participated in group discussions.      Personal Assessment 0-10 Scale (10 worst)    Anxiety:  3   Depression:  0   Cravings: 0     Assessment:     ..  Lab on 09/07/2022   Component Date Value Ref Range Status   • Ethanol, Urine 09/07/2022 Negative  Cutoff=0.020 % Final   • THC, Screen, Urine 09/07/2022 Negative  Negative Final   • Phencyclidine (PCP), Urine 09/07/2022 Negative  Negative Final   • Cocaine Screen, Urine 09/07/2022 Negative  Negative Final   • Methamphetamine, Ur 09/07/2022 Negative  Negative Final   • Opiate Screen 09/07/2022 Negative  Negative Final   • Amphetamine Screen, Urine 09/07/2022 Negative  Negative Final   • Benzodiazepine Screen, Urine 09/07/2022 Negative  Negative Final   • Tricyclic Antidepressants Screen 09/07/2022 Negative  Negative Final   • Methadone Screen, Urine 09/07/2022 Positive (A) Negative Final   • Barbiturates Screen, Urine 09/07/2022 Negative  Negative Final   • Oxycodone Screen, Urine 09/07/2022 Negative  Negative Final   • Propoxyphene Screen 09/07/2022 Negative  Negative Final   • Buprenorphine, Screen, Urine 09/07/2022 Negative  Negative Final   • Methadone 09/07/2022 +POSITIVE+   Final   • Methadone, Quantitative 09/07/2022 >63039  ng/mg creat Final   • EDDP 09/07/2022 >02270  ng/mg creat Final   • Level of Detection: 09/07/2022 Comment   Final    Testing Threshold:  50 ng/mL                                                                       '  This test was developed and its performance characteristics  determined by [facility].  It has not been cleared or " approved  by the Food and Drug Administration.   Lab on 08/30/2022   Component Date Value Ref Range Status   • Ethanol, Urine 08/30/2022 Negative  Cutoff=0.020 % Final   • THC, Screen, Urine 08/30/2022 Negative  Negative Final   • Phencyclidine (PCP), Urine 08/30/2022 Negative  Negative Final   • Cocaine Screen, Urine 08/30/2022 Negative  Negative Final   • Methamphetamine, Ur 08/30/2022 Negative  Negative Final   • Opiate Screen 08/30/2022 Negative  Negative Final   • Amphetamine Screen, Urine 08/30/2022 Negative  Negative Final   • Benzodiazepine Screen, Urine 08/30/2022 Negative  Negative Final   • Tricyclic Antidepressants Screen 08/30/2022 Negative  Negative Final   • Methadone Screen, Urine 08/30/2022 Positive (A) Negative Final   • Barbiturates Screen, Urine 08/30/2022 Negative  Negative Final   • Oxycodone Screen, Urine 08/30/2022 Negative  Negative Final   • Propoxyphene Screen 08/30/2022 Negative  Negative Final   • Buprenorphine, Screen, Urine 08/30/2022 Negative  Negative Final   • Methadone 08/30/2022 +POSITIVE+   Final   • Methadone, Quantitative 08/30/2022 76449  ng/mg creat Final   • EDDP 08/30/2022 56976  ng/mg creat Final   • Level of Detection: 08/30/2022 Comment   Final    Testing Threshold:  50 ng/mL                                                                       '  This test was developed and its performance characteristics  determined by [facility].  It has not been cleared or approved  by the Food and Drug Administration.   Lab on 08/29/2022   Component Date Value Ref Range Status   • Ethanol, Urine 08/29/2022 Negative  Cutoff=0.020 % Final   • THC, Screen, Urine 08/29/2022 Negative  Negative Final   • Phencyclidine (PCP), Urine 08/29/2022 Negative  Negative Final   • Cocaine Screen, Urine 08/29/2022 Negative  Negative Final   • Methamphetamine, Ur 08/29/2022 Negative  Negative Final   • Opiate Screen 08/29/2022 Negative  Negative Final   • Amphetamine Screen, Urine 08/29/2022 Negative   Negative Final   • Benzodiazepine Screen, Urine 08/29/2022 Negative  Negative Final   • Tricyclic Antidepressants Screen 08/29/2022 Negative  Negative Final   • Methadone Screen, Urine 08/29/2022 Positive (A) Negative Final   • Barbiturates Screen, Urine 08/29/2022 Negative  Negative Final   • Oxycodone Screen, Urine 08/29/2022 Negative  Negative Final   • Propoxyphene Screen 08/29/2022 Negative  Negative Final   • Buprenorphine, Screen, Urine 08/29/2022 Negative  Negative Final   • Methadone 08/29/2022 +POSITIVE+   Final   • Methadone, Quantitative 08/29/2022 4928  ng/mg creat Final   • EDDP 08/29/2022 96336  ng/mg creat Final   • Level of Detection: 08/29/2022 Comment   Final    Testing Threshold:  50 ng/mL                                                                       '  This test was developed and its performance characteristics  determined by [facility].  It has not been cleared or approved  by the Food and Drug Administration.   Admission on 08/21/2022, Discharged on 08/21/2022   Component Date Value Ref Range Status   • Glucose 08/21/2022 82  65 - 99 mg/dL Final   • BUN 08/21/2022 5 (A) 6 - 20 mg/dL Final   • Creatinine 08/21/2022 0.49 (A) 0.57 - 1.00 mg/dL Final   • Sodium 08/21/2022 137  136 - 145 mmol/L Final   • Potassium 08/21/2022 3.5  3.5 - 5.2 mmol/L Final   • Chloride 08/21/2022 99  98 - 107 mmol/L Final   • CO2 08/21/2022 28.8  22.0 - 29.0 mmol/L Final   • Calcium 08/21/2022 8.7  8.6 - 10.5 mg/dL Final   • Total Protein 08/21/2022 6.2  6.0 - 8.5 g/dL Final   • Albumin 08/21/2022 3.60  3.50 - 5.20 g/dL Final   • ALT (SGPT) 08/21/2022 20  1 - 33 U/L Final   • AST (SGOT) 08/21/2022 24  1 - 32 U/L Final   • Alkaline Phosphatase 08/21/2022 102  39 - 117 U/L Final   • Total Bilirubin 08/21/2022 0.4  0.0 - 1.2 mg/dL Final   • Globulin 08/21/2022 2.6  gm/dL Final   • A/G Ratio 08/21/2022 1.4  g/dL Final   • BUN/Creatinine Ratio 08/21/2022 10.2  7.0 - 25.0 Final   • Anion Gap 08/21/2022 9.2  5.0 - 15.0  mmol/L Final   • eGFR 08/21/2022 126.2  >60.0 mL/min/1.73 Final    National Kidney Foundation and American Society of Nephrology (ASN) Task Force recommended calculation based on the Chronic Kidney Disease Epidemiology Collaboration (CKD-EPI) equation refit without adjustment for race.   • Procalcitonin 08/21/2022 2.30 (A) 0.00 - 0.25 ng/mL Final   • Lactate 08/21/2022 1.2  0.5 - 2.0 mmol/L Final   • Ferritin 08/21/2022 220.40 (A) 13.00 - 150.00 ng/mL Final   • Fibrinogen 08/21/2022 705 (A) 209 - 518 mg/dL Final   • C-Reactive Protein 08/21/2022 16.65 (A) 0.00 - 0.50 mg/dL Final   • Troponin T 08/21/2022 <0.010  0.000 - 0.030 ng/mL Final   • WBC 08/21/2022 13.53 (A) 3.40 - 10.80 10*3/mm3 Final   • RBC 08/21/2022 4.29  3.77 - 5.28 10*6/mm3 Final   • Hemoglobin 08/21/2022 12.4  12.0 - 15.9 g/dL Final   • Hematocrit 08/21/2022 36.7  34.0 - 46.6 % Final   • MCV 08/21/2022 85.5  79.0 - 97.0 fL Final   • MCH 08/21/2022 28.9  26.6 - 33.0 pg Final   • MCHC 08/21/2022 33.8  31.5 - 35.7 g/dL Final   • RDW 08/21/2022 13.1  12.3 - 15.4 % Final   • RDW-SD 08/21/2022 40.4  37.0 - 54.0 fl Final   • MPV 08/21/2022 9.1  6.0 - 12.0 fL Final   • Platelets 08/21/2022 363  140 - 450 10*3/mm3 Final   • Neutrophil % 08/21/2022 83.9 (A) 42.7 - 76.0 % Final   • Lymphocyte % 08/21/2022 10.0 (A) 19.6 - 45.3 % Final   • Monocyte % 08/21/2022 5.2  5.0 - 12.0 % Final   • Eosinophil % 08/21/2022 0.4  0.3 - 6.2 % Final   • Basophil % 08/21/2022 0.3  0.0 - 1.5 % Final   • Immature Grans % 08/21/2022 0.2  0.0 - 0.5 % Final   • Neutrophils, Absolute 08/21/2022 11.35 (A) 1.70 - 7.00 10*3/mm3 Final   • Lymphocytes, Absolute 08/21/2022 1.35  0.70 - 3.10 10*3/mm3 Final   • Monocytes, Absolute 08/21/2022 0.71  0.10 - 0.90 10*3/mm3 Final   • Eosinophils, Absolute 08/21/2022 0.05  0.00 - 0.40 10*3/mm3 Final   • Basophils, Absolute 08/21/2022 0.04  0.00 - 0.20 10*3/mm3 Final   • Immature Grans, Absolute 08/21/2022 0.03  0.00 - 0.05 10*3/mm3 Final   • nRBC  08/21/2022 0.0  0.0 - 0.2 /100 WBC Final   • COVID19 08/21/2022 Not Detected  Not Detected - Ref. Range Final   Lab on 08/19/2022   Component Date Value Ref Range Status   • Ethanol, Urine 08/19/2022 Negative  Cutoff=0.020 % Final   • THC, Screen, Urine 08/19/2022 Negative  Negative Final   • Phencyclidine (PCP), Urine 08/19/2022 Negative  Negative Final   • Cocaine Screen, Urine 08/19/2022 Negative  Negative Final   • Methamphetamine, Ur 08/19/2022 Negative  Negative Final   • Opiate Screen 08/19/2022 Negative  Negative Final   • Amphetamine Screen, Urine 08/19/2022 Negative  Negative Final   • Benzodiazepine Screen, Urine 08/19/2022 Negative  Negative Final   • Tricyclic Antidepressants Screen 08/19/2022 Negative  Negative Final   • Methadone Screen, Urine 08/19/2022 Positive (A) Negative Final   • Barbiturates Screen, Urine 08/19/2022 Negative  Negative Final   • Oxycodone Screen, Urine 08/19/2022 Negative  Negative Final   • Propoxyphene Screen 08/19/2022 Negative  Negative Final   • Buprenorphine, Screen, Urine 08/19/2022 Negative  Negative Final       Mental Status Exam  Hygiene:  good  Dress: casual  Attitude: cooperative and agreeable   Motor Activity: appropriate  Eye Contact:  good  Speech: regular rate and rhythm   Mood:  calm and cooperative  Affect:  Appropriate  Thought Processes:  Linear  Thought Content:  Normal  Suicidal Thoughts:  denies  Homicidal Thoughts:  denies  Crisis Safety Plan: Safety plan has been discussed.   Hallucinations: Unknown to clinician.   Reliability: fair  Insight: fair  Judgement: fair  Impulse Control: fair    Recovery/spiritual support group attendance: No     Progress toward goal: Not at goal    Prognosis: Fair with Ongoing Treatment     Self-reported number of days sober: Patient reported 167 during check in.     Patient will contact this office, call 911 or present to the nearest emergency room should suicidal or homicidal ideations occur.    Impression/Formulation:  No  diagnosis found.    Clinical Maneuvering/Interventions: Therapist utilized a person-centered approach to build rapport with group member. Therapist implemented motivational interviewing techniques to assist client with exploring and resolving ambivalence associated with commitment to change behaviors related to substance use and addiction. Therapist applied cognitive behavioral strategies to facilitate identification of maladaptive patterns of thinking and behavior that contribute to client's risk for continued substance use and relapse. Therapist employed group interaction activities to build rapport among group members, promote sobriety, and emphasize relapse prevention. Therapist promoted safe nonjudgmental environment by providing group members with unconditional positive regard and encouraging group members to comply with group rules and guidelines. Therapist assisted group member with identifying and implementing healthier coping strategies.      Plan:  Continue [facility] IOP Phase II  Aftercare:  [facility] Phase III  Program Assignments:  Personal recovery plan, relapse prevention plan, attendance of recovery support group meetings, exploration of sponsorship, drug/alcohol screens.     Amor Tabares LCSW  9/20/2022  12:22 EDT

## 2022-09-20 NOTE — PROGRESS NOTES
"CD IOP GROUP     Date: 09/19/2022  Name: Ada Vogt    Time In: 1530   Time Out: 1625    This provider is located at [facility] located at [address] [place] [address], [address] [place] [id number].  The Patient is seen remotely at his/her home, using Zoom. Patient is being seen via telehealth and confirm that they are in a secure environment for this session. The patient's condition being diagnosed/treated is appropriate for telemedicine. The provider identified himself as well as his credentials. The patient gave consent to be seen remotely, and when consent is given they understand that the consent allows for patient identifiable information to be sent to a third party as needed. They may refuse to be seen remotely at any time. The electronic data is encrypted and password protected, and the patient has been advised of the potential risks to privacy not withstanding such measures.      Number of participants: 12    IOP GROUP NOTE     Data: 1 hour IOP group therapy session (Check-ins, Coping Skills, Relapse Prevention)     Check Ins: Therapist continued facilitation of rapport building strategies between group members. Therapist asked that each patient check in with home life and recovery efforts and identify triggers, cravings, and high risk situations that arise between group sessions. Therapist provided empathy and support during group session.     Session Content/Coping Skills: Check ins completed by group members. Clinician explored with group members motivations for treatment and clinician processed group member’s discussions of finding motivation for treatment and challenges at their stage of recovery.     Response: Patient attended class via Zoom. Patient participated in verbal completion of check in form. Patient during check in answered no to question \"currently or since last group meeting have you had any suicidal thoughts or plan or intent to hurt yourself”, and patient also answered no to " "question of \"currently or since your last group meeting, have you had any homicidal thoughts or plan or intent to hurt others\". Patient answered no to all other questions during check in. Patient participated in group discussions.      Personal Assessment 0-10 Scale (10 worst)    Anxiety:  5   Depression:  3   Cravings: 0     Assessment:     ..  Lab on 09/14/2022   Component Date Value Ref Range Status   • Ethanol, Urine 09/14/2022 Negative  Cutoff=0.020 % Final   • THC, Screen, Urine 09/14/2022 Negative  Negative Final   • Phencyclidine (PCP), Urine 09/14/2022 Negative  Negative Final   • Cocaine Screen, Urine 09/14/2022 Negative  Negative Final   • Methamphetamine, Ur 09/14/2022 Negative  Negative Final   • Opiate Screen 09/14/2022 Negative  Negative Final   • Amphetamine Screen, Urine 09/14/2022 Negative  Negative Final   • Benzodiazepine Screen, Urine 09/14/2022 Negative  Negative Final   • Tricyclic Antidepressants Screen 09/14/2022 Negative  Negative Final   • Methadone Screen, Urine 09/14/2022 Positive (A) Negative Final   • Barbiturates Screen, Urine 09/14/2022 Negative  Negative Final   • Oxycodone Screen, Urine 09/14/2022 Negative  Negative Final   • Propoxyphene Screen 09/14/2022 Negative  Negative Final   • Buprenorphine, Screen, Urine 09/14/2022 Negative  Negative Final   Lab on 09/07/2022   Component Date Value Ref Range Status   • Ethanol, Urine 09/07/2022 Negative  Cutoff=0.020 % Final   • THC, Screen, Urine 09/07/2022 Negative  Negative Final   • Phencyclidine (PCP), Urine 09/07/2022 Negative  Negative Final   • Cocaine Screen, Urine 09/07/2022 Negative  Negative Final   • Methamphetamine, Ur 09/07/2022 Negative  Negative Final   • Opiate Screen 09/07/2022 Negative  Negative Final   • Amphetamine Screen, Urine 09/07/2022 Negative  Negative Final   • Benzodiazepine Screen, Urine 09/07/2022 Negative  Negative Final   • Tricyclic Antidepressants Screen 09/07/2022 Negative  Negative Final   • Methadone " Screen, Urine 09/07/2022 Positive (A) Negative Final   • Barbiturates Screen, Urine 09/07/2022 Negative  Negative Final   • Oxycodone Screen, Urine 09/07/2022 Negative  Negative Final   • Propoxyphene Screen 09/07/2022 Negative  Negative Final   • Buprenorphine, Screen, Urine 09/07/2022 Negative  Negative Final   • Methadone 09/07/2022 +POSITIVE+   Final   • Methadone, Quantitative 09/07/2022 >15667  ng/mg creat Final   • EDDP 09/07/2022 >78140  ng/mg creat Final   • Level of Detection: 09/07/2022 Comment   Final    Testing Threshold:  50 ng/mL                                                                       '  This test was developed and its performance characteristics  determined by [facility].  It has not been cleared or approved  by the Food and Drug Administration.   Lab on 08/30/2022   Component Date Value Ref Range Status   • Ethanol, Urine 08/30/2022 Negative  Cutoff=0.020 % Final   • THC, Screen, Urine 08/30/2022 Negative  Negative Final   • Phencyclidine (PCP), Urine 08/30/2022 Negative  Negative Final   • Cocaine Screen, Urine 08/30/2022 Negative  Negative Final   • Methamphetamine, Ur 08/30/2022 Negative  Negative Final   • Opiate Screen 08/30/2022 Negative  Negative Final   • Amphetamine Screen, Urine 08/30/2022 Negative  Negative Final   • Benzodiazepine Screen, Urine 08/30/2022 Negative  Negative Final   • Tricyclic Antidepressants Screen 08/30/2022 Negative  Negative Final   • Methadone Screen, Urine 08/30/2022 Positive (A) Negative Final   • Barbiturates Screen, Urine 08/30/2022 Negative  Negative Final   • Oxycodone Screen, Urine 08/30/2022 Negative  Negative Final   • Propoxyphene Screen 08/30/2022 Negative  Negative Final   • Buprenorphine, Screen, Urine 08/30/2022 Negative  Negative Final   • Methadone 08/30/2022 +POSITIVE+   Final   • Methadone, Quantitative 08/30/2022 49702  ng/mg creat Final   • EDDP 08/30/2022 96040  ng/mg creat Final   • Level of Detection: 08/30/2022 Comment   Final     Testing Threshold:  50 ng/mL                                                                       '  This test was developed and its performance characteristics  determined by [facility].  It has not been cleared or approved  by the Food and Drug Administration.       Mental Status Exam  Hygiene:  good  Dress: casual  Attitude: cooperative and agreeable   Motor Activity: appropriate  Eye Contact:  good  Speech: regular rate and rhythm   Mood:  calm and cooperative  Affect:  Appropriate  Thought Processes:  Linear  Thought Content:  Normal  Suicidal Thoughts:  denies  Homicidal Thoughts:  denies  Crisis Safety Plan: Safety plan has been discussed.   Hallucinations: Unknown to clinician.   Reliability: fair  Insight: fair  Judgement: fair  Impulse Control: fair    Recovery/spiritual support group attendance: No     Progress toward goal: Not at goal    Prognosis: Fair with Ongoing Treatment     Self-reported number of days sober: Patient reported 178 days during check in.     Patient will contact this office, call 911 or present to the nearest emergency room should suicidal or homicidal ideations occur.    Impression/Formulation:  No diagnosis found.    Clinical Maneuvering/Interventions: Therapist utilized a person-centered approach to build rapport with group member. Therapist implemented motivational interviewing techniques to assist client with exploring and resolving ambivalence associated with commitment to change behaviors related to substance use and addiction. Therapist applied cognitive behavioral strategies to facilitate identification of maladaptive patterns of thinking and behavior that contribute to client's risk for continued substance use and relapse. Therapist employed group interaction activities to build rapport among group members, promote sobriety, and emphasize relapse prevention. Therapist promoted safe nonjudgmental environment by providing group members with unconditional positive regard and  encouraging group members to comply with group rules and guidelines. Therapist assisted group member with identifying and implementing healthier coping strategies.      Plan:  Continue [facility] IOP Phase II  Aftercare:  [facility] Phase III  Program Assignments:  Personal recovery plan, relapse prevention plan, attendance of recovery support group meetings, exploration of sponsorship, drug/alcohol screens.     Amor Tabares LCSW  9/20/2022  14:21 EDT

## 2022-09-20 NOTE — PROGRESS NOTES
"CD IOP GROUP     Date: 09/15/2022  Name: Ada Vogt    Time In: 1530   Time Out: 1630    This provider is located at  located at [address], [address] [place] [address].  The Patient is seen remotely at his/her home, using Zoom. Patient is being seen via telehealth and confirm that they are in a secure environment for this session. The patient's condition being diagnosed/treated is appropriate for telemedicine. The provider identified himself as well as his credentials. The patient gave consent to be seen remotely, and when consent is given they understand that the consent allows for patient identifiable information to be sent to a third party as needed. They may refuse to be seen remotely at any time. The electronic data is encrypted and password protected, and the patient has been advised of the potential risks to privacy not withstanding such measures.      Number of participants: 12    IOP GROUP NOTE     Data: 1 hour IOP group therapy session (Check-ins, Coping Skills, Relapse Prevention)     Check Ins: Therapist continued facilitation of rapport building strategies between group members. Therapist asked that each patient check in with home life and recovery efforts and identify triggers, cravings, and high risk situations that arise between group sessions. Therapist provided empathy and support during group session.     Session Content/Coping Skills: Student intern joined session. Check ins completed by group members. Clinician invited group members to share their story. Student intern shared resource located in [place], [place]. , [place], also joined meeting and shared [facility] [employer] reading.     Response: Patient attended class via Zoom. Patient participated in verbal completion of check in form. Patient during check in answered no to question \"currently or since last group meeting have you had any suicidal thoughts or plan or intent to hurt yourself”, and " "patient also answered no to question of \"currently or since your last group meeting, have you had any homicidal thoughts or plan or intent to hurt others\". Patient answered no to all other questions during check in. Patient participated in group discussions.      Personal Assessment 0-10 Scale (10 worst)    Anxiety:  7   Depression:  3   Cravings: 2     Assessment:     ..  Lab on 09/14/2022   Component Date Value Ref Range Status   • Ethanol, Urine 09/14/2022 Negative  Cutoff=0.020 % Final   • THC, Screen, Urine 09/14/2022 Negative  Negative Final   • Phencyclidine (PCP), Urine 09/14/2022 Negative  Negative Final   • Cocaine Screen, Urine 09/14/2022 Negative  Negative Final   • Methamphetamine, Ur 09/14/2022 Negative  Negative Final   • Opiate Screen 09/14/2022 Negative  Negative Final   • Amphetamine Screen, Urine 09/14/2022 Negative  Negative Final   • Benzodiazepine Screen, Urine 09/14/2022 Negative  Negative Final   • Tricyclic Antidepressants Screen 09/14/2022 Negative  Negative Final   • Methadone Screen, Urine 09/14/2022 Positive (A) Negative Final   • Barbiturates Screen, Urine 09/14/2022 Negative  Negative Final   • Oxycodone Screen, Urine 09/14/2022 Negative  Negative Final   • Propoxyphene Screen 09/14/2022 Negative  Negative Final   • Buprenorphine, Screen, Urine 09/14/2022 Negative  Negative Final   Lab on 09/07/2022   Component Date Value Ref Range Status   • Ethanol, Urine 09/07/2022 Negative  Cutoff=0.020 % Final   • THC, Screen, Urine 09/07/2022 Negative  Negative Final   • Phencyclidine (PCP), Urine 09/07/2022 Negative  Negative Final   • Cocaine Screen, Urine 09/07/2022 Negative  Negative Final   • Methamphetamine, Ur 09/07/2022 Negative  Negative Final   • Opiate Screen 09/07/2022 Negative  Negative Final   • Amphetamine Screen, Urine 09/07/2022 Negative  Negative Final   • Benzodiazepine Screen, Urine 09/07/2022 Negative  Negative Final   • Tricyclic Antidepressants Screen 09/07/2022 Negative  " Negative Final   • Methadone Screen, Urine 09/07/2022 Positive (A) Negative Final   • Barbiturates Screen, Urine 09/07/2022 Negative  Negative Final   • Oxycodone Screen, Urine 09/07/2022 Negative  Negative Final   • Propoxyphene Screen 09/07/2022 Negative  Negative Final   • Buprenorphine, Screen, Urine 09/07/2022 Negative  Negative Final   • Methadone 09/07/2022 +POSITIVE+   Final   • Methadone, Quantitative 09/07/2022 >78875  ng/mg creat Final   • EDDP 09/07/2022 >94575  ng/mg creat Final   • Level of Detection: 09/07/2022 Comment   Final    Testing Threshold:  50 ng/mL                                                                       '  This test was developed and its performance characteristics  determined by [facility].  It has not been cleared or approved  by the Food and Drug Administration.   Lab on 08/30/2022   Component Date Value Ref Range Status   • Ethanol, Urine 08/30/2022 Negative  Cutoff=0.020 % Final   • THC, Screen, Urine 08/30/2022 Negative  Negative Final   • Phencyclidine (PCP), Urine 08/30/2022 Negative  Negative Final   • Cocaine Screen, Urine 08/30/2022 Negative  Negative Final   • Methamphetamine, Ur 08/30/2022 Negative  Negative Final   • Opiate Screen 08/30/2022 Negative  Negative Final   • Amphetamine Screen, Urine 08/30/2022 Negative  Negative Final   • Benzodiazepine Screen, Urine 08/30/2022 Negative  Negative Final   • Tricyclic Antidepressants Screen 08/30/2022 Negative  Negative Final   • Methadone Screen, Urine 08/30/2022 Positive (A) Negative Final   • Barbiturates Screen, Urine 08/30/2022 Negative  Negative Final   • Oxycodone Screen, Urine 08/30/2022 Negative  Negative Final   • Propoxyphene Screen 08/30/2022 Negative  Negative Final   • Buprenorphine, Screen, Urine 08/30/2022 Negative  Negative Final   • Methadone 08/30/2022 +POSITIVE+   Final   • Methadone, Quantitative 08/30/2022 98153  ng/mg creat Final   • EDDP 08/30/2022 62309  ng/mg creat Final   • Level of Detection:  08/30/2022 Comment   Final    Testing Threshold:  50 ng/mL                                                                       '  This test was developed and its performance characteristics  determined by [facility].  It has not been cleared or approved  by the Food and Drug Administration.   Lab on 08/29/2022   Component Date Value Ref Range Status   • Ethanol, Urine 08/29/2022 Negative  Cutoff=0.020 % Final   • THC, Screen, Urine 08/29/2022 Negative  Negative Final   • Phencyclidine (PCP), Urine 08/29/2022 Negative  Negative Final   • Cocaine Screen, Urine 08/29/2022 Negative  Negative Final   • Methamphetamine, Ur 08/29/2022 Negative  Negative Final   • Opiate Screen 08/29/2022 Negative  Negative Final   • Amphetamine Screen, Urine 08/29/2022 Negative  Negative Final   • Benzodiazepine Screen, Urine 08/29/2022 Negative  Negative Final   • Tricyclic Antidepressants Screen 08/29/2022 Negative  Negative Final   • Methadone Screen, Urine 08/29/2022 Positive (A) Negative Final   • Barbiturates Screen, Urine 08/29/2022 Negative  Negative Final   • Oxycodone Screen, Urine 08/29/2022 Negative  Negative Final   • Propoxyphene Screen 08/29/2022 Negative  Negative Final   • Buprenorphine, Screen, Urine 08/29/2022 Negative  Negative Final   • Methadone 08/29/2022 +POSITIVE+   Final   • Methadone, Quantitative 08/29/2022 4928  ng/mg creat Final   • EDDP 08/29/2022 63637  ng/mg creat Final   • Level of Detection: 08/29/2022 Comment   Final    Testing Threshold:  50 ng/mL                                                                       '  This test was developed and its performance characteristics  determined by [facility].  It has not been cleared or approved  by the Food and Drug Administration.       Mental Status Exam  Hygiene:  good  Dress: casual  Attitude: cooperative and agreeable   Motor Activity: appropriate  Eye Contact:  good  Speech: regular rate and rhythm   Mood:  calm and cooperative  Affect:   Appropriate  Thought Processes:  Linear  Thought Content:  Normal  Suicidal Thoughts:  denies  Homicidal Thoughts:  denies  Crisis Safety Plan: Safety plan has been discussed.   Hallucinations: Unknown to clinician.   Reliability: fair  Insight: fair  Judgement: fair  Impulse Control: fair    Recovery/spiritual support group attendance: No     Progress toward goal: Not at goal    Prognosis: Fair with Ongoing Treatment     Self-reported number of days sober: Patient reported 174 days during check in.     Patient will contact this office, call 911 or present to the nearest emergency room should suicidal or homicidal ideations occur.    Impression/Formulation:  No diagnosis found.    Clinical Maneuvering/Interventions: Therapist utilized a person-centered approach to build rapport with group member. Therapist implemented motivational interviewing techniques to assist client with exploring and resolving ambivalence associated with commitment to change behaviors related to substance use and addiction. Therapist applied cognitive behavioral strategies to facilitate identification of maladaptive patterns of thinking and behavior that contribute to client's risk for continued substance use and relapse. Therapist employed group interaction activities to build rapport among group members, promote sobriety, and emphasize relapse prevention. Therapist promoted safe nonjudgmental environment by providing group members with unconditional positive regard and encouraging group members to comply with group rules and guidelines. Therapist assisted group member with identifying and implementing healthier coping strategies.      Plan:  Continue [facility] IOP Phase II  Aftercare:  [facility] Phase III  Program Assignments:  Personal recovery plan, relapse prevention plan, attendance of recovery support group meetings, exploration of sponsorship, drug/alcohol screens.     Amor  CARTER Tabares  9/20/2022  13:33 EDT

## 2022-09-21 ENCOUNTER — LAB (OUTPATIENT)
Dept: LAB | Facility: HOSPITAL | Age: 37
End: 2022-09-21

## 2022-09-21 DIAGNOSIS — F15.20 METHAMPHETAMINE USE DISORDER, SEVERE, DEPENDENCE: ICD-10-CM

## 2022-09-21 DIAGNOSIS — F11.20 OPIOID USE DISORDER, SEVERE, DEPENDENCE: ICD-10-CM

## 2022-09-21 PROCEDURE — G0480 DRUG TEST DEF 1-7 CLASSES: HCPCS

## 2022-09-21 PROCEDURE — 80307 DRUG TEST PRSMV CHEM ANLYZR: CPT

## 2022-09-22 ENCOUNTER — TELEMEDICINE (OUTPATIENT)
Dept: PSYCHIATRY | Facility: CLINIC | Age: 37
End: 2022-09-22

## 2022-09-22 LAB
EDDP/CREAT UR: >8696 NG/MG CREAT
ETHANOL UR-MCNC: NEGATIVE %
LEVEL OF DETECTION:: NORMAL
METHADONE UR QL CFM: NORMAL
METHADONE/CREAT UR: >8696 NG/MG CREAT

## 2022-09-26 ENCOUNTER — TELEMEDICINE (OUTPATIENT)
Dept: PSYCHIATRY | Facility: CLINIC | Age: 37
End: 2022-09-26

## 2022-09-29 ENCOUNTER — LAB (OUTPATIENT)
Dept: LAB | Facility: HOSPITAL | Age: 37
End: 2022-09-29

## 2022-09-29 DIAGNOSIS — F11.20 OPIOID USE DISORDER, SEVERE, DEPENDENCE: ICD-10-CM

## 2022-09-29 DIAGNOSIS — R53.83 FATIGUE, UNSPECIFIED TYPE: ICD-10-CM

## 2022-09-29 DIAGNOSIS — F41.1 GENERALIZED ANXIETY DISORDER: ICD-10-CM

## 2022-09-29 DIAGNOSIS — F15.20 METHAMPHETAMINE USE DISORDER, SEVERE, DEPENDENCE: ICD-10-CM

## 2022-09-29 LAB
25(OH)D3 SERPL-MCNC: 37.9 NG/ML (ref 30–100)
ALBUMIN SERPL-MCNC: 4.7 G/DL (ref 3.5–5.2)
ALBUMIN/GLOB SERPL: 2.8 G/DL
ALP SERPL-CCNC: 43 U/L (ref 39–117)
ALT SERPL W P-5'-P-CCNC: 6 U/L (ref 1–33)
AMPHET+METHAMPHET UR QL: NEGATIVE
AMPHETAMINES UR QL: NEGATIVE
ANION GAP SERPL CALCULATED.3IONS-SCNC: 9.9 MMOL/L (ref 5–15)
AST SERPL-CCNC: 18 U/L (ref 1–32)
BARBITURATES UR QL SCN: NEGATIVE
BASOPHILS # BLD AUTO: 0.06 10*3/MM3 (ref 0–0.2)
BASOPHILS NFR BLD AUTO: 0.6 % (ref 0–1.5)
BENZODIAZ UR QL SCN: NEGATIVE
BILIRUB SERPL-MCNC: 0.5 MG/DL (ref 0–1.2)
BUN SERPL-MCNC: 10 MG/DL (ref 6–20)
BUN/CREAT SERPL: 13.7 (ref 7–25)
BUPRENORPHINE SERPL-MCNC: NEGATIVE NG/ML
CALCIUM SPEC-SCNC: 9.5 MG/DL (ref 8.6–10.5)
CANNABINOIDS SERPL QL: NEGATIVE
CHLORIDE SERPL-SCNC: 104 MMOL/L (ref 98–107)
CO2 SERPL-SCNC: 27.1 MMOL/L (ref 22–29)
COCAINE UR QL: NEGATIVE
CREAT SERPL-MCNC: 0.73 MG/DL (ref 0.57–1)
DEPRECATED RDW RBC AUTO: 43.5 FL (ref 37–54)
EGFRCR SERPLBLD CKD-EPI 2021: 110.1 ML/MIN/1.73
EOSINOPHIL # BLD AUTO: 0.15 10*3/MM3 (ref 0–0.4)
EOSINOPHIL NFR BLD AUTO: 1.4 % (ref 0.3–6.2)
ERYTHROCYTE [DISTWIDTH] IN BLOOD BY AUTOMATED COUNT: 13.7 % (ref 12.3–15.4)
GLOBULIN UR ELPH-MCNC: 1.7 GM/DL
GLUCOSE SERPL-MCNC: 107 MG/DL (ref 65–99)
HCT VFR BLD AUTO: 41.3 % (ref 34–46.6)
HGB BLD-MCNC: 14.1 G/DL (ref 12–15.9)
IMM GRANULOCYTES # BLD AUTO: 0.02 10*3/MM3 (ref 0–0.05)
IMM GRANULOCYTES NFR BLD AUTO: 0.2 % (ref 0–0.5)
LYMPHOCYTES # BLD AUTO: 2.11 10*3/MM3 (ref 0.7–3.1)
LYMPHOCYTES NFR BLD AUTO: 19.8 % (ref 19.6–45.3)
MCH RBC QN AUTO: 29.3 PG (ref 26.6–33)
MCHC RBC AUTO-ENTMCNC: 34.1 G/DL (ref 31.5–35.7)
MCV RBC AUTO: 85.7 FL (ref 79–97)
METHADONE UR QL SCN: POSITIVE
MONOCYTES # BLD AUTO: 0.38 10*3/MM3 (ref 0.1–0.9)
MONOCYTES NFR BLD AUTO: 3.6 % (ref 5–12)
NEUTROPHILS NFR BLD AUTO: 7.91 10*3/MM3 (ref 1.7–7)
NEUTROPHILS NFR BLD AUTO: 74.4 % (ref 42.7–76)
NRBC BLD AUTO-RTO: 0 /100 WBC (ref 0–0.2)
OPIATES UR QL: NEGATIVE
OXYCODONE UR QL SCN: NEGATIVE
PCP UR QL SCN: NEGATIVE
PLATELET # BLD AUTO: 315 10*3/MM3 (ref 140–450)
PMV BLD AUTO: 9.4 FL (ref 6–12)
POTASSIUM SERPL-SCNC: 3.9 MMOL/L (ref 3.5–5.2)
PROPOXYPH UR QL: NEGATIVE
PROT SERPL-MCNC: 6.4 G/DL (ref 6–8.5)
RBC # BLD AUTO: 4.82 10*6/MM3 (ref 3.77–5.28)
SODIUM SERPL-SCNC: 141 MMOL/L (ref 136–145)
TRICYCLICS UR QL SCN: NEGATIVE
TSH SERPL DL<=0.05 MIU/L-ACNC: 1.29 UIU/ML (ref 0.27–4.2)
VIT B12 BLD-MCNC: 548 PG/ML (ref 211–946)
WBC NRBC COR # BLD: 10.63 10*3/MM3 (ref 3.4–10.8)

## 2022-09-29 PROCEDURE — 80307 DRUG TEST PRSMV CHEM ANLYZR: CPT

## 2022-09-29 PROCEDURE — 82306 VITAMIN D 25 HYDROXY: CPT

## 2022-09-29 PROCEDURE — 85025 COMPLETE CBC W/AUTO DIFF WBC: CPT

## 2022-09-29 PROCEDURE — 80053 COMPREHEN METABOLIC PANEL: CPT

## 2022-09-29 PROCEDURE — 36415 COLL VENOUS BLD VENIPUNCTURE: CPT

## 2022-09-29 PROCEDURE — 82607 VITAMIN B-12: CPT

## 2022-09-29 PROCEDURE — G0480 DRUG TEST DEF 1-7 CLASSES: HCPCS

## 2022-09-29 PROCEDURE — 84443 ASSAY THYROID STIM HORMONE: CPT

## 2022-09-30 LAB — ETHANOL UR-MCNC: NEGATIVE %

## 2022-10-01 LAB
EDDP/CREAT UR: >8197 NG/MG CREAT
LEVEL OF DETECTION:: NORMAL
METHADONE UR QL CFM: NORMAL
METHADONE/CREAT UR: 2382 NG/MG CREAT

## 2022-10-03 ENCOUNTER — LAB (OUTPATIENT)
Dept: LAB | Facility: HOSPITAL | Age: 37
End: 2022-10-03

## 2022-10-03 DIAGNOSIS — F15.20 METHAMPHETAMINE USE DISORDER, SEVERE, DEPENDENCE: ICD-10-CM

## 2022-10-03 DIAGNOSIS — F11.20 OPIOID USE DISORDER, SEVERE, DEPENDENCE: ICD-10-CM

## 2022-10-03 PROCEDURE — G0480 DRUG TEST DEF 1-7 CLASSES: HCPCS

## 2022-10-03 PROCEDURE — 80307 DRUG TEST PRSMV CHEM ANLYZR: CPT

## 2022-10-04 LAB — ETHANOL UR-MCNC: NEGATIVE %

## 2022-10-09 LAB
EDDP/CREAT UR: >9346 NG/MG CREAT
LEVEL OF DETECTION:: NORMAL
METHADONE UR QL CFM: NORMAL
METHADONE/CREAT UR: >9346 NG/MG CREAT

## 2022-10-10 LAB
EDDP/CREAT UR: >6329 NG/MG CREAT
LEVEL OF DETECTION:: NORMAL
METHADONE UR QL CFM: NORMAL
METHADONE/CREAT UR: 3811 NG/MG CREAT

## 2022-10-10 NOTE — PROGRESS NOTES
CD IOP GROUP     Date: 09/22/2022  Name: Ada Vogt    Time In: 1530   Time Out: 1630    This provider is located at [facility] located at [address] [place] [address], [address] [place] [id number].  The Patient is seen remotely at his/her home, using Zoom. Patient is being seen via telehealth and confirm that they are in a secure environment for this session. The patient's condition being diagnosed/treated is appropriate for telemedicine. The provider identified himself as well as his credentials. The patient gave consent to be seen remotely, and when consent is given they understand that the consent allows for patient identifiable information to be sent to a third party as needed. They may refuse to be seen remotely at any time. The electronic data is encrypted and password protected, and the patient has been advised of the potential risks to privacy not withstanding such measures.      Number of participants: 12    IOP GROUP NOTE     Data: 1 hour IOP group therapy session (Check-ins, Coping Skills, Relapse Prevention)     Check Ins: Therapist continued facilitation of rapport building strategies between group members. Therapist asked that each patient check in with home life and recovery efforts and identify triggers, cravings, and high risk situations that arise between group sessions. Therapist provided empathy and support during group session.     Session Content/Coping Skills: Check ins completed by group members. Clinician explored with group members what they felt would be helpful topics to explore during upcoming sessions. Clinician discussed aftercare with group members. Clinician explored with group members rewarding yourself in recovery.     Response: Patient attended class via Zoom. Patient participated in verbal completion of check in form.   Patient during check in denied suicidal or homicidal thoughts. Patient participated in group discussions.      Personal Assessment 0-10 Scale (10  worst)    Anxiety:  0   Depression:  0   Cravings: 0     Assessment:     ..  Lab on 09/21/2022   Component Date Value Ref Range Status   • Ethanol, Urine 09/21/2022 Negative  Cutoff=0.020 % Final   • THC, Screen, Urine 09/21/2022 Negative  Negative Final   • Phencyclidine (PCP), Urine 09/21/2022 Negative  Negative Final   • Cocaine Screen, Urine 09/21/2022 Negative  Negative Final   • Methamphetamine, Ur 09/21/2022 Negative  Negative Final   • Opiate Screen 09/21/2022 Negative  Negative Final   • Amphetamine Screen, Urine 09/21/2022 Negative  Negative Final   • Benzodiazepine Screen, Urine 09/21/2022 Negative  Negative Final   • Tricyclic Antidepressants Screen 09/21/2022 Negative  Negative Final   • Methadone Screen, Urine 09/21/2022 Positive (A)  Negative Final   • Barbiturates Screen, Urine 09/21/2022 Negative  Negative Final   • Oxycodone Screen, Urine 09/21/2022 Negative  Negative Final   • Propoxyphene Screen 09/21/2022 Negative  Negative Final   • Buprenorphine, Screen, Urine 09/21/2022 Negative  Negative Final   • Methadone 09/21/2022 +POSITIVE+   Final   • Methadone, Quantitative 09/21/2022 2382  ng/mg creat Final   • EDDP 09/21/2022 >8197  ng/mg creat Final   • Level of Detection: 09/21/2022 Comment   Final    Testing Threshold:  50 ng/mL                                                                       '  This test was developed and its performance characteristics  determined by [facility].  It has not been cleared or approved  by the Food and Drug Administration.   Lab on 09/14/2022   Component Date Value Ref Range Status   • Ethanol, Urine 09/14/2022 Negative  Cutoff=0.020 % Final   • THC, Screen, Urine 09/14/2022 Negative  Negative Final   • Phencyclidine (PCP), Urine 09/14/2022 Negative  Negative Final   • Cocaine Screen, Urine 09/14/2022 Negative  Negative Final   • Methamphetamine, Ur 09/14/2022 Negative  Negative Final   • Opiate Screen 09/14/2022 Negative  Negative Final   • Amphetamine Screen,  Urine 09/14/2022 Negative  Negative Final   • Benzodiazepine Screen, Urine 09/14/2022 Negative  Negative Final   • Tricyclic Antidepressants Screen 09/14/2022 Negative  Negative Final   • Methadone Screen, Urine 09/14/2022 Positive (A)  Negative Final   • Barbiturates Screen, Urine 09/14/2022 Negative  Negative Final   • Oxycodone Screen, Urine 09/14/2022 Negative  Negative Final   • Propoxyphene Screen 09/14/2022 Negative  Negative Final   • Buprenorphine, Screen, Urine 09/14/2022 Negative  Negative Final   • Methadone 09/14/2022 +POSITIVE+   Final   • Methadone, Quantitative 09/14/2022 >8696  ng/mg creat Final   • EDDP 09/14/2022 >8696  ng/mg creat Final   • Level of Detection: 09/14/2022 Comment   Final    Testing Threshold:  50 ng/mL                                                                       '  This test was developed and its performance characteristics  determined by [facility].  It has not been cleared or approved  by the Food and Drug Administration.   Lab on 09/07/2022   Component Date Value Ref Range Status   • 25 Hydroxy, Vitamin D 09/29/2022 37.9  30.0 - 100.0 ng/ml Final   • Ethanol, Urine 09/07/2022 Negative  Cutoff=0.020 % Final   • THC, Screen, Urine 09/07/2022 Negative  Negative Final   • Phencyclidine (PCP), Urine 09/07/2022 Negative  Negative Final   • Cocaine Screen, Urine 09/07/2022 Negative  Negative Final   • Methamphetamine, Ur 09/07/2022 Negative  Negative Final   • Opiate Screen 09/07/2022 Negative  Negative Final   • Amphetamine Screen, Urine 09/07/2022 Negative  Negative Final   • Benzodiazepine Screen, Urine 09/07/2022 Negative  Negative Final   • Tricyclic Antidepressants Screen 09/07/2022 Negative  Negative Final   • Methadone Screen, Urine 09/07/2022 Positive (A)  Negative Final   • Barbiturates Screen, Urine 09/07/2022 Negative  Negative Final   • Oxycodone Screen, Urine 09/07/2022 Negative  Negative Final   • Propoxyphene Screen 09/07/2022 Negative  Negative Final   •  Buprenorphine, Screen, Urine 09/07/2022 Negative  Negative Final   • Methadone 09/07/2022 +POSITIVE+   Final   • Methadone, Quantitative 09/07/2022 >19803  ng/mg creat Final   • EDDP 09/07/2022 >36413  ng/mg creat Final   • Level of Detection: 09/07/2022 Comment   Final    Testing Threshold:  50 ng/mL                                                                       '  This test was developed and its performance characteristics  determined by [facility]  It has not been cleared or approved  by the Food and Drug Administration.       Mental Status Exam  Hygiene:  good  Dress: casual  Attitude: cooperative and agreeable   Motor Activity: appropriate  Eye Contact:  good  Speech: regular rate and rhythm   Mood:  calm and cooperative  Affect:  Appropriate  Thought Processes:  Linear  Thought Content:  Normal  Suicidal Thoughts:  denies  Homicidal Thoughts:  denies  Crisis Safety Plan: Safety plan has been discussed.   Hallucinations: Unknown to clinician.   Reliability: fair  Insight: fair  Judgement: fair  Impulse Control: fair    Recovery/spiritual support group attendance: No     Progress toward goal: Not at goal    Prognosis: Fair with Ongoing Treatment     Self-reported number of days sober:  Patient reported 181 days during check in.     Patient will contact this office, call 911 or present to the nearest emergency room should suicidal or homicidal ideations occur.    Impression/Formulation:  No diagnosis found.    Clinical Maneuvering/Interventions: Therapist utilized a person-centered approach to build rapport with group member. Therapist implemented motivational interviewing techniques to assist client with exploring and resolving ambivalence associated with commitment to change behaviors related to substance use and addiction. Therapist applied cognitive behavioral strategies to facilitate identification of maladaptive patterns of thinking and behavior that contribute to client's risk for continued substance  use and relapse. Therapist employed group interaction activities to build rapport among group members, promote sobriety, and emphasize relapse prevention. Therapist promoted safe nonjudgmental environment by providing group members with unconditional positive regard and encouraging group members to comply with group rules and guidelines. Therapist assisted group member with identifying and implementing healthier coping strategies.      Plan:  Continue [facility] IOP Phase III  Aftercare:  [facility] Phase III  Program Assignments:  Personal recovery plan, relapse prevention plan, attendance of recovery support group meetings, exploration of sponsorship, drug/alcohol screens.     Amor Tabares LCSW  10/10/2022  14:01 EDT

## 2022-10-12 NOTE — PROGRESS NOTES
CD IOP GROUP     Date: 09/26/2022  Name: Ada Vogt    Time In: 1530   Time Out: 1630    This provider is located at [facility] located at [address] [place] [address], [address] [place] [id number].  The Patient is seen remotely at his/her home, using Zoom. Patient is being seen via telehealth and confirm that they are in a secure environment for this session. The patient's condition being diagnosed/treated is appropriate for telemedicine. The provider identified himself as well as his credentials. The patient gave consent to be seen remotely, and when consent is given they understand that the consent allows for patient identifiable information to be sent to a third party as needed. They may refuse to be seen remotely at any time. The electronic data is encrypted and password protected, and the patient has been advised of the potential risks to privacy not withstanding such measures.      Number of participants: 12    IOP GROUP NOTE     Data: 1 hour IOP group therapy session (Check-ins, Coping Skills, Relapse Prevention)     Check Ins: Therapist continued facilitation of rapport building strategies between group members. Therapist asked that each patient check in with home life and recovery efforts and identify triggers, cravings, and high risk situations that arise between group sessions. Therapist provided empathy and support during group session.     Session Content/Coping Skills: Check ins completed by group members. Clinician processed stressors presented by group members. What are Personal Boundaries Therapist Aid psychoeducational material (Page 1) reviewed and discussed. Clinician discussed setting boundaries with group members.      Response: Patient attended class via Zoom. Patient participated in verbal completion of check in form.   Patient during check in denied suicidal or homicidal thoughts. Patient participated in group discussions.      Personal Assessment 0-10 Scale (10 worst)    Anxiety:  7    Depression:  4   Cravings: 3     Assessment:     ..  Lab on 09/21/2022   Component Date Value Ref Range Status   • Ethanol, Urine 09/21/2022 Negative  Cutoff=0.020 % Final   • THC, Screen, Urine 09/21/2022 Negative  Negative Final   • Phencyclidine (PCP), Urine 09/21/2022 Negative  Negative Final   • Cocaine Screen, Urine 09/21/2022 Negative  Negative Final   • Methamphetamine, Ur 09/21/2022 Negative  Negative Final   • Opiate Screen 09/21/2022 Negative  Negative Final   • Amphetamine Screen, Urine 09/21/2022 Negative  Negative Final   • Benzodiazepine Screen, Urine 09/21/2022 Negative  Negative Final   • Tricyclic Antidepressants Screen 09/21/2022 Negative  Negative Final   • Methadone Screen, Urine 09/21/2022 Positive (A)  Negative Final   • Barbiturates Screen, Urine 09/21/2022 Negative  Negative Final   • Oxycodone Screen, Urine 09/21/2022 Negative  Negative Final   • Propoxyphene Screen 09/21/2022 Negative  Negative Final   • Buprenorphine, Screen, Urine 09/21/2022 Negative  Negative Final   • Methadone 09/21/2022 +POSITIVE+   Final   • Methadone, Quantitative 09/21/2022 2382  ng/mg creat Final   • EDDP 09/21/2022 >8197  ng/mg creat Final   • Level of Detection: 09/21/2022 Comment   Final    Testing Threshold:  50 ng/mL                                                                       '  This test was developed and its performance characteristics  determined by [facility].  It has not been cleared or approved  by the Food and Drug Administration.   Lab on 09/14/2022   Component Date Value Ref Range Status   • Ethanol, Urine 09/14/2022 Negative  Cutoff=0.020 % Final   • THC, Screen, Urine 09/14/2022 Negative  Negative Final   • Phencyclidine (PCP), Urine 09/14/2022 Negative  Negative Final   • Cocaine Screen, Urine 09/14/2022 Negative  Negative Final   • Methamphetamine, Ur 09/14/2022 Negative  Negative Final   • Opiate Screen 09/14/2022 Negative  Negative Final   • Amphetamine Screen, Urine 09/14/2022  Negative  Negative Final   • Benzodiazepine Screen, Urine 09/14/2022 Negative  Negative Final   • Tricyclic Antidepressants Screen 09/14/2022 Negative  Negative Final   • Methadone Screen, Urine 09/14/2022 Positive (A)  Negative Final   • Barbiturates Screen, Urine 09/14/2022 Negative  Negative Final   • Oxycodone Screen, Urine 09/14/2022 Negative  Negative Final   • Propoxyphene Screen 09/14/2022 Negative  Negative Final   • Buprenorphine, Screen, Urine 09/14/2022 Negative  Negative Final   • Methadone 09/14/2022 +POSITIVE+   Final   • Methadone, Quantitative 09/14/2022 >8696  ng/mg creat Final   • EDDP 09/14/2022 >8696  ng/mg creat Final   • Level of Detection: 09/14/2022 Comment   Final    Testing Threshold:  50 ng/mL                                                                       '  This test was developed and its performance characteristics  determined by [facility].  It has not been cleared or approved  by the Food and Drug Administration.   Lab on 09/07/2022   Component Date Value Ref Range Status   • 25 Hydroxy, Vitamin D 09/29/2022 37.9  30.0 - 100.0 ng/ml Final   • Ethanol, Urine 09/07/2022 Negative  Cutoff=0.020 % Final   • THC, Screen, Urine 09/07/2022 Negative  Negative Final   • Phencyclidine (PCP), Urine 09/07/2022 Negative  Negative Final   • Cocaine Screen, Urine 09/07/2022 Negative  Negative Final   • Methamphetamine, Ur 09/07/2022 Negative  Negative Final   • Opiate Screen 09/07/2022 Negative  Negative Final   • Amphetamine Screen, Urine 09/07/2022 Negative  Negative Final   • Benzodiazepine Screen, Urine 09/07/2022 Negative  Negative Final   • Tricyclic Antidepressants Screen 09/07/2022 Negative  Negative Final   • Methadone Screen, Urine 09/07/2022 Positive (A)  Negative Final   • Barbiturates Screen, Urine 09/07/2022 Negative  Negative Final   • Oxycodone Screen, Urine 09/07/2022 Negative  Negative Final   • Propoxyphene Screen 09/07/2022 Negative  Negative Final   • Buprenorphine, Screen,  Urine 09/07/2022 Negative  Negative Final   • Methadone 09/07/2022 +POSITIVE+   Final   • Methadone, Quantitative 09/07/2022 >43246  ng/mg creat Final   • EDDP 09/07/2022 >52981  ng/mg creat Final   • Level of Detection: 09/07/2022 Comment   Final    Testing Threshold:  50 ng/mL                                                                       '  This test was developed and its performance characteristics  determined by [facility].  It has not been cleared or approved  by the Food and Drug Administration.       Mental Status Exam  Hygiene:  good  Dress: casual  Attitude: cooperative and agreeable   Motor Activity: appropriate  Eye Contact:  good  Speech: regular rate and rhythm   Mood:  calm and cooperative  Affect:  Appropriate  Thought Processes:  Linear  Thought Content:  Normal  Suicidal Thoughts:  denies  Homicidal Thoughts:  denies  Crisis Safety Plan: Safety plan has been discussed.   Hallucinations: Unknown to clinician.   Reliability: fair  Insight: fair  Judgement: fair  Impulse Control: fair    Recovery/spiritual support group attendance: Patient reported during check in that she had attended group counseling.     Progress toward goal: Not at goal    Prognosis: Fair with Ongoing Treatment     Self-reported number of days sober: Patient reported 185 days during check in.     Patient will contact this office, call 911 or present to the nearest emergency room should suicidal or homicidal ideations occur.    Impression/Formulation:  No diagnosis found.    Clinical Maneuvering/Interventions: Therapist utilized a person-centered approach to build rapport with group member. Therapist implemented motivational interviewing techniques to assist client with exploring and resolving ambivalence associated with commitment to change behaviors related to substance use and addiction. Therapist applied cognitive behavioral strategies to facilitate identification of maladaptive patterns of thinking and behavior that  contribute to client's risk for continued substance use and relapse. Therapist employed group interaction activities to build rapport among group members, promote sobriety, and emphasize relapse prevention. Therapist promoted safe nonjudgmental environment by providing group members with unconditional positive regard and encouraging group members to comply with group rules and guidelines. Therapist assisted group member with identifying and implementing healthier coping strategies.      Plan:  Continue [facility] IOP Phase II  Aftercare:  [facility] Phase III  Program Assignments:  Personal recovery plan, relapse prevention plan, attendance of recovery support group meetings, exploration of sponsorship, drug/alcohol screens.     Amor Tabares LCSW  10/12/2022  08:16 EDT

## 2022-10-13 ENCOUNTER — TELEMEDICINE (OUTPATIENT)
Dept: PSYCHIATRY | Facility: CLINIC | Age: 37
End: 2022-10-13

## 2022-10-14 ENCOUNTER — LAB (OUTPATIENT)
Dept: LAB | Facility: HOSPITAL | Age: 37
End: 2022-10-14

## 2022-10-14 DIAGNOSIS — F11.20 OPIOID USE DISORDER, SEVERE, DEPENDENCE: ICD-10-CM

## 2022-10-14 DIAGNOSIS — F15.20 METHAMPHETAMINE USE DISORDER, SEVERE, DEPENDENCE: ICD-10-CM

## 2022-10-14 PROCEDURE — G0480 DRUG TEST DEF 1-7 CLASSES: HCPCS

## 2022-10-14 PROCEDURE — 80307 DRUG TEST PRSMV CHEM ANLYZR: CPT

## 2022-10-15 LAB — ETHANOL UR-MCNC: NEGATIVE %

## 2022-10-19 NOTE — PROGRESS NOTES
[facility] PHASE II / Phase III GROUP NOTE    Name: Ada Vogt  Date: October 19, 2022    This provider is located at [facility] located at [address] [place] [address], [address] [place] [address].  The Patient is seen remotely at his/her home, using Zoom. Patient is being seen via telehealth and confirm that they are in a secure environment for this session. The patient's condition being diagnosed/treated is appropriate for telemedicine. The provider identified himself as well as his credentials. The patient gave consent to be seen remotely, and when consent is given they understand that the consent allows for patient identifiable information to be sent to a third party as needed. They may refuse to be seen remotely at any time. The electronic data is encrypted and password protected, and the patient has been advised of the potential risks to privacy not withstanding such measures.    Time in: 1530  Time out: 1630    Data: 1 hour group therapy session     Clinical Maneuvering/Interventions: Check ins completed. What’s on your phone CD-IOP icebreaker activity.     Response: Patient attended class via Zoom. Patient participated in verbal completion of check in form.   Patient during check in denied suicidal or homicidal thoughts. Patient participated in group discussions.      On a 1:10 Scale (0-none, 10-high):    Anxiety: 6  Depression: 3  Cravings: 2        Assessment     There are no diagnoses linked to this encounter.             Progress toward goal: Not at goal    Functional Status: No impairment    Prognosis: Fair with Ongoing Treatment     Assessment:  Engaged in activity/Process and self-disclosed: Yes  Affect:Appropriate   Applies topic to self: Yes  Able to give and receive feedback: Yes      Urinalysis: 10/13 positive for methadone.   Labs:  No results found for: UDSCTRL, UDS     Self-Reported days since last use: Patient reported 202 days during check in.     Recovery/spiritual support group  attendance:: Patient reported that she had attended two relapse prevention groups at clinic.      Plan:   Patient will continue in IOP Phase two and three group.      Safety plan has previously been discussed with patient.     Amor Tabares LCSW  10/19/2022    08:55 EDT

## 2022-10-20 ENCOUNTER — LAB (OUTPATIENT)
Dept: LAB | Facility: HOSPITAL | Age: 37
End: 2022-10-20

## 2022-10-20 ENCOUNTER — TELEMEDICINE (OUTPATIENT)
Dept: PSYCHIATRY | Facility: CLINIC | Age: 37
End: 2022-10-20

## 2022-10-20 DIAGNOSIS — F11.21 OPIOID USE DISORDER, SEVERE, IN EARLY REMISSION, DEPENDENCE: ICD-10-CM

## 2022-10-20 DIAGNOSIS — F15.21 METHAMPHETAMINE USE DISORDER, SEVERE, IN EARLY REMISSION: Primary | ICD-10-CM

## 2022-10-20 DIAGNOSIS — F11.20 OPIOID USE DISORDER, SEVERE, DEPENDENCE: ICD-10-CM

## 2022-10-20 DIAGNOSIS — F15.20 METHAMPHETAMINE USE DISORDER, SEVERE, DEPENDENCE: ICD-10-CM

## 2022-10-20 PROCEDURE — 80307 DRUG TEST PRSMV CHEM ANLYZR: CPT

## 2022-10-20 PROCEDURE — 90853 GROUP PSYCHOTHERAPY: CPT | Performed by: SOCIAL WORKER

## 2022-10-20 PROCEDURE — G0480 DRUG TEST DEF 1-7 CLASSES: HCPCS

## 2022-10-21 LAB — ETHANOL UR-MCNC: NEGATIVE %

## 2022-10-23 LAB
EDDP/CREAT UR: >7634 NG/MG CREAT
LEVEL OF DETECTION:: NORMAL
METHADONE UR QL CFM: NORMAL
METHADONE/CREAT UR: 7180 NG/MG CREAT

## 2022-10-27 ENCOUNTER — TELEMEDICINE (OUTPATIENT)
Dept: PSYCHIATRY | Facility: CLINIC | Age: 37
End: 2022-10-27

## 2022-10-27 ENCOUNTER — LAB (OUTPATIENT)
Dept: LAB | Facility: HOSPITAL | Age: 37
End: 2022-10-27

## 2022-10-27 DIAGNOSIS — F11.20 OPIOID USE DISORDER, SEVERE, DEPENDENCE: ICD-10-CM

## 2022-10-27 DIAGNOSIS — F15.21 METHAMPHETAMINE USE DISORDER, SEVERE, IN EARLY REMISSION: Primary | ICD-10-CM

## 2022-10-27 DIAGNOSIS — F15.20 METHAMPHETAMINE USE DISORDER, SEVERE, DEPENDENCE: ICD-10-CM

## 2022-10-27 DIAGNOSIS — F11.21 OPIOID USE DISORDER, SEVERE, IN EARLY REMISSION, DEPENDENCE: ICD-10-CM

## 2022-10-27 PROCEDURE — G0480 DRUG TEST DEF 1-7 CLASSES: HCPCS

## 2022-10-27 PROCEDURE — 90853 GROUP PSYCHOTHERAPY: CPT | Performed by: SOCIAL WORKER

## 2022-10-27 PROCEDURE — 80307 DRUG TEST PRSMV CHEM ANLYZR: CPT

## 2022-10-28 LAB — ETHANOL UR-MCNC: NEGATIVE %

## 2022-10-29 LAB
EDDP/CREAT UR: >9615 NG/MG CREAT
LEVEL OF DETECTION:: NORMAL
METHADONE UR QL CFM: NORMAL
METHADONE/CREAT UR: >9615 NG/MG CREAT

## 2022-11-01 LAB
EDDP/CREAT UR: 9884 NG/MG CREAT
LEVEL OF DETECTION:: NORMAL
METHADONE UR QL CFM: NORMAL
METHADONE/CREAT UR: 2797 NG/MG CREAT

## 2022-11-02 NOTE — PROGRESS NOTES
CD IOP GROUP     Date: 10/20/2022  Name: Ada Vogt    Time In: 1532   Time Out: 1630    This provider is located at [facility] located at [address] [place] [address].  The Patient is seen remotely at his/her home, using Zoom. Patient is being seen via telehealth and confirm that they are in a secure environment for this session. The patient's condition being diagnosed/treated is appropriate for telemedicine. The provider identified himself as well as his credentials. The patient gave consent to be seen remotely, and when consent is given they understand that the consent allows for patient identifiable information to be sent to a third party as needed. They may refuse to be seen remotely at any time. The electronic data is encrypted and password protected, and the patient has been advised of the potential risks to privacy not withstanding such measures.      Number of participants: 11    IOP GROUP NOTE     Data: 1 hour IOP group therapy session (Check-ins, Coping Skills, Relapse Prevention)     Check Ins: Therapist continued facilitation of rapport building strategies between group members. Therapist asked that each patient check in with home life and recovery efforts and identify triggers, cravings, and high risk situations that arise between group sessions. Therapist provided empathy and support during group session.     Session Content/Coping Skills: Check ins completed by group members. Clinician discussed depression, symptoms of depression, and coping skills for depression with group members. 28 positive affirmations for recovery article provided and discussed (Gail, 2022). Clinician educated group members on the use of affirmations in recovery.     Response: Patient attended class via Zoom. Patient participated in verbal completion of check in form. Patient during check in denied suicidal or homicidal thoughts. Patient reported during check in that she was sleeping too much and  that she talked to MD about medications. Patient reported during check in that she had attended two meetings on in the rooms.     Personal Assessment 0-10 Scale (0-none, 10-high)    Anxiety:  5   Depression:  2   Cravings: 1     Assessment:     ..  Lab on 10/20/2022   Component Date Value Ref Range Status   • Ethanol, Urine 10/20/2022 Negative  Cutoff=0.020 % Final   • THC, Screen, Urine 10/20/2022 Negative  Negative Final   • Phencyclidine (PCP), Urine 10/20/2022 Negative  Negative Final   • Cocaine Screen, Urine 10/20/2022 Negative  Negative Final   • Methamphetamine, Ur 10/20/2022 Negative  Negative Final   • Opiate Screen 10/20/2022 Negative  Negative Final   • Amphetamine Screen, Urine 10/20/2022 Negative  Negative Final   • Benzodiazepine Screen, Urine 10/20/2022 Negative  Negative Final   • Tricyclic Antidepressants Screen 10/20/2022 Negative  Negative Final   • Methadone Screen, Urine 10/20/2022 Positive (A)  Negative Final   • Barbiturates Screen, Urine 10/20/2022 Negative  Negative Final   • Oxycodone Screen, Urine 10/20/2022 Negative  Negative Final   • Propoxyphene Screen 10/20/2022 Negative  Negative Final   • Buprenorphine, Screen, Urine 10/20/2022 Negative  Negative Final   • Methadone 10/20/2022 +POSITIVE+   Final   • Methadone, Quantitative 10/20/2022 >9615  ng/mg creat Final   • EDDP 10/20/2022 >9615  ng/mg creat Final   • Level of Detection: 10/20/2022 Comment   Final    Testing Threshold:  50 ng/mL                                                                       '  This test was developed and its performance characteristics  determined by [facility].  It has not been cleared or approved  by the Food and Drug Administration.   Lab on 10/14/2022   Component Date Value Ref Range Status   • Ethanol, Urine 10/14/2022 Negative  Cutoff=0.020 % Final   • THC, Screen, Urine 10/14/2022 Negative  Negative Final   • Phencyclidine (PCP), Urine 10/14/2022 Negative  Negative Final   • Cocaine Screen, Urine  10/14/2022 Negative  Negative Final   • Methamphetamine, Ur 10/14/2022 Negative  Negative Final   • Opiate Screen 10/14/2022 Negative  Negative Final   • Amphetamine Screen, Urine 10/14/2022 Negative  Negative Final   • Benzodiazepine Screen, Urine 10/14/2022 Negative  Negative Final   • Tricyclic Antidepressants Screen 10/14/2022 Negative  Negative Final   • Methadone Screen, Urine 10/14/2022 Positive (A)  Negative Final   • Barbiturates Screen, Urine 10/14/2022 Negative  Negative Final   • Oxycodone Screen, Urine 10/14/2022 Negative  Negative Final   • Propoxyphene Screen 10/14/2022 Negative  Negative Final   • Buprenorphine, Screen, Urine 10/14/2022 Negative  Negative Final   • Methadone 10/14/2022 +POSITIVE+   Final   • Methadone, Quantitative 10/14/2022 7180  ng/mg creat Final   • EDDP 10/14/2022 >7634  ng/mg creat Final   • Level of Detection: 10/14/2022 Comment   Final    Testing Threshold:  50 ng/mL                                                                       '  This test was developed and its performance characteristics  determined by [facility].  It has not been cleared or approved  by the Food and Drug Administration.   Lab on 10/03/2022   Component Date Value Ref Range Status   • Ethanol, Urine 10/03/2022 Negative  Cutoff=0.020 % Final   • THC, Screen, Urine 10/03/2022 Negative  Negative Final   • Phencyclidine (PCP), Urine 10/03/2022 Negative  Negative Final   • Cocaine Screen, Urine 10/03/2022 Negative  Negative Final   • Methamphetamine, Ur 10/03/2022 Negative  Negative Final   • Opiate Screen 10/03/2022 Negative  Negative Final   • Amphetamine Screen, Urine 10/03/2022 Negative  Negative Final   • Benzodiazepine Screen, Urine 10/03/2022 Negative  Negative Final   • Tricyclic Antidepressants Screen 10/03/2022 Negative  Negative Final   • Methadone Screen, Urine 10/03/2022 Positive (A)  Negative Final   • Barbiturates Screen, Urine 10/03/2022 Negative  Negative Final   • Oxycodone Screen, Urine  10/03/2022 Negative  Negative Final   • Propoxyphene Screen 10/03/2022 Negative  Negative Final   • Buprenorphine, Screen, Urine 10/03/2022 Negative  Negative Final   • Methadone 10/03/2022 +POSITIVE+   Final   • Methadone, Quantitative 10/03/2022 >9346  ng/mg creat Final   • EDDP 10/03/2022 >9346  ng/mg creat Final   • Level of Detection: 10/03/2022 Comment   Final    Testing Threshold:  50 ng/mL                                                                       '  This test was developed and its performance characteristics  determined by [facility].  It has not been cleared or approved  by the Food and Drug Administration.       Mental Status Exam  Hygiene:  good  Dress: casual  Attitude: cooperative and agreeable   Motor Activity: appropriate  Eye Contact:  good  Speech: regular rate and rhythm   Mood:  calm and cooperative  Affect:  Appropriate  Thought Processes:  Linear  Thought Content:  Normal  Suicidal Thoughts:  denies  Homicidal Thoughts:  denies  Crisis Safety Plan: Safety plan has been discussed.   Hallucinations:  Unknown to clinician.   Reliability: fair  Insight: fair  Judgement: fair  Impulse Control: fair    Recovery/spiritual support group attendance: Patient reported during check in that she had attended two meetings on in the rooms.      Progress toward goal: Not at goal    Prognosis: Fair with Ongoing Treatment     Self-reported number of days sober: Patient reported 209 during check in.     Patient will contact this office, call 911 or present to the nearest emergency room should suicidal or homicidal ideations occur.    Impression/Formulation:    ICD-10-CM ICD-9-CM   1. Methamphetamine use disorder, severe, in early remission ([facility])  F15.21 304.43   2. Opioid use disorder, severe, in early remission, dependence ([facility])  F11.21 304.03       Clinical Maneuvering/Interventions: Therapist utilized a person-centered approach to build rapport with group member. Therapist implemented motivational  interviewing techniques to assist client with exploring and resolving ambivalence associated with commitment to change behaviors related to substance use and addiction. Therapist applied cognitive behavioral strategies to facilitate identification of maladaptive patterns of thinking and behavior that contribute to client's risk for continued substance use and relapse. Therapist employed group interaction activities to build rapport among group members, promote sobriety, and emphasize relapse prevention. Therapist promoted safe nonjudgmental environment by providing group members with unconditional positive regard and encouraging group members to comply with group rules and guidelines. Therapist assisted group member with identifying and implementing healthier coping strategies.      Plan:  Continue [facility] IOP Phase III  Aftercare:  [facility] Phase III  Program Assignments:  Personal recovery plan, relapse prevention plan, attendance of recovery support group meetings, exploration of sponsorship, drug/alcohol screens.     Amor Tabares LCSW  11/2/2022  16:49 EDT

## 2022-11-06 NOTE — PROGRESS NOTES
CD IOP GROUP     Date: 10/27/2022  Name: Ada Vogt    Time In: 1530   Time Out: 1627    This provider is located at [facility] located at [address] [place] [address].  The Patient is seen remotely at his/her home, using Zoom. Patient is being seen via telehealth and confirm that they are in a secure environment for this session. The patient's condition being diagnosed/treated is appropriate for telemedicine. The provider identified himself as well as his credentials. The patient gave consent to be seen remotely, and when consent is given they understand that the consent allows for patient identifiable information to be sent to a third party as needed. They may refuse to be seen remotely at any time. The electronic data is encrypted and password protected, and the patient has been advised of the potential risks to privacy not withstanding such measures.      Number of participants: 10    IOP GROUP NOTE     Data: 1 hour IOP group therapy session (Check-ins, Coping Skills, Relapse Prevention)     Check Ins: Therapist continued facilitation of rapport building strategies between group members. Therapist asked that each patient check in with home life and recovery efforts and identify triggers, cravings, and high risk situations that arise between group sessions. Therapist provided empathy and support during group session.     Session Content/Coping Skills: Check ins completed by group members. Clinician introduced Personal Recovery plan and reviewed each section of plan with group members. Group members were assigned this plan as homework that they will turn in.     Response: Patient attended class via Zoom. Patient participated in verbal completion of check in form. Patient during check in denied suicidal or homicidal thoughts. Patient participated in group discussions.      Personal Assessment 0-10 Scale (0-none, 10-high)    Anxiety:  1   Depression:  0   Cravings: 0     Assessment:      ..  Lab on 10/27/2022   Component Date Value Ref Range Status   • Ethanol, Urine 10/27/2022 Negative  Cutoff=0.020 % Final   • THC, Screen, Urine 10/27/2022 Negative  Negative Final   • Phencyclidine (PCP), Urine 10/27/2022 Negative  Negative Final   • Cocaine Screen, Urine 10/27/2022 Negative  Negative Final   • Methamphetamine, Ur 10/27/2022 Negative  Negative Final   • Opiate Screen 10/27/2022 Negative  Negative Final   • Amphetamine Screen, Urine 10/27/2022 Negative  Negative Final   • Benzodiazepine Screen, Urine 10/27/2022 Negative  Negative Final   • Tricyclic Antidepressants Screen 10/27/2022 Negative  Negative Final   • Methadone Screen, Urine 10/27/2022 Positive (A)  Negative Final   • Barbiturates Screen, Urine 10/27/2022 Negative  Negative Final   • Oxycodone Screen, Urine 10/27/2022 Negative  Negative Final   • Propoxyphene Screen 10/27/2022 Negative  Negative Final   • Buprenorphine, Screen, Urine 10/27/2022 Negative  Negative Final   • Methadone 10/27/2022 +POSITIVE+   Final   • Methadone, Quantitative 10/27/2022 2797  ng/mg creat Final   • EDDP 10/27/2022 9884  ng/mg creat Final   • Level of Detection: 10/27/2022 Comment   Final    Testing Threshold:  50 ng/mL                                                                       '  This test was developed and its performance characteristics  determined by [facility].  It has not been cleared or approved  by the Food and Drug Administration.   Lab on 10/20/2022   Component Date Value Ref Range Status   • Ethanol, Urine 10/20/2022 Negative  Cutoff=0.020 % Final   • THC, Screen, Urine 10/20/2022 Negative  Negative Final   • Phencyclidine (PCP), Urine 10/20/2022 Negative  Negative Final   • Cocaine Screen, Urine 10/20/2022 Negative  Negative Final   • Methamphetamine, Ur 10/20/2022 Negative  Negative Final   • Opiate Screen 10/20/2022 Negative  Negative Final   • Amphetamine Screen, Urine 10/20/2022 Negative  Negative Final   • Benzodiazepine Screen,  Urine 10/20/2022 Negative  Negative Final   • Tricyclic Antidepressants Screen 10/20/2022 Negative  Negative Final   • Methadone Screen, Urine 10/20/2022 Positive (A)  Negative Final   • Barbiturates Screen, Urine 10/20/2022 Negative  Negative Final   • Oxycodone Screen, Urine 10/20/2022 Negative  Negative Final   • Propoxyphene Screen 10/20/2022 Negative  Negative Final   • Buprenorphine, Screen, Urine 10/20/2022 Negative  Negative Final   • Methadone 10/20/2022 +POSITIVE+   Final   • Methadone, Quantitative 10/20/2022 >9615  ng/mg creat Final   • EDDP 10/20/2022 >9615  ng/mg creat Final   • Level of Detection: 10/20/2022 Comment   Final    Testing Threshold:  50 ng/mL                                                                       '  This test was developed and its performance characteristics  determined by [facility].  It has not been cleared or approved  by the Food and Drug Administration.   Lab on 10/14/2022   Component Date Value Ref Range Status   • Ethanol, Urine 10/14/2022 Negative  Cutoff=0.020 % Final   • THC, Screen, Urine 10/14/2022 Negative  Negative Final   • Phencyclidine (PCP), Urine 10/14/2022 Negative  Negative Final   • Cocaine Screen, Urine 10/14/2022 Negative  Negative Final   • Methamphetamine, Ur 10/14/2022 Negative  Negative Final   • Opiate Screen 10/14/2022 Negative  Negative Final   • Amphetamine Screen, Urine 10/14/2022 Negative  Negative Final   • Benzodiazepine Screen, Urine 10/14/2022 Negative  Negative Final   • Tricyclic Antidepressants Screen 10/14/2022 Negative  Negative Final   • Methadone Screen, Urine 10/14/2022 Positive (A)  Negative Final   • Barbiturates Screen, Urine 10/14/2022 Negative  Negative Final   • Oxycodone Screen, Urine 10/14/2022 Negative  Negative Final   • Propoxyphene Screen 10/14/2022 Negative  Negative Final   • Buprenorphine, Screen, Urine 10/14/2022 Negative  Negative Final   • Methadone 10/14/2022 +POSITIVE+   Final   • Methadone, Quantitative  10/14/2022 7180  ng/mg creat Final   • EDDP 10/14/2022 >7634  ng/mg creat Final   • Level of Detection: 10/14/2022 Comment   Final    Testing Threshold:  50 ng/mL                                                                       '  This test was developed and its performance characteristics  determined by [facility].  It has not been cleared or approved  by the Food and Drug Administration.       Mental Status Exam  Hygiene:  good  Dress: casual  Attitude: cooperative and agreeable   Motor Activity: appropriate  Eye Contact:  good  Speech: regular rate and rhythm   Mood:  calm and cooperative  Affect:  Appropriate  Thought Processes:  Linear  Thought Content:  Normal  Suicidal Thoughts:  denies  Homicidal Thoughts:  denies  Crisis Safety Plan: Safety plan has been discussed.   Hallucinations:  Unknown to clinician.   Reliability: fair  Insight: fair  Judgement: fair  Impulse Control: fair    Recovery/spiritual support group attendance: Patient reported she attended two NA meetings on the in the rooms [place].      Progress toward goal: Not at goal    Prognosis: Fair with Ongoing Treatment     Self-reported number of days sober: Patient reported 216 days during check in.     Patient will contact this office, call 911 or present to the nearest emergency room should suicidal or homicidal ideations occur.    Impression/Formulation:    ICD-10-CM ICD-9-CM   1. Methamphetamine use disorder, severe, in early remission ([facility])  F15.21 304.43   2. Opioid use disorder, severe, in early remission, dependence ([facility])  F11.21 304.03       Clinical Maneuvering/Interventions: Therapist utilized a person-centered approach to build rapport with group member. Therapist implemented motivational interviewing techniques to assist client with exploring and resolving ambivalence associated with commitment to change behaviors related to substance use and addiction. Therapist applied cognitive behavioral strategies to facilitate identification of  maladaptive patterns of thinking and behavior that contribute to client's risk for continued substance use and relapse. Therapist employed group interaction activities to build rapport among group members, promote sobriety, and emphasize relapse prevention. Therapist promoted safe nonjudgmental environment by providing group members with unconditional positive regard and encouraging group members to comply with group rules and guidelines. Therapist assisted group member with identifying and implementing healthier coping strategies.      Plan:  Continue [facility] IOP Phase III   Aftercare:  [facility] Phase III  Program Assignments:  Personal recovery plan, relapse prevention plan, attendance of recovery support group meetings, exploration of sponsorship, drug/alcohol screens.     Amor Tabares LCSW  11/6/2022  12:02 EST

## 2022-11-16 ENCOUNTER — DOCUMENTATION (OUTPATIENT)
Dept: PSYCHIATRY | Facility: CLINIC | Age: 37
End: 2022-11-16

## 2022-11-16 NOTE — PROGRESS NOTES
[facility] [address], [address]  [phone]    CHEMICAL DEPENDENCY   INTENSIVE OUTPATIENT PROGRAM    PHASE III  DISCHARGE SUMMARY        ATTENDING: YULI Lea  THERAPIST: Amor Tabares LCSW    Patient completed her 56th class of CD-IOP on 10/27/2022. Patient has successfully completed CD-IOP program.       Current Outpatient Medications:   •  albuterol sulfate  (90 Base) MCG/ACT inhaler, Inhale 2 puffs Every 4 (Four) Hours As Needed for Wheezing., Disp: 6.7 g, Rfl: 0  •  buPROPion (WELLBUTRIN) 100 MG tablet, Take 1 tablet by mouth 2 (Two) Times a Day., Disp: 30 tablet, Rfl: 2  •  docusate sodium (Colace) 100 MG capsule, Take 1 capsule by mouth 2 (Two) Times a Day As Needed for Constipation., Disp: 60 capsule, Rfl: 2  •  doxycycline (MONODOX) 100 MG capsule, Take 1 capsule by mouth 2 (Two) Times a Day., Disp: 20 capsule, Rfl: 0  •  ibuprofen (ADVIL,MOTRIN) 800 MG tablet, Take 1 tablet by mouth Every 8 (Eight) Hours As Needed for Mild Pain ., Disp: 60 tablet, Rfl: 0  •  methadone (DOLOPHINE) 10 MG tablet, Take 50 mg by mouth Daily,, Disp: , Rfl:   •  Nexplanon 68 MG implant subdermal implant, , Disp: , Rfl:      PROGNOSIS: good with continued care.    -Amor Tabares LCSW  11/16/2022  12:04 PM.

## 2022-12-14 ENCOUNTER — TELEMEDICINE (OUTPATIENT)
Dept: PSYCHIATRY | Facility: CLINIC | Age: 37
End: 2022-12-14

## 2022-12-14 DIAGNOSIS — F11.21 OPIOID USE DISORDER, SEVERE, IN EARLY REMISSION, DEPENDENCE: ICD-10-CM

## 2022-12-14 DIAGNOSIS — F33.1 MODERATE EPISODE OF RECURRENT MAJOR DEPRESSIVE DISORDER: ICD-10-CM

## 2022-12-14 DIAGNOSIS — F15.21 METHAMPHETAMINE USE DISORDER, SEVERE, IN EARLY REMISSION: ICD-10-CM

## 2022-12-14 DIAGNOSIS — F41.1 GENERALIZED ANXIETY DISORDER: Primary | ICD-10-CM

## 2022-12-14 DIAGNOSIS — K59.03 CONSTIPATION DUE TO OPIOID THERAPY: ICD-10-CM

## 2022-12-14 DIAGNOSIS — T40.2X5A CONSTIPATION DUE TO OPIOID THERAPY: ICD-10-CM

## 2022-12-14 PROCEDURE — 99214 OFFICE O/P EST MOD 30 MIN: CPT | Performed by: NURSE PRACTITIONER

## 2022-12-14 RX ORDER — POLYETHYLENE GLYCOL 3350 17 G/17G
17 POWDER, FOR SOLUTION ORAL DAILY
Qty: 510 G | Refills: 2 | Status: SHIPPED | OUTPATIENT
Start: 2022-12-14

## 2022-12-14 RX ORDER — DOCUSATE SODIUM 100 MG/1
100 CAPSULE, LIQUID FILLED ORAL 2 TIMES DAILY PRN
Qty: 60 CAPSULE | Refills: 2 | Status: SHIPPED | OUTPATIENT
Start: 2022-12-14

## 2022-12-14 RX ORDER — BUPROPION HYDROCHLORIDE 150 MG/1
150 TABLET ORAL EVERY MORNING
Qty: 30 TABLET | Refills: 0 | Status: SHIPPED | OUTPATIENT
Start: 2022-12-14 | End: 2023-01-11 | Stop reason: SDUPTHER

## 2022-12-14 NOTE — PROGRESS NOTES
Telehealth Visit      This provider is located at [facility], [address] [facility] in [place], [place],using a secure [employer] Video Visit through [employer]. Patient is being seen remotely via telehealth at their home address in [place], and stated they are in a secure environment for this session. The patient's condition being diagnosed/treated is appropriate for telemedicine. The provider identified herself as well as her credentials. The patient, and/or patients guardian, consent to be seen remotely, and when consent is given they understand that the consent allows for patient identifiable information to be sent to a third party as needed. They may refuse to be seen remotely at any time. The electronic data is encrypted and password protected, and the patient and/or guardian has been advised of the potential risks to privacy not withstanding such measures.    Patient Name: Ada Vogt  : 1985   MRN: [id number]     Referring Provider: Provider, No Known    Chief Complaint: Follow up on substance use    History of Present Illness:   Ada Vogt is a 37 y.o. female who is here today for follow up related to CHARLY and mood. On methadone 70mg daily through [facility] for OUD. Dose increased due to increase in cravings. They have subsided some but she is having fatigue and worsening constipation since. Admits inadequate fluid and fiber intake as well. Feels cravings somewhat triggered by increase in depression and anxiety symptoms. Stopped taking Wellbutrin as she was having trouble remembering to take both doses. No medication in about 4-6 weeks. PHQ9=11 and GAD7=14 today. Feels she struggles more this time of year anyway and would like pharmacological support. Using coping mechanisms to help with stress and cravings, in weekly therapy at [facility], and has CHARLY counseling at methadone clinic. Graduated from [employer] and is now celebrating 9 months sober. [employer] case plan  is closed. Working full time and recently moved out of sober living into her own apartment and has full time custody of her 2 year old daughter. She is very proud and has been congratulated on all her accomplishments but also finds it can be overwhelming at times. Denies SI/HI, AVH.    Triggers: stress, anxiety, financial issues    Cravings: weekly, mild for opioids. Denies meth cravings.    Relapse Prevention: Peer support, individual therapy, MOUD    Past Surgical History:  Past Surgical History:   Procedure Laterality Date   •  SECTION         Problem List:  There is no problem list on file for this patient.      Allergy:   Allergies   Allergen Reactions   • Amoxicillin Hives   • Penicillins Hives        Current Medications:   Current Outpatient Medications   Medication Sig Dispense Refill   • docusate sodium (Colace) 100 MG capsule Take 1 capsule by mouth 2 (Two) Times a Day As Needed for Constipation. 60 capsule 2   • methadone (DOLOPHINE) 10 MG tablet Take 70 mg by mouth Daily,     • Nexplanon 68 MG implant subdermal implant      • buPROPion XL (Wellbutrin XL) 150 MG 24 hr tablet Take 1 tablet by mouth Every Morning for 30 days. 30 tablet 0   • polyethylene glycol (MIRALAX) 17 GM/SCOOP powder Take 17 g by mouth Daily. 510 g 2     No current facility-administered medications for this visit.       Past Medical History:  Past Medical History:   Diagnosis Date   • Anxiety    • Depression    • Panic disorder        Social History:  Social History     Socioeconomic History   • Marital status: Single   Tobacco Use   • Smoking status: Every Day     Packs/day: 1.00     Types: Cigarettes   • Smokeless tobacco: Never   Vaping Use   • Vaping Use: Some days   • Substances: Nicotine, Flavoring   • Devices: Disposable   Substance and Sexual Activity   • Alcohol use: Not Currently   • Drug use: Not Currently     Types: Methamphetamines, Codeine     Comment: States she has not used in 3 weeks   • Sexual activity:  Defer       Family History:  Family History   Problem Relation Age of Onset   • Alcohol abuse Father    • Drug abuse Brother    • Alcohol abuse Brother          Subjective      Review of Systems:   Review of Systems   Constitutional: Positive for fatigue. Negative for chills and fever.   Respiratory: Negative for shortness of breath.    Cardiovascular: Negative for chest pain.   Gastrointestinal: Negative for abdominal pain.   Skin: Negative for skin lesions.   Neurological: Negative for seizures and confusion.   Psychiatric/Behavioral: Positive for sleep disturbance, depressed mood and stress. Negative for hallucinations and suicidal ideas. The patient is nervous/anxious.        PHQ-9 Total Score: 11    KATHY-7 Score:   Feeling nervous, anxious or on edge: Several days  Not being able to stop or control worrying: Nearly every day  Worrying too much about different things: Nearly every day  Trouble Relaxing: More than half the days  Being so restless that it is hard to sit still: Not at all  Feeling afraid as if something awful might happen: More than half the days  Becoming easily annoyed or irritable: Nearly every day  KATHY 7 Total Score: 14  If you checked any problems, how difficult have these problems made it for you to do your work, take care of things at home, or get along with other people: Somewhat difficult    Patient History:   The following portions of the patient's history were reviewed and updated as appropriate: allergies, current medications, past family history, past medical history, past social history, past surgical history and problem list.     Social:  Social History     Socioeconomic History   • Marital status: Single   Tobacco Use   • Smoking status: Every Day     Packs/day: 1.00     Types: Cigarettes   • Smokeless tobacco: Never   Vaping Use   • Vaping Use: Some days   • Substances: Nicotine, Flavoring   • Devices: Disposable   Substance and Sexual Activity   • Alcohol use: Not Currently   •  Drug use: Not Currently     Types: Methamphetamines, Codeine     Comment: States she has not used in 3 weeks   • Sexual activity: Defer       Medications:     Current Outpatient Medications:   •  docusate sodium (Colace) 100 MG capsule, Take 1 capsule by mouth 2 (Two) Times a Day As Needed for Constipation., Disp: 60 capsule, Rfl: 2  •  methadone (DOLOPHINE) 10 MG tablet, Take 70 mg by mouth Daily,, Disp: , Rfl:   •  Nexplanon 68 MG implant subdermal implant, , Disp: , Rfl:   •  buPROPion XL (Wellbutrin XL) 150 MG 24 hr tablet, Take 1 tablet by mouth Every Morning for 30 days., Disp: 30 tablet, Rfl: 0  •  polyethylene glycol (MIRALAX) 17 GM/SCOOP powder, Take 17 g by mouth Daily., Disp: 510 g, Rfl: 2    Objective     Physical Exam:  Physical Exam  Vitals reviewed.   Constitutional:       General: She is not in acute distress.     Appearance: She is well-developed. She is not ill-appearing.   Eyes:      General: No scleral icterus.  Pulmonary:      Effort: No respiratory distress.   Neurological:      Mental Status: She is alert and oriented to person, place, and time.   Psychiatric:         Speech: Speech normal.         Behavior: Behavior normal.         Thought Content: Thought content normal. Thought content does not include homicidal or suicidal ideation. Thought content does not include homicidal or suicidal plan.         Vital Signs: There were no vitals filed for this visit.  There is no height or weight on file to calculate BMI.     Mental Status Exam:   Hygiene:   good  Cooperation:  Cooperative  Eye Contact:  Good  Psychomotor Behavior:  Appropriate  Affect:  Full range  Mood: normal  Speech:  Normal  Thought Process:  Goal directed  Thought Content:  Normal  Suicidal:  None  Homicidal:  None  Hallucinations:  None  Delusion:  None  Memory:  Intact  Orientation:  Person, Place, Time and Situation  Reliability:  good  Insight:  Good  Judgement:  Good  Impulse Control:  Good    Assessment / Plan       Assessment & Plan   -Ada was previously on Wellbutrin 100mg twice a day and did well when she was taking it as prescribed. Discussed options  And she would like to start XL formulation. Discussed AE of medication and when to call/RTC  -Will continue therapy and CHARLY counseling as scheduled. Encouraged her to continue to utilize coping mechanisms.  -Reach out to peer support PRN  -Start Miralax 17g daily. Adequate fluid and fiber intake encouraged. May utilize Colace PRN until soft, daily BM.    Visit Diagnoses     Generalized anxiety disorder    -  Primary    Relevant Medications    buPROPion XL (Wellbutrin XL) 150 MG 24 hr tablet    Constipation due to opioid therapy        Relevant Medications    docusate sodium (Colace) 100 MG capsule    polyethylene glycol (MIRALAX) 17 GM/SCOOP powder    Opioid use disorder, severe, in early remission, dependence (HCC)        Methamphetamine use disorder, severe, in early remission (HCC)        Moderate episode of recurrent major depressive disorder (HCC)        Relevant Medications    buPROPion XL (Wellbutrin XL) 150 MG 24 hr tablet      Diagnoses       Codes Comments    Generalized anxiety disorder    -  Primary ICD-10-CM: F41.1  ICD-9-CM: 300.02     Constipation due to opioid therapy     ICD-10-CM: K59.03, T40.2X5A  ICD-9-CM: 564.09, E935.2     Opioid use disorder, severe, in early remission, dependence (HCC)     ICD-10-CM: F11.21  ICD-9-CM: 304.03     Methamphetamine use disorder, severe, in early remission (HCC)     ICD-10-CM: F15.21  ICD-9-CM: 304.43     Moderate episode of recurrent major depressive disorder (HCC)     ICD-10-CM: F33.1  ICD-9-CM: 296.32           Visit Diagnoses:    ICD-10-CM ICD-9-CM   1. Generalized anxiety disorder  F41.1 300.02   2. Constipation due to opioid therapy  K59.03 564.09    T40.2X5A E935.2   3. Opioid use disorder, severe, in early remission, dependence (HCC)  F11.21 304.03   4. Methamphetamine use disorder, severe, in early remission  ([facility])  F15.21 304.43   5. Moderate episode of recurrent major depressive disorder ([facility])  F33.1 296.32       PLAN:  1. Safety: No acute safety concerns  2. Risk Assessment: Risk of self-harm acutely is low. Risk of self-harm chronically is also low, but could be further elevated in the event of treatment noncompliance and/or AODA.      TREATMENT PLAN: Continue supportive psychotherapy efforts and medications as indicated. Treatment and medication options discussed during today's visit. Patient acknowledged and verbally consented to continue with current treatment plan and was educated on the importance of compliance with treatment and follow-up appointments.    GOALS:  Short Term Goals: Patient will be compliant with medication, and patient will have no significant medication related side effects.  Patient will be engaged in psychotherapy as indicated.  Patient will report subjective improvement of symptoms.  Long term goals: To stabilize mood and treat/improve subjective symptoms, the patient will stay out of the hospital, the patient will be at an optimal level of functioning, and the patient will take all medications as prescribed.  The patient/guardian verbalized understanding and agreement with goals that were mutually set.      MEDICATION ISSUES:  RITO reviewed as expected.  Discussed medication options and treatment plan of prescribed medication as well as the risks, benefits, and side effects including potential falls, possible impaired driving and metabolic adversities among others. Patient is agreeable to call the office with any worsening of symptoms or onset of side effects. Patient is agreeable to call 911 or go to the nearest ER should he/she begin having SI/HI. No medication side effects or related complaints today.     MEDS ORDERED DURING VISIT:  New Medications Ordered This Visit   Medications   • docusate sodium (Colace) 100 MG capsule     Sig: Take 1 capsule by mouth 2 (Two) Times a Day As Needed  for Constipation.     Dispense:  60 capsule     Refill:  2   • buPROPion XL (Wellbutrin XL) 150 MG 24 hr tablet     Sig: Take 1 tablet by mouth Every Morning for 30 days.     Dispense:  30 tablet     Refill:  0   • polyethylene glycol (MIRALAX) 17 GM/SCOOP powder     Sig: Take 17 g by mouth Daily.     Dispense:  510 g     Refill:  2       Return in about 4 weeks (around 1/11/2023) for Follow Up Medication, Telehealth Visit.           This document has been electronically signed by YULI Lea  December 15, 2022 09:02 EST      Part of this note may be an electronic transcription/translation of spoken language to printed text using the Dragon Dictation System.

## 2023-01-11 ENCOUNTER — TELEMEDICINE (OUTPATIENT)
Dept: PSYCHIATRY | Facility: CLINIC | Age: 38
End: 2023-01-11
Payer: COMMERCIAL

## 2023-01-11 DIAGNOSIS — F41.1 GENERALIZED ANXIETY DISORDER: ICD-10-CM

## 2023-01-11 DIAGNOSIS — F33.1 MODERATE EPISODE OF RECURRENT MAJOR DEPRESSIVE DISORDER: Primary | ICD-10-CM

## 2023-01-11 DIAGNOSIS — F11.21 OPIOID USE DISORDER, SEVERE, IN EARLY REMISSION, DEPENDENCE: ICD-10-CM

## 2023-01-11 PROCEDURE — 99214 OFFICE O/P EST MOD 30 MIN: CPT | Performed by: NURSE PRACTITIONER

## 2023-01-11 RX ORDER — HYDROXYZINE PAMOATE 25 MG/1
25 CAPSULE ORAL 3 TIMES DAILY PRN
Qty: 90 CAPSULE | Refills: 0 | Status: SHIPPED | OUTPATIENT
Start: 2023-01-11

## 2023-01-11 RX ORDER — BUPROPION HYDROCHLORIDE 150 MG/1
150 TABLET ORAL EVERY MORNING
Qty: 30 TABLET | Refills: 0 | Status: SHIPPED | OUTPATIENT
Start: 2023-01-11 | End: 2023-02-10

## 2023-01-11 RX ORDER — NALOXONE HYDROCHLORIDE 4 MG/.1ML
1 SPRAY NASAL AS NEEDED
Qty: 2 EACH | Refills: 2 | Status: SHIPPED | OUTPATIENT
Start: 2023-01-11

## 2023-01-11 NOTE — PROGRESS NOTES
Telehealth Visit      This provider is located at [facility], [facility], [address] in [place], [place],using a secure [employer] Video Visit through [employer]. Patient is being seen remotely via telehealth at their home address in [place], and stated they are in a secure environment for this session. The patient's condition being diagnosed/treated is appropriate for telemedicine. The provider identified herself as well as her credentials. The patient, and/or patients guardian, consent to be seen remotely, and when consent is given they understand that the consent allows for patient identifiable information to be sent to a third party as needed. They may refuse to be seen remotely at any time. The electronic data is encrypted and password protected, and the patient and/or guardian has been advised of the potential risks to privacy not withstanding such measures.    Patient Name: Ada Vogt  : 1985   MRN: [id number]     Referring Provider: Provider, No Known    Chief Complaint: Follow up on substance use    History of Present Illness:   Ada Vogt is a 37 y.o. female who is here today for follow up related to CHARLY and mood. Continues methadone 70mg daily through [facility] for OUD. Reports today she is having withdrawal symptoms at [facility] and will likely have to go up on methadone dose again. Is having some cravings as well that are worse at night. Feels depressive symptoms are much improved since starting on Wellbutrin 150mgXL daily. Continues with some anxiety and has had about 2 panic attacks since last follow up. These occurred during a stressful situation at work. Continues in counseling weekly at [facility] and at [facility] and both are helpful for her. Also uses coping techniques learned in IOP. Sleeping well at night.Denies SI/HI, AVH.    Triggers: stress, anxiety, financial issues    Cravings: most night, mild for opioids. Denies meth cravings.    Relapse  Prevention: Peer support, individual therapy, MOUD    Past Surgical History:  Past Surgical History:   Procedure Laterality Date   •  SECTION         Problem List:  There is no problem list on file for this patient.      Allergy:   Allergies   Allergen Reactions   • Amoxicillin Hives   • Penicillins Hives        Current Medications:   Current Outpatient Medications   Medication Sig Dispense Refill   • buPROPion XL (Wellbutrin XL) 150 MG 24 hr tablet Take 1 tablet by mouth Every Morning for 30 days. 30 tablet 0   • docusate sodium (Colace) 100 MG capsule Take 1 capsule by mouth 2 (Two) Times a Day As Needed for Constipation. 60 capsule 2   • methadone (DOLOPHINE) 10 MG tablet Take 70 mg by mouth Daily,     • Nexplanon 68 MG implant subdermal implant      • polyethylene glycol (MIRALAX) 17 GM/SCOOP powder Take 17 g by mouth Daily. 510 g 2   • hydrOXYzine pamoate (Vistaril) 25 MG capsule Take 1 capsule by mouth 3 (Three) Times a Day As Needed for Anxiety. 90 capsule 0   • naloxone (NARCAN) 4 MG/0.1ML nasal spray 1 spray into the nostril(s) as directed by provider As Needed (use for oversedation and call 911). 2 each 2     No current facility-administered medications for this visit.       Past Medical History:  Past Medical History:   Diagnosis Date   • Anxiety    • Depression    • Panic disorder        Social History:  Social History     Socioeconomic History   • Marital status: Single   Tobacco Use   • Smoking status: Every Day     Packs/day: 1.00     Types: Cigarettes   • Smokeless tobacco: Never   Vaping Use   • Vaping Use: Some days   • Substances: Nicotine, Flavoring   • Devices: Disposable   Substance and Sexual Activity   • Alcohol use: Not Currently   • Drug use: Not Currently     Types: Methamphetamines, Codeine     Comment: States she has not used in 3 weeks   • Sexual activity: Defer       Family History:  Family History   Problem Relation Age of Onset   • Alcohol abuse Father    • Drug abuse  Brother    • Alcohol abuse Brother          Subjective      Review of Systems:   Review of Systems   Constitutional: Positive for fatigue. Negative for chills and fever.   Respiratory: Negative for shortness of breath.    Cardiovascular: Negative for chest pain.   Gastrointestinal: Negative for abdominal pain.   Skin: Negative for skin lesions.   Neurological: Negative for seizures and confusion.   Psychiatric/Behavioral: Positive for stress. Negative for hallucinations, sleep disturbance, suicidal ideas and depressed mood. The patient is nervous/anxious.      PHQ-9 Score:   1    KATHY-7 Score:   Feeling nervous, anxious or on edge: Several days  Not being able to stop or control worrying: Several days  Worrying too much about different things: Several days  Trouble Relaxing: Not at all  Being so restless that it is hard to sit still: Not at all  Feeling afraid as if something awful might happen: Not at all  Becoming easily annoyed or irritable: Not at all  KATHY 7 Total Score: 3  If you checked any problems, how difficult have these problems made it for you to do your work, take care of things at home, or get along with other people: Somewhat difficult    Patient History:   The following portions of the patient's history were reviewed and updated as appropriate: allergies, current medications, past family history, past medical history, past social history, past surgical history and problem list.     Social:  Social History     Socioeconomic History   • Marital status: Single   Tobacco Use   • Smoking status: Every Day     Packs/day: 1.00     Types: Cigarettes   • Smokeless tobacco: Never   Vaping Use   • Vaping Use: Some days   • Substances: Nicotine, Flavoring   • Devices: Disposable   Substance and Sexual Activity   • Alcohol use: Not Currently   • Drug use: Not Currently     Types: Methamphetamines, Codeine     Comment: States she has not used in 3 weeks   • Sexual activity: Defer       Medications:     Current  Outpatient Medications:   •  buPROPion XL (Wellbutrin XL) 150 MG 24 hr tablet, Take 1 tablet by mouth Every Morning for 30 days., Disp: 30 tablet, Rfl: 0  •  docusate sodium (Colace) 100 MG capsule, Take 1 capsule by mouth 2 (Two) Times a Day As Needed for Constipation., Disp: 60 capsule, Rfl: 2  •  methadone (DOLOPHINE) 10 MG tablet, Take 70 mg by mouth Daily,, Disp: , Rfl:   •  Nexplanon 68 MG implant subdermal implant, , Disp: , Rfl:   •  polyethylene glycol (MIRALAX) 17 GM/SCOOP powder, Take 17 g by mouth Daily., Disp: 510 g, Rfl: 2  •  hydrOXYzine pamoate (Vistaril) 25 MG capsule, Take 1 capsule by mouth 3 (Three) Times a Day As Needed for Anxiety., Disp: 90 capsule, Rfl: 0  •  naloxone (NARCAN) 4 MG/0.1ML nasal spray, 1 spray into the nostril(s) as directed by provider As Needed (use for oversedation and call 911)., Disp: 2 each, Rfl: 2    Objective     Physical Exam:  Physical Exam    Vital Signs: There were no vitals filed for this visit.  There is no height or weight on file to calculate BMI.     Mental Status Exam:   Hygiene:   good  Cooperation:  Cooperative  Eye Contact:  Good  Psychomotor Behavior:  Appropriate  Affect:  Full range  Mood: normal  Speech:  Normal  Thought Process:  Goal directed  Thought Content:  Normal  Suicidal:  None  Homicidal:  None  Hallucinations:  None  Delusion:  None  Memory:  Intact  Orientation:  Person, Place, Time and Situation  Reliability:  good  Insight:  Good  Judgement:  Good  Impulse Control:  Good    Assessment / Plan      Assessment & Plan   -Isela continues to do well in her sobriety and is now over 9 months sober.  She is engaging in therapy and other recovery efforts since graduating our IOP program.  Does request Narcan as someone that lives near her recently overdosed.  -We will continue Wellbutrin 150 mg XL daily as it has been helpful for her depression.  Does not feel like Wellbutrin has worsened anxiety and KATHY-7 is improved.  -Discussed Vistaril versus  BuSpar for help with anxiety.  Would like to try Vistaril 25 mg 3 times daily as needed.  Does have some grogginess with methadone use and will call if Vistaril is too sedating and we can switch to BuSpar.  Has difficulty with twice daily dosing.  Typically does not need medication during the summer month so we are looking at options that are easier for her to wean off.  -Advisement to take part in and remain active in 12 Step Recovery Meetings, IOP, and/or 1:1 therapy/counseling and to establish/maintain an active relationship with a recovery sponsor.     Visit Diagnoses     Moderate episode of recurrent major depressive disorder (HCC)    -  Primary    Relevant Medications    buPROPion XL (Wellbutrin XL) 150 MG 24 hr tablet    hydrOXYzine pamoate (Vistaril) 25 MG capsule    Opioid use disorder, severe, in early remission, dependence (HCC)        Relevant Medications    naloxone (NARCAN) 4 MG/0.1ML nasal spray    Generalized anxiety disorder        Relevant Medications    buPROPion XL (Wellbutrin XL) 150 MG 24 hr tablet    hydrOXYzine pamoate (Vistaril) 25 MG capsule      Diagnoses       Codes Comments    Moderate episode of recurrent major depressive disorder (HCC)    -  Primary ICD-10-CM: F33.1  ICD-9-CM: 296.32     Opioid use disorder, severe, in early remission, dependence (HCC)     ICD-10-CM: F11.21  ICD-9-CM: 304.03     Generalized anxiety disorder     ICD-10-CM: F41.1  ICD-9-CM: 300.02           Visit Diagnoses:    ICD-10-CM ICD-9-CM   1. Moderate episode of recurrent major depressive disorder (HCC)  F33.1 296.32   2. Opioid use disorder, severe, in early remission, dependence (HCC)  F11.21 304.03   3. Generalized anxiety disorder  F41.1 300.02       PLAN:  1. Safety: No acute safety concerns  2. Risk Assessment: Risk of self-harm acutely is low. Risk of self-harm chronically is also low, but could be further elevated in the event of treatment noncompliance and/or AODA.      TREATMENT PLAN: Continue  supportive psychotherapy efforts and medications as indicated. Treatment and medication options discussed during today's visit. Patient acknowledged and verbally consented to continue with current treatment plan and was educated on the importance of compliance with treatment and follow-up appointments.    GOALS:  Short Term Goals: Patient will be compliant with medication, and patient will have no significant medication related side effects.  Patient will be engaged in psychotherapy as indicated.  Patient will report subjective improvement of symptoms.  Long term goals: To stabilize mood and treat/improve subjective symptoms, the patient will stay out of the hospital, the patient will be at an optimal level of functioning, and the patient will take all medications as prescribed.  The patient/guardian verbalized understanding and agreement with goals that were mutually set.      MEDICATION ISSUES:  RITO reviewed as expected.  Discussed medication options and treatment plan of prescribed medication as well as the risks, benefits, and side effects including potential falls, possible impaired driving and metabolic adversities among others. Patient is agreeable to call the office with any worsening of symptoms or onset of side effects. Patient is agreeable to call 911 or go to the nearest ER should he/she begin having SI/HI. No medication side effects or related complaints today.     MEDS ORDERED DURING VISIT:  New Medications Ordered This Visit   Medications   • buPROPion XL (Wellbutrin XL) 150 MG 24 hr tablet     Sig: Take 1 tablet by mouth Every Morning for 30 days.     Dispense:  30 tablet     Refill:  0   • hydrOXYzine pamoate (Vistaril) 25 MG capsule     Sig: Take 1 capsule by mouth 3 (Three) Times a Day As Needed for Anxiety.     Dispense:  90 capsule     Refill:  0   • naloxone (NARCAN) 4 MG/0.1ML nasal spray     Si spray into the nostril(s) as directed by provider As Needed (use for oversedation and call  911).     Dispense:  2 each     Refill:  2       Return in about 4 weeks (around 2/8/2023) for Telehealth Visit.           This document has been electronically signed by YULI Lea  January 11, 2023 13:28 EST      Part of this note may be an electronic transcription/translation of spoken language to printed text using the Dragon Dictation System.

## 2023-02-07 ENCOUNTER — TELEMEDICINE (OUTPATIENT)
Dept: PSYCHIATRY | Facility: CLINIC | Age: 38
End: 2023-02-07
Payer: COMMERCIAL

## 2023-02-07 DIAGNOSIS — F41.1 GENERALIZED ANXIETY DISORDER: Primary | ICD-10-CM

## 2023-02-07 DIAGNOSIS — J02.0 ACUTE STREPTOCOCCAL PHARYNGITIS: ICD-10-CM

## 2023-02-07 DIAGNOSIS — F33.1 MODERATE EPISODE OF RECURRENT MAJOR DEPRESSIVE DISORDER: ICD-10-CM

## 2023-02-07 PROCEDURE — 99214 OFFICE O/P EST MOD 30 MIN: CPT | Performed by: NURSE PRACTITIONER

## 2023-02-07 RX ORDER — CEPHALEXIN 500 MG/1
500 CAPSULE ORAL 2 TIMES DAILY
Qty: 20 CAPSULE | Refills: 0 | Status: SHIPPED | OUTPATIENT
Start: 2023-02-07 | End: 2023-02-17

## 2023-02-07 RX ORDER — ESCITALOPRAM OXALATE 5 MG/1
5 TABLET ORAL DAILY
Qty: 30 TABLET | Refills: 0 | Status: SHIPPED | OUTPATIENT
Start: 2023-02-07 | End: 2023-03-07 | Stop reason: SDUPTHER

## 2023-02-07 NOTE — PROGRESS NOTES
Telehealth Visit      This provider is located at [facility], [address] in [place], [place],using a secure adjusthart Video Visit through [employer]. Patient is being seen remotely via telehealth at their home address in [place], and stated they are in a secure environment for this session. The patient's condition being diagnosed/treated is appropriate for telemedicine. The provider identified herself as well as her credentials. The patient, and/or patients guardian, consent to be seen remotely, and when consent is given they understand that the consent allows for patient identifiable information to be sent to a third party as needed. They may refuse to be seen remotely at any time. The electronic data is encrypted and password protected, and the patient and/or guardian has been advised of the potential risks to privacy not withstanding such measures.    Patient Name: Ada Vogt  : 1985   MRN: [id number]     Referring Provider: Provider, No Known    Chief Complaint: Follow up on substance use    History of Present Illness:   Ada Vogt is a 38 y.o. female who is here today for follow up related to CHARLY and mood. Celebrates one year of recovery next month. Continues to engage in her recovery and attends therapy weekly at [facility] and sees a counselor monthly at her methadone clinic.  Continues on 70 mg of methadone daily.  Does have some breakthrough cravings and feels like she is in mild withdrawal before next dose is due.  She can go up on dosing but this will require her to drop back down to daily dosing instead of 3 times per week.  She is working full-time and taking care of her daughter full-time and does not feel like she can work this into her schedule at this time.  Does report that she feels stable in her sobriety.  Continues taking Wellbutrin 150 mg XL daily that has been very helpful for her depression.  Continues to report daily  anxiety symptoms.  Feels she over thinks things, gets overwhelmed easily, catastrophic thinker, and thoughts race a lot.  Would like to try a new medication for this as as needed hydroxyzine is too sedating and therapy has not been helpful. Denies SI/HI, AVH.  Patient also has acute complaint of sore throat over the last 3 days.  Has been progressively worsening and now finds it difficult to swallow.  Has tactile fever, headache, and lymph nodes are swollen.  Daughter tested positive for strep late last week.  She is concerned as she does not have time to go to [facility] for evaluation. Allergy to PCN is rash. No hx of anaphylaxis. Has taken Keflex in the past with no AE. No other complaints today    Triggers: stress, anxiety, financial issues, illness    Cravings: most night, mild for opioids. Denies meth cravings.    Relapse Prevention: Peer support, individual therapy, MOUD    Past Surgical History:  Past Surgical History:   Procedure Laterality Date   •  SECTION         Problem List:  There is no problem list on file for this patient.      Allergy:   Allergies   Allergen Reactions   • Amoxicillin Hives   • Penicillins Hives        Current Medications:   Current Outpatient Medications   Medication Sig Dispense Refill   • buPROPion XL (Wellbutrin XL) 150 MG 24 hr tablet Take 1 tablet by mouth Every Morning for 30 days. 30 tablet 0   • docusate sodium (Colace) 100 MG capsule Take 1 capsule by mouth 2 (Two) Times a Day As Needed for Constipation. 60 capsule 2   • hydrOXYzine pamoate (Vistaril) 25 MG capsule Take 1 capsule by mouth 3 (Three) Times a Day As Needed for Anxiety. 90 capsule 0   • methadone (DOLOPHINE) 10 MG tablet Take 70 mg by mouth Daily,     • naloxone (NARCAN) 4 MG/0.1ML nasal spray 1 spray into the nostril(s) as directed by provider As Needed (use for oversedation and call 911). 2 each 2   • Nexplanon 68 MG implant subdermal implant      • polyethylene glycol (MIRALAX) 17 GM/SCOOP powder Take  17 g by mouth Daily. 510 g 2   • cephalexin (KEFLEX) 500 MG capsule Take 1 capsule by mouth 2 (Two) Times a Day for 10 days. 20 capsule 0   • escitalopram (Lexapro) 5 MG tablet Take 1 tablet by mouth Daily. 30 tablet 0     No current facility-administered medications for this visit.       Past Medical History:  Past Medical History:   Diagnosis Date   • Anxiety    • Depression    • Panic disorder        Social History:  Social History     Socioeconomic History   • Marital status: Single   Tobacco Use   • Smoking status: Every Day     Packs/day: 1.00     Types: Cigarettes   • Smokeless tobacco: Never   Vaping Use   • Vaping Use: Some days   • Substances: Nicotine, Flavoring   • Devices: Disposable   Substance and Sexual Activity   • Alcohol use: Not Currently   • Drug use: Not Currently     Types: Methamphetamines, Codeine     Comment: States she has not used in 3 weeks   • Sexual activity: Defer       Family History:  Family History   Problem Relation Age of Onset   • Alcohol abuse Father    • Drug abuse Brother    • Alcohol abuse Brother          Subjective      Review of Systems:   Review of Systems   Constitutional: Positive for chills and fever. Negative for fatigue.   HENT: Positive for sore throat and swollen glands. Negative for congestion, ear pain and sinus pressure.    Respiratory: Negative for cough and shortness of breath.    Cardiovascular: Negative for chest pain.   Gastrointestinal: Negative for abdominal pain.   Skin: Negative for skin lesions.   Neurological: Negative for seizures and confusion.   Psychiatric/Behavioral: Positive for stress. Negative for hallucinations, sleep disturbance, suicidal ideas and depressed mood. The patient is nervous/anxious.        PHQ-9 Total Score: 1    KATHY-7 Score:   Feeling nervous, anxious or on edge: Several days  Not being able to stop or control worrying: Nearly every day  Worrying too much about different things: More than half the days  Trouble Relaxing:  Several days  Being so restless that it is hard to sit still: Not at all  Feeling afraid as if something awful might happen: More than half the days  Becoming easily annoyed or irritable: Several days  KATHY 7 Total Score: 10  If you checked any problems, how difficult have these problems made it for you to do your work, take care of things at home, or get along with other people: Somewhat difficult    Patient History:   The following portions of the patient's history were reviewed and updated as appropriate: allergies, current medications, past family history, past medical history, past social history, past surgical history and problem list.     Social:  Social History     Socioeconomic History   • Marital status: Single   Tobacco Use   • Smoking status: Every Day     Packs/day: 1.00     Types: Cigarettes   • Smokeless tobacco: Never   Vaping Use   • Vaping Use: Some days   • Substances: Nicotine, Flavoring   • Devices: Disposable   Substance and Sexual Activity   • Alcohol use: Not Currently   • Drug use: Not Currently     Types: Methamphetamines, Codeine     Comment: States she has not used in 3 weeks   • Sexual activity: Defer       Medications:     Current Outpatient Medications:   •  buPROPion XL (Wellbutrin XL) 150 MG 24 hr tablet, Take 1 tablet by mouth Every Morning for 30 days., Disp: 30 tablet, Rfl: 0  •  docusate sodium (Colace) 100 MG capsule, Take 1 capsule by mouth 2 (Two) Times a Day As Needed for Constipation., Disp: 60 capsule, Rfl: 2  •  hydrOXYzine pamoate (Vistaril) 25 MG capsule, Take 1 capsule by mouth 3 (Three) Times a Day As Needed for Anxiety., Disp: 90 capsule, Rfl: 0  •  methadone (DOLOPHINE) 10 MG tablet, Take 70 mg by mouth Daily,, Disp: , Rfl:   •  naloxone (NARCAN) 4 MG/0.1ML nasal spray, 1 spray into the nostril(s) as directed by provider As Needed (use for oversedation and call 911)., Disp: 2 each, Rfl: 2  •  Nexplanon 68 MG implant subdermal implant, , Disp: , Rfl:   •  polyethylene  glycol (MIRALAX) 17 GM/SCOOP powder, Take 17 g by mouth Daily., Disp: 510 g, Rfl: 2  •  cephalexin (KEFLEX) 500 MG capsule, Take 1 capsule by mouth 2 (Two) Times a Day for 10 days., Disp: 20 capsule, Rfl: 0  •  escitalopram (Lexapro) 5 MG tablet, Take 1 tablet by mouth Daily., Disp: 30 tablet, Rfl: 0    Objective     Physical Exam:  Physical Exam  Vitals reviewed.   Constitutional:       General: She is not in acute distress.     Appearance: She is well-developed. She is not ill-appearing.   Pulmonary:      Effort: No respiratory distress.   Neurological:      Mental Status: She is alert and oriented to person, place, and time.   Psychiatric:         Attention and Perception: Attention normal.         Mood and Affect: Mood and affect normal.         Speech: Speech normal.         Behavior: Behavior normal. Behavior is cooperative.         Thought Content: Thought content is not paranoid or delusional. Thought content does not include homicidal or suicidal ideation. Thought content does not include homicidal or suicidal plan.         Cognition and Memory: Cognition and memory normal.         Judgment: Judgment normal.         Vital Signs: There were no vitals filed for this visit.  There is no height or weight on file to calculate BMI.     Mental Status Exam: reviewed 2/7/2023  Hygiene:   good  Cooperation:  Cooperative  Eye Contact:  Good  Psychomotor Behavior:  Appropriate  Affect:  Full range  Mood: normal  Speech:  Normal  Thought Process:  Goal directed  Thought Content:  Normal  Suicidal:  None  Homicidal:  None  Hallucinations:  None  Delusion:  None  Memory:  Intact  Orientation:  Person, Place, Time and Situation  Reliability:  good  Insight:  Good  Judgement:  Good  Impulse Control:  Good    Assessment / Plan      Assessment & Plan   -Continue Wellbutrin 150 mg XL daily as it has been helpful for her depression.  Does not feel like Wellbutrin has worsened anxiety.  -Start escitalopram 5mg daily. Discussed  AE and when to RTC/Er. No cardiac hx.     -Start Keflex 500mg BID x10 days. Take medication until is it gone. Allergy to PCN but has tolerated Keflex in the past. Encouraged pro-biotic, new toothbrush. Follow up with PCP with worsening symptoms or failure to improve.  -Advisement to take part in and remain active in 12 Step Recovery Meetings, IOP, and/or 1:1 therapy/counseling and to establish/maintain an active relationship with a recovery sponsor.   Visit Diagnoses     Generalized anxiety disorder    -  Primary    Relevant Medications    escitalopram (Lexapro) 5 MG tablet    Moderate episode of recurrent major depressive disorder (HCC)        Relevant Medications    escitalopram (Lexapro) 5 MG tablet    Acute streptococcal pharyngitis        Relevant Medications    cephalexin (KEFLEX) 500 MG capsule      Diagnoses       Codes Comments    Generalized anxiety disorder    -  Primary ICD-10-CM: F41.1  ICD-9-CM: 300.02     Moderate episode of recurrent major depressive disorder (HCC)     ICD-10-CM: F33.1  ICD-9-CM: 296.32     Acute streptococcal pharyngitis     ICD-10-CM: J02.0  ICD-9-CM: 034.0           Visit Diagnoses:    ICD-10-CM ICD-9-CM   1. Generalized anxiety disorder  F41.1 300.02   2. Moderate episode of recurrent major depressive disorder (HCC)  F33.1 296.32   3. Acute streptococcal pharyngitis  J02.0 034.0       PLAN:  1. Safety: No acute safety concerns  2. Risk Assessment: Risk of self-harm acutely is low. Risk of self-harm chronically is also low, but could be further elevated in the event of treatment noncompliance and/or AODA.      TREATMENT PLAN: Continue supportive psychotherapy efforts and medications as indicated. Treatment and medication options discussed during today's visit. Patient acknowledged and verbally consented to continue with current treatment plan and was educated on the importance of compliance with treatment and follow-up appointments.    GOALS:  Short Term Goals: Patient will be  compliant with medication, and patient will have no significant medication related side effects.  Patient will be engaged in psychotherapy as indicated.  Patient will report subjective improvement of symptoms.  Long term goals: To stabilize mood and treat/improve subjective symptoms, the patient will stay out of the hospital, the patient will be at an optimal level of functioning, and the patient will take all medications as prescribed.  The patient/guardian verbalized understanding and agreement with goals that were mutually set.      MEDICATION ISSUES:  RITO reviewed as expected.  Discussed medication options and treatment plan of prescribed medication as well as the risks, benefits, and side effects including potential falls, possible impaired driving and metabolic adversities among others. Patient is agreeable to call the office with any worsening of symptoms or onset of side effects. Patient is agreeable to call 911 or go to the nearest ER should he/she begin having SI/HI. No medication side effects or related complaints today.     MEDS ORDERED DURING VISIT:  New Medications Ordered This Visit   Medications   • escitalopram (Lexapro) 5 MG tablet     Sig: Take 1 tablet by mouth Daily.     Dispense:  30 tablet     Refill:  0   • cephalexin (KEFLEX) 500 MG capsule     Sig: Take 1 capsule by mouth 2 (Two) Times a Day for 10 days.     Dispense:  20 capsule     Refill:  0       No follow-ups on file.           This document has been electronically signed by YULI Lea  February 7, 2023 16:29 EST      Part of this note may be an electronic transcription/translation of spoken language to printed text using the Dragon Dictation System.

## 2023-02-10 DIAGNOSIS — F33.1 MODERATE EPISODE OF RECURRENT MAJOR DEPRESSIVE DISORDER: ICD-10-CM

## 2023-02-10 RX ORDER — BUPROPION HYDROCHLORIDE 150 MG/1
TABLET ORAL
Qty: 30 TABLET | Refills: 2 | Status: SHIPPED | OUTPATIENT
Start: 2023-02-10 | End: 2023-03-07 | Stop reason: SDUPTHER

## 2023-02-10 NOTE — TELEPHONE ENCOUNTER
Rx Refill Note  Requested Prescriptions     Pending Prescriptions Disp Refills   • buPROPion XL (WELLBUTRIN XL) 150 MG 24 hr tablet [Pharmacy Med Name: BUPROPION XL 150MG TABLETS (24 H)] 30 tablet 0     Sig: TAKE 1 TABLET BY MOUTH EVERY MORNING      Last office visit with prescribing clinician: 8/10/2022      Next office visit with prescribing clinician: 3/7/2023            Sarina Macllister MA  02/10/23, 10:14 EST

## 2023-03-07 ENCOUNTER — TELEMEDICINE (OUTPATIENT)
Dept: PSYCHIATRY | Facility: CLINIC | Age: 38
End: 2023-03-07
Payer: COMMERCIAL

## 2023-03-07 DIAGNOSIS — F41.1 GENERALIZED ANXIETY DISORDER: ICD-10-CM

## 2023-03-07 DIAGNOSIS — F33.1 MODERATE EPISODE OF RECURRENT MAJOR DEPRESSIVE DISORDER: ICD-10-CM

## 2023-03-07 PROCEDURE — 99214 OFFICE O/P EST MOD 30 MIN: CPT | Performed by: NURSE PRACTITIONER

## 2023-03-07 RX ORDER — ESCITALOPRAM OXALATE 5 MG/1
5 TABLET ORAL DAILY
Qty: 30 TABLET | Refills: 2 | Status: SHIPPED | OUTPATIENT
Start: 2023-03-07

## 2023-03-07 RX ORDER — BUPROPION HYDROCHLORIDE 150 MG/1
150 TABLET ORAL EVERY MORNING
Qty: 30 TABLET | Refills: 2 | Status: SHIPPED | OUTPATIENT
Start: 2023-03-07

## 2023-03-07 NOTE — PROGRESS NOTES
Telehealth Visit      This provider is located at [facility], Behavioral Health, [address] [facility] in [place], [place],using a secure [employer] Video Visit through [employer]. Patient is being seen remotely via telehealth at their home address in [place], and stated they are in a secure environment for this session. The patient's condition being diagnosed/treated is appropriate for telemedicine. The provider identified herself as well as her credentials. The patient, and/or patients guardian, consent to be seen remotely, and when consent is given they understand that the consent allows for patient identifiable information to be sent to a third party as needed. They may refuse to be seen remotely at any time. The electronic data is encrypted and password protected, and the patient and/or guardian has been advised of the potential risks to privacy not withstanding such measures.    Patient Name: Ada Vogt  : 1985   MRN: [id number]     Referring Provider: Provider, No Known    Chief Complaint: Follow up on substance use    History of Present Illness:   Ada Vogt is a 38 y.o. female who is here today for follow up related to CHARLY and mood. Continues to engage in her recovery and attends therapy weekly at [facility] and sees a counselor monthly at her methadone clinic. Now doing TCM at methadone clinic. Continues on 70 mg of methadone daily ([facility]). Continues to have some breakthrough cravings and feels like she is in mild withdrawal before next dose is due and plans to go up on dose soon.    Continues on Wellbutrin 150 mg XL daily and started on escitalopram 5 mg daily at last visit.  Wellbutrin was working well to control depression symptoms but was still struggling with over thinking, feeling overwhelmed easily, catastrophic thinking, racing thoughts, and intermittent panic attacks.  Symptoms have all resolved or improved since starting medication.  Continues with feeling  overwhelmed easily at times but has reduced in frequency and severity.  Has also been trying to push herself outside of her comfort zone and this has been anxiety provoking.  Coping mechanisms have been helpful as well.  Sleeping well most nights but daughter is up a lot at night and so this prohibits her from getting restful sleep.  Denies any AE of medication.  Denies SI/HI, AVH.  No other complaints today.    Triggers: stress, anxiety, financial issues, illness    Cravings: most night, mild for opioids. Denies meth cravings.    Relapse Prevention: Peer support, individual therapy, MOUD    Past Surgical History:  Past Surgical History:   Procedure Laterality Date   •  SECTION         Problem List:  There is no problem list on file for this patient.      Allergy:   Allergies   Allergen Reactions   • Amoxicillin Hives   • Penicillins Hives        Current Medications:   Current Outpatient Medications   Medication Sig Dispense Refill   • buPROPion XL (WELLBUTRIN XL) 150 MG 24 hr tablet Take 1 tablet by mouth Every Morning. 30 tablet 2   • escitalopram (Lexapro) 5 MG tablet Take 1 tablet by mouth Daily. 30 tablet 2   • docusate sodium (Colace) 100 MG capsule Take 1 capsule by mouth 2 (Two) Times a Day As Needed for Constipation. 60 capsule 2   • hydrOXYzine pamoate (Vistaril) 25 MG capsule Take 1 capsule by mouth 3 (Three) Times a Day As Needed for Anxiety. 90 capsule 0   • methadone (DOLOPHINE) 10 MG tablet Take 70 mg by mouth Daily,     • naloxone (NARCAN) 4 MG/0.1ML nasal spray 1 spray into the nostril(s) as directed by provider As Needed (use for oversedation and call 911). 2 each 2   • Nexplanon 68 MG implant subdermal implant      • polyethylene glycol (MIRALAX) 17 GM/SCOOP powder Take 17 g by mouth Daily. 510 g 2     No current facility-administered medications for this visit.       Past Medical History:  Past Medical History:   Diagnosis Date   • Anxiety    • Depression    • Panic disorder         Social History:  Social History     Socioeconomic History   • Marital status: Single   Tobacco Use   • Smoking status: Every Day     Packs/day: 1.00     Types: Cigarettes   • Smokeless tobacco: Never   Vaping Use   • Vaping Use: Some days   • Substances: Nicotine, Flavoring   • Devices: Disposable   Substance and Sexual Activity   • Alcohol use: Not Currently   • Drug use: Not Currently     Types: Methamphetamines, Codeine     Comment: States she has not used in 3 weeks   • Sexual activity: Defer       Family History:  Family History   Problem Relation Age of Onset   • Alcohol abuse Father    • Drug abuse Brother    • Alcohol abuse Brother          Subjective      Review of Systems:   Review of Systems   Constitutional: Positive for fatigue. Negative for chills and fever.   Respiratory: Negative for shortness of breath.    Cardiovascular: Negative for chest pain.   Gastrointestinal: Negative for abdominal pain.   Skin: Negative for skin lesions.   Neurological: Negative for seizures and confusion.   Psychiatric/Behavioral: Positive for sleep disturbance and stress. Negative for hallucinations, suicidal ideas and depressed mood. The patient is not nervous/anxious.      Patient History:   The following portions of the patient's history were reviewed and updated as appropriate: allergies, current medications, past family history, past medical history, past social history, past surgical history and problem list.     Social:  Social History     Socioeconomic History   • Marital status: Single   Tobacco Use   • Smoking status: Every Day     Packs/day: 1.00     Types: Cigarettes   • Smokeless tobacco: Never   Vaping Use   • Vaping Use: Some days   • Substances: Nicotine, Flavoring   • Devices: Disposable   Substance and Sexual Activity   • Alcohol use: Not Currently   • Drug use: Not Currently     Types: Methamphetamines, Codeine     Comment: States she has not used in 3 weeks   • Sexual activity: Defer        Medications:     Current Outpatient Medications:   •  buPROPion XL (WELLBUTRIN XL) 150 MG 24 hr tablet, Take 1 tablet by mouth Every Morning., Disp: 30 tablet, Rfl: 2  •  escitalopram (Lexapro) 5 MG tablet, Take 1 tablet by mouth Daily., Disp: 30 tablet, Rfl: 2  •  docusate sodium (Colace) 100 MG capsule, Take 1 capsule by mouth 2 (Two) Times a Day As Needed for Constipation., Disp: 60 capsule, Rfl: 2  •  hydrOXYzine pamoate (Vistaril) 25 MG capsule, Take 1 capsule by mouth 3 (Three) Times a Day As Needed for Anxiety., Disp: 90 capsule, Rfl: 0  •  methadone (DOLOPHINE) 10 MG tablet, Take 70 mg by mouth Daily,, Disp: , Rfl:   •  naloxone (NARCAN) 4 MG/0.1ML nasal spray, 1 spray into the nostril(s) as directed by provider As Needed (use for oversedation and call 911)., Disp: 2 each, Rfl: 2  •  Nexplanon 68 MG implant subdermal implant, , Disp: , Rfl:   •  polyethylene glycol (MIRALAX) 17 GM/SCOOP powder, Take 17 g by mouth Daily., Disp: 510 g, Rfl: 2    Objective     Physical Exam:  Physical Exam  Vitals reviewed.   Constitutional:       General: She is not in acute distress.     Appearance: She is well-developed. She is not ill-appearing.   Pulmonary:      Effort: No respiratory distress.   Neurological:      Mental Status: She is alert and oriented to person, place, and time.   Psychiatric:         Attention and Perception: Attention normal.         Mood and Affect: Mood and affect normal.         Speech: Speech normal.         Behavior: Behavior normal. Behavior is cooperative.         Thought Content: Thought content is not paranoid or delusional. Thought content does not include homicidal or suicidal ideation. Thought content does not include homicidal or suicidal plan.         Cognition and Memory: Cognition and memory normal.         Judgment: Judgment normal.         Vital Signs: There were no vitals filed for this visit.  There is no height or weight on file to calculate BMI.     Mental Status Exam:  reviewed 3/7/2023  Hygiene:   good  Cooperation:  Cooperative  Eye Contact:  Good  Psychomotor Behavior:  Appropriate  Affect:  Full range  Mood: normal  Speech:  Normal  Thought Process:  Goal directed  Thought Content:  Normal  Suicidal:  None  Homicidal:  None  Hallucinations:  None  Delusion:  None  Memory:  Intact  Orientation:  Person, Place, Time and Situation  Reliability:  good  Insight:  Good  Judgement:  Good  Impulse Control:  Good    Assessment / Plan    -Continue Wellbutrin 150 mg XL daily as it has been helpful for her depression.   -Continue escitalopram 5mg daily. Discussed AE and when to RTC/Er. No cardiac hx. She is aware of risk of QT prolongation with metadone and escitalopram and will discuss with [facility] if she decides to go up on methadone dosing.  -Advisement to take part in and remain active in 12 Step Recovery Meetings, IOP, and/or 1:1 therapy/counseling and to establish/maintain an active relationship with a recovery sponsor.   -Continue counseling    Assessment & Plan   Problems Addressed this Visit    None  Visit Diagnoses     Moderate episode of recurrent major depressive disorder (HCC)        Relevant Medications    buPROPion XL (WELLBUTRIN XL) 150 MG 24 hr tablet    escitalopram (Lexapro) 5 MG tablet    Generalized anxiety disorder        Relevant Medications    buPROPion XL (WELLBUTRIN XL) 150 MG 24 hr tablet    escitalopram (Lexapro) 5 MG tablet      Diagnoses       Codes Comments    Moderate episode of recurrent major depressive disorder (HCC)     ICD-10-CM: F33.1  ICD-9-CM: 296.32     Generalized anxiety disorder     ICD-10-CM: F41.1  ICD-9-CM: 300.02           Visit Diagnoses:    ICD-10-CM ICD-9-CM   1. Moderate episode of recurrent major depressive disorder (HCC)  F33.1 296.32   2. Generalized anxiety disorder  F41.1 300.02       PLAN:  1. Safety: No acute safety concerns  2. Risk Assessment: Risk of self-harm acutely is low. Risk of self-harm chronically is also low, but could be  further elevated in the event of treatment noncompliance and/or AODA.      TREATMENT PLAN: Continue supportive psychotherapy efforts and medications as indicated. Treatment and medication options discussed during today's visit. Patient acknowledged and verbally consented to continue with current treatment plan and was educated on the importance of compliance with treatment and follow-up appointments.    GOALS:  Short Term Goals: Patient will be compliant with medication, and patient will have no significant medication related side effects.  Patient will be engaged in psychotherapy as indicated.  Patient will report subjective improvement of symptoms.  Long term goals: To stabilize mood and treat/improve subjective symptoms, the patient will stay out of the hospital, the patient will be at an optimal level of functioning, and the patient will take all medications as prescribed.  The patient/guardian verbalized understanding and agreement with goals that were mutually set.      MEDICATION ISSUES:  RITO reviewed as expected.  Discussed medication options and treatment plan of prescribed medication as well as the risks, benefits, and side effects including potential falls, possible impaired driving and metabolic adversities among others. Patient is agreeable to call the office with any worsening of symptoms or onset of side effects. Patient is agreeable to call 911 or go to the nearest ER should he/she begin having SI/HI. No medication side effects or related complaints today.     MEDS ORDERED DURING VISIT:  New Medications Ordered This Visit   Medications   • buPROPion XL (WELLBUTRIN XL) 150 MG 24 hr tablet     Sig: Take 1 tablet by mouth Every Morning.     Dispense:  30 tablet     Refill:  2   • escitalopram (Lexapro) 5 MG tablet     Sig: Take 1 tablet by mouth Daily.     Dispense:  30 tablet     Refill:  2       Return in about 3 months (around 6/7/2023) for Follow Up Medication.           This document has been  electronically signed by YULI Lea  March 7, 2023 13:27 EST      Part of this note may be an electronic transcription/translation of spoken language to printed text using the Dragon Dictation System.

## 2023-06-13 ENCOUNTER — TELEMEDICINE (OUTPATIENT)
Dept: PSYCHIATRY | Facility: CLINIC | Age: 38
End: 2023-06-13
Payer: COMMERCIAL

## 2023-06-13 DIAGNOSIS — F41.1 GENERALIZED ANXIETY DISORDER: ICD-10-CM

## 2023-06-13 DIAGNOSIS — K59.03 CONSTIPATION DUE TO OPIOID THERAPY: ICD-10-CM

## 2023-06-13 DIAGNOSIS — F33.1 MODERATE EPISODE OF RECURRENT MAJOR DEPRESSIVE DISORDER: ICD-10-CM

## 2023-06-13 DIAGNOSIS — T40.2X5A CONSTIPATION DUE TO OPIOID THERAPY: ICD-10-CM

## 2023-06-13 RX ORDER — DOCUSATE SODIUM 100 MG/1
100 CAPSULE, LIQUID FILLED ORAL 2 TIMES DAILY PRN
Qty: 60 CAPSULE | Refills: 0 | Status: SHIPPED | OUTPATIENT
Start: 2023-06-13

## 2023-06-13 RX ORDER — ESCITALOPRAM OXALATE 5 MG/1
5 TABLET ORAL DAILY
Qty: 30 TABLET | Refills: 2 | Status: SHIPPED | OUTPATIENT
Start: 2023-06-13

## 2023-06-13 RX ORDER — BUPROPION HYDROCHLORIDE 150 MG/1
150 TABLET ORAL EVERY MORNING
Qty: 30 TABLET | Refills: 2 | Status: SHIPPED | OUTPATIENT
Start: 2023-06-13

## 2023-06-13 NOTE — PROGRESS NOTES
Telehealth Visit      This provider is located at [facility], Behavioral Health, [address] [facility] in [place], [place],using a secure [employer] Video Visit through [employer]. Patient is being seen remotely via telehealth at their home address in [place], and stated they are in a secure environment for this session. The patient's condition being diagnosed/treated is appropriate for telemedicine. The provider identified herself as well as her credentials. The patient, and/or patients guardian, consent to be seen remotely, and when consent is given they understand that the consent allows for patient identifiable information to be sent to a third party as needed. They may refuse to be seen remotely at any time. The electronic data is encrypted and password protected, and the patient and/or guardian has been advised of the potential risks to privacy not withstanding such measures.    Patient Name: Ada Vogt  : 1985   MRN: [id number]     Referring Provider: Provider, No Known    Chief Complaint: Follow up on substance use and mood    History of Present Illness:   Ada Vogt is a 38 y.o. female who is here today for follow up related to CHARLY and mood.  She is happy to report 15 months of sobriety today. Methadone increased to 75mg daily by [facility] and she is feeling much better on that dose. They did not feel EKG was warranted.  Continues on Wellbutrin 150 mg XL daily and escitalopram 5 mg daily and is tolerating well.  Feels depressive and anxiety symptoms have resolved.  Currently seeing therapist at [facility] and was talking to her about poor concentration, difficulty starting and completing tasks, and being disorganized.  Therapist is referring her to psych for ADHD evaluation.  Has not been going to any recovery meetings but spends time with sober support regularly.  Continues to report she struggles with sleep but this is related to her daughter getting up a lot at night.   No longer using Vistaril. Denies any AE of psychiatric medication.  Methadone still causing constipation and request refill of docusate as she has not been able to get into her PCP.  Denies SI/HI, AVH.  No other complaints today.    Past Surgical History:  Past Surgical History:   Procedure Laterality Date   •  SECTION         Problem List:  There is no problem list on file for this patient.      Allergy:   Allergies   Allergen Reactions   • Amoxicillin Hives   • Penicillins Hives        Current Medications:   Current Outpatient Medications   Medication Sig Dispense Refill   • buPROPion XL (WELLBUTRIN XL) 150 MG 24 hr tablet Take 1 tablet by mouth Every Morning. 30 tablet 2   • docusate sodium (Colace) 100 MG capsule Take 1 capsule by mouth 2 (Two) Times a Day As Needed for Constipation. 60 capsule 0   • escitalopram (Lexapro) 5 MG tablet Take 1 tablet by mouth Daily. 30 tablet 2   • methadone (DOLOPHINE) 10 MG tablet Take 7.5 tablets by mouth Daily,     • naloxone (NARCAN) 4 MG/0.1ML nasal spray 1 spray into the nostril(s) as directed by provider As Needed (use for oversedation and call 911). 2 each 2   • Nexplanon 68 MG implant subdermal implant      • polyethylene glycol (MIRALAX) 17 GM/SCOOP powder Take 17 g by mouth Daily. 510 g 2     No current facility-administered medications for this visit.       Past Medical History:  Past Medical History:   Diagnosis Date   • Anxiety    • Depression    • Panic disorder        Social History:  Social History     Socioeconomic History   • Marital status: Single   Tobacco Use   • Smoking status: Every Day     Packs/day: 1.00     Types: Cigarettes   • Smokeless tobacco: Never   Vaping Use   • Vaping Use: Some days   • Substances: Nicotine, Flavoring   • Devices: Disposable   Substance and Sexual Activity   • Alcohol use: Not Currently   • Drug use: Not Currently     Types: Methamphetamines, Codeine     Comment: States she has not used in 3 weeks   • Sexual  activity: Defer       Family History:  Family History   Problem Relation Age of Onset   • Alcohol abuse Father    • Drug abuse Brother    • Alcohol abuse Brother          Subjective      Review of Systems:   Review of Systems   Constitutional: Positive for fatigue. Negative for chills and fever.   Respiratory: Negative for shortness of breath.    Cardiovascular: Negative for chest pain.   Gastrointestinal: Negative for abdominal pain.   Skin: Negative for skin lesions.   Neurological: Negative for seizures and confusion.   Psychiatric/Behavioral: Positive for sleep disturbance and stress. Negative for hallucinations, suicidal ideas and depressed mood. The patient is not nervous/anxious.      Patient History:   The following portions of the patient's history were reviewed and updated as appropriate: allergies, current medications, past family history, past medical history, past social history, past surgical history and problem list.     Social:  Social History     Socioeconomic History   • Marital status: Single   Tobacco Use   • Smoking status: Every Day     Packs/day: 1.00     Types: Cigarettes   • Smokeless tobacco: Never   Vaping Use   • Vaping Use: Some days   • Substances: Nicotine, Flavoring   • Devices: Disposable   Substance and Sexual Activity   • Alcohol use: Not Currently   • Drug use: Not Currently     Types: Methamphetamines, Codeine     Comment: States she has not used in 3 weeks   • Sexual activity: Defer       Medications:     Current Outpatient Medications:   •  buPROPion XL (WELLBUTRIN XL) 150 MG 24 hr tablet, Take 1 tablet by mouth Every Morning., Disp: 30 tablet, Rfl: 2  •  docusate sodium (Colace) 100 MG capsule, Take 1 capsule by mouth 2 (Two) Times a Day As Needed for Constipation., Disp: 60 capsule, Rfl: 0  •  escitalopram (Lexapro) 5 MG tablet, Take 1 tablet by mouth Daily., Disp: 30 tablet, Rfl: 2  •  methadone (DOLOPHINE) 10 MG tablet, Take 7.5 tablets by mouth Daily,, Disp: , Rfl:   •   naloxone (NARCAN) 4 MG/0.1ML nasal spray, 1 spray into the nostril(s) as directed by provider As Needed (use for oversedation and call 911)., Disp: 2 each, Rfl: 2  •  Nexplanon 68 MG implant subdermal implant, , Disp: , Rfl:   •  polyethylene glycol (MIRALAX) 17 GM/SCOOP powder, Take 17 g by mouth Daily., Disp: 510 g, Rfl: 2    Objective     Physical Exam:  Physical Exam  Vitals reviewed.   Constitutional:       General: She is not in acute distress.     Appearance: She is well-developed. She is not ill-appearing.   Pulmonary:      Effort: No respiratory distress.   Neurological:      Mental Status: She is alert and oriented to person, place, and time.   Psychiatric:         Attention and Perception: Attention normal.         Mood and Affect: Mood and affect normal.         Speech: Speech normal.         Behavior: Behavior normal. Behavior is cooperative.         Thought Content: Thought content is not paranoid or delusional. Thought content does not include homicidal or suicidal ideation. Thought content does not include homicidal or suicidal plan.         Cognition and Memory: Cognition and memory normal.         Judgment: Judgment normal.         Vital Signs: There were no vitals filed for this visit.  There is no height or weight on file to calculate BMI.     Mental Status Exam: Reviewed 6/13/2023  Hygiene:   good  Cooperation:  Cooperative  Eye Contact:  Good  Psychomotor Behavior:  Appropriate  Affect:  Full range  Mood: normal  Speech:  Normal  Thought Process:  Goal directed  Thought Content:  Normal  Suicidal:  None  Homicidal:  None  Hallucinations:  None  Delusion:  None  Memory:  Intact  Orientation:  Person, Place, Time and Situation  Reliability:  good  Insight:  Good  Judgement:  Good  Impulse Control:  Good    Assessment / Plan    -Continue Wellbutrin 150 mg XL daily for depression  -Continue escitalopram 5 mg daily for depression and anxiety  -Will discontinue Vistaril as she has not taken in quite  some time  -Patient is transferring care to [facility].  -Advisement to take part in and remain active in 12 Step Recovery Meetings, IOP, and/or 1:1 therapy/counseling and to establish/maintain an active relationship with a recovery sponsor.  More active engagement in her recovery encouraged  -Continue docusate 100 mg twice daily as needed.  He is to follow-up with PCP for any further refills.    Assessment & Plan   Problems Addressed this Visit    None  Visit Diagnoses     Moderate episode of recurrent major depressive disorder        Relevant Medications    buPROPion XL (WELLBUTRIN XL) 150 MG 24 hr tablet    escitalopram (Lexapro) 5 MG tablet    Generalized anxiety disorder        Relevant Medications    buPROPion XL (WELLBUTRIN XL) 150 MG 24 hr tablet    escitalopram (Lexapro) 5 MG tablet    Constipation due to opioid therapy        Relevant Medications    docusate sodium (Colace) 100 MG capsule      Diagnoses       Codes Comments    Moderate episode of recurrent major depressive disorder     ICD-10-CM: F33.1  ICD-9-CM: 296.32     Generalized anxiety disorder     ICD-10-CM: F41.1  ICD-9-CM: 300.02     Constipation due to opioid therapy     ICD-10-CM: K59.03, T40.2X5A  ICD-9-CM: 564.09, E935.2           Visit Diagnoses:    ICD-10-CM ICD-9-CM   1. Moderate episode of recurrent major depressive disorder  F33.1 296.32   2. Generalized anxiety disorder  F41.1 300.02   3. Constipation due to opioid therapy  K59.03 564.09    T40.2X5A E935.2       PLAN:  1. Safety: No acute safety concerns  2. Risk Assessment: Risk of self-harm acutely is low. Risk of self-harm chronically is also low, but could be further elevated in the event of treatment noncompliance and/or AODA.      TREATMENT PLAN: Continue supportive psychotherapy efforts and medications as indicated. Treatment and medication options discussed during today's visit. Patient acknowledged and verbally consented to continue with current treatment plan and was educated  on the importance of compliance with treatment and follow-up appointments.    GOALS:  Short Term Goals: Patient will be compliant with medication, and patient will have no significant medication related side effects.  Patient will be engaged in psychotherapy as indicated.  Patient will report subjective improvement of symptoms.  Long term goals: To stabilize mood and treat/improve subjective symptoms, the patient will stay out of the hospital, the patient will be at an optimal level of functioning, and the patient will take all medications as prescribed.  The patient/guardian verbalized understanding and agreement with goals that were mutually set.      MEDICATION ISSUES:  RITO reviewed as expected.  Discussed medication options and treatment plan of prescribed medication as well as the risks, benefits, and side effects including potential falls, possible impaired driving and metabolic adversities among others. Patient is agreeable to call the office with any worsening of symptoms or onset of side effects. Patient is agreeable to call 911 or go to the nearest ER should he/she begin having SI/HI. No medication side effects or related complaints today.     MEDS ORDERED DURING VISIT:  New Medications Ordered This Visit   Medications   • buPROPion XL (WELLBUTRIN XL) 150 MG 24 hr tablet     Sig: Take 1 tablet by mouth Every Morning.     Dispense:  30 tablet     Refill:  2   • escitalopram (Lexapro) 5 MG tablet     Sig: Take 1 tablet by mouth Daily.     Dispense:  30 tablet     Refill:  2   • docusate sodium (Colace) 100 MG capsule     Sig: Take 1 capsule by mouth 2 (Two) Times a Day As Needed for Constipation.     Dispense:  60 capsule     Refill:  0       Return if symptoms worsen or fail to improve.           This document has been electronically signed by YULI Lea  June 13, 2023 13:02 EDT      Part of this note may be an electronic transcription/translation of spoken language to printed text using the  [facility] Dictation System.

## 2023-06-14 DIAGNOSIS — F41.1 GENERALIZED ANXIETY DISORDER: ICD-10-CM

## 2023-06-14 DIAGNOSIS — F33.1 MODERATE EPISODE OF RECURRENT MAJOR DEPRESSIVE DISORDER: ICD-10-CM

## 2023-06-14 RX ORDER — BUPROPION HYDROCHLORIDE 150 MG/1
150 TABLET ORAL EVERY MORNING
Qty: 30 TABLET | Refills: 2 | OUTPATIENT
Start: 2023-06-14

## 2023-06-14 RX ORDER — ESCITALOPRAM OXALATE 5 MG/1
5 TABLET ORAL DAILY
Qty: 30 TABLET | Refills: 2 | OUTPATIENT
Start: 2023-06-14

## 2023-11-22 NOTE — PROGRESS NOTES
CD IOP GROUP     Date: 08/22/2022  Name: Ada Vogt    Time In: 1530  Time Out: 1630    This provider is located at [facility] located at [address] [place] [address], [address] [place] [id number].  The Patient is seen remotely at his/her home, using Zoom. Patient is being seen via telehealth and confirm that they are in a secure environment for this session. The patient's condition being diagnosed/treated is appropriate for telemedicine. The provider identified himself as well as his credentials. The patient gave consent to be seen remotely, and when consent is given they understand that the consent allows for patient identifiable information to be sent to a third party as needed. They may refuse to be seen remotely at any time. The electronic data is encrypted and password protected, and the patient has been advised of the potential risks to privacy not withstanding such measures.      Number of participants: 10    IOP GROUP NOTE     Data: 1 hour IOP group therapy session (Check-ins, Coping Skills, Relapse Prevention)     Check Ins: Therapist continued facilitation of rapport building strategies between group members. Therapist asked that each patient check in with home life and recovery efforts and identify triggers, cravings, and high risk situations that arise between group sessions. Therapist provided empathy and support during group session.     Session Content/Coping Skills: Check ins completed by group members. Clinician shared Chronic Pain Support Group resource with group members. Group members identified short term and long term goals. Clinician educated group members on SMART goals and group members practiced setting SMART goals. Clinician invited group members to participate in Overdose Awareness Day.     Response: Patient attended class via Zoom. Patient participated in verbal completion of check in form. Patient reported during check in that she [place] [facility] [place] [facility]. Patient during  "check in answered no to question \"currently or since last group meeting have you had any suicidal thoughts or plan or intent to hurt yourself”, and patient also answered no to question of \"currently or since your last group meeting, have you had any homicidal thoughts or plan or intent to hurt others\". Patient participated in group discussions.      Personal Assessment 0-10 Scale (10 worst)    Anxiety:  8   Depression:  1   Cravings: 2     Assessment:     ..  Admission on 08/21/2022, Discharged on 08/21/2022   Component Date Value Ref Range Status   • Glucose 08/21/2022 82  65 - 99 mg/dL Final   • BUN 08/21/2022 5 (A) 6 - 20 mg/dL Final   • Creatinine 08/21/2022 0.49 (A) 0.57 - 1.00 mg/dL Final   • Sodium 08/21/2022 137  136 - 145 mmol/L Final   • Potassium 08/21/2022 3.5  3.5 - 5.2 mmol/L Final   • Chloride 08/21/2022 99  98 - 107 mmol/L Final   • CO2 08/21/2022 28.8  22.0 - 29.0 mmol/L Final   • Calcium 08/21/2022 8.7  8.6 - 10.5 mg/dL Final   • Total Protein 08/21/2022 6.2  6.0 - 8.5 g/dL Final   • Albumin 08/21/2022 3.60  3.50 - 5.20 g/dL Final   • ALT (SGPT) 08/21/2022 20  1 - 33 U/L Final   • AST (SGOT) 08/21/2022 24  1 - 32 U/L Final   • Alkaline Phosphatase 08/21/2022 102  39 - 117 U/L Final   • Total Bilirubin 08/21/2022 0.4  0.0 - 1.2 mg/dL Final   • Globulin 08/21/2022 2.6  gm/dL Final   • A/G Ratio 08/21/2022 1.4  g/dL Final   • BUN/Creatinine Ratio 08/21/2022 10.2  7.0 - 25.0 Final   • Anion Gap 08/21/2022 9.2  5.0 - 15.0 mmol/L Final   • eGFR 08/21/2022 126.2  >60.0 mL/min/1.73 Final    National Kidney Foundation and American Society of Nephrology (ASN) Task Force recommended calculation based on the Chronic Kidney Disease Epidemiology Collaboration (CKD-EPI) equation refit without adjustment for race.   • Procalcitonin 08/21/2022 2.30 (A) 0.00 - 0.25 ng/mL Final   • Lactate 08/21/2022 1.2  0.5 - 2.0 mmol/L Final   • Ferritin 08/21/2022 220.40 (A) 13.00 - 150.00 ng/mL Final   • Fibrinogen 08/21/2022 " 705 (A) 209 - 518 mg/dL Final   • C-Reactive Protein 08/21/2022 16.65 (A) 0.00 - 0.50 mg/dL Final   • Troponin T 08/21/2022 <0.010  0.000 - 0.030 ng/mL Final   • WBC 08/21/2022 13.53 (A) 3.40 - 10.80 10*3/mm3 Final   • RBC 08/21/2022 4.29  3.77 - 5.28 10*6/mm3 Final   • Hemoglobin 08/21/2022 12.4  12.0 - 15.9 g/dL Final   • Hematocrit 08/21/2022 36.7  34.0 - 46.6 % Final   • MCV 08/21/2022 85.5  79.0 - 97.0 fL Final   • MCH 08/21/2022 28.9  26.6 - 33.0 pg Final   • MCHC 08/21/2022 33.8  31.5 - 35.7 g/dL Final   • RDW 08/21/2022 13.1  12.3 - 15.4 % Final   • RDW-SD 08/21/2022 40.4  37.0 - 54.0 fl Final   • MPV 08/21/2022 9.1  6.0 - 12.0 fL Final   • Platelets 08/21/2022 363  140 - 450 10*3/mm3 Final   • Neutrophil % 08/21/2022 83.9 (A) 42.7 - 76.0 % Final   • Lymphocyte % 08/21/2022 10.0 (A) 19.6 - 45.3 % Final   • Monocyte % 08/21/2022 5.2  5.0 - 12.0 % Final   • Eosinophil % 08/21/2022 0.4  0.3 - 6.2 % Final   • Basophil % 08/21/2022 0.3  0.0 - 1.5 % Final   • Immature Grans % 08/21/2022 0.2  0.0 - 0.5 % Final   • Neutrophils, Absolute 08/21/2022 11.35 (A) 1.70 - 7.00 10*3/mm3 Final   • Lymphocytes, Absolute 08/21/2022 1.35  0.70 - 3.10 10*3/mm3 Final   • Monocytes, Absolute 08/21/2022 0.71  0.10 - 0.90 10*3/mm3 Final   • Eosinophils, Absolute 08/21/2022 0.05  0.00 - 0.40 10*3/mm3 Final   • Basophils, Absolute 08/21/2022 0.04  0.00 - 0.20 10*3/mm3 Final   • Immature Grans, Absolute 08/21/2022 0.03  0.00 - 0.05 10*3/mm3 Final   • nRBC 08/21/2022 0.0  0.0 - 0.2 /100 WBC Final   • COVID19 08/21/2022 Not Detected  Not Detected - Ref. Range Final   Lab on 08/19/2022   Component Date Value Ref Range Status   • Ethanol, Urine 08/19/2022 Negative  Cutoff=0.020 % Final   • THC, Screen, Urine 08/19/2022 Negative  Negative Final   • Phencyclidine (PCP), Urine 08/19/2022 Negative  Negative Final   • Cocaine Screen, Urine 08/19/2022 Negative  Negative Final   • Methamphetamine, Ur 08/19/2022 Negative  Negative Final   • Opiate  Screen 08/19/2022 Negative  Negative Final   • Amphetamine Screen, Urine 08/19/2022 Negative  Negative Final   • Benzodiazepine Screen, Urine 08/19/2022 Negative  Negative Final   • Tricyclic Antidepressants Screen 08/19/2022 Negative  Negative Final   • Methadone Screen, Urine 08/19/2022 Positive (A) Negative Final   • Barbiturates Screen, Urine 08/19/2022 Negative  Negative Final   • Oxycodone Screen, Urine 08/19/2022 Negative  Negative Final   • Propoxyphene Screen 08/19/2022 Negative  Negative Final   • Buprenorphine, Screen, Urine 08/19/2022 Negative  Negative Final   Lab on 08/10/2022   Component Date Value Ref Range Status   • Ethanol, Urine 08/10/2022 Negative  Cutoff=0.020 % Final   • THC, Screen, Urine 08/10/2022 Negative  Negative Final   • Phencyclidine (PCP), Urine 08/10/2022 Negative  Negative Final   • Cocaine Screen, Urine 08/10/2022 Negative  Negative Final   • Methamphetamine, Ur 08/10/2022 Negative  Negative Final   • Opiate Screen 08/10/2022 Negative  Negative Final   • Amphetamine Screen, Urine 08/10/2022 Negative  Negative Final   • Benzodiazepine Screen, Urine 08/10/2022 Negative  Negative Final   • Tricyclic Antidepressants Screen 08/10/2022 Negative  Negative Final   • Methadone Screen, Urine 08/10/2022 Positive (A) Negative Final   • Barbiturates Screen, Urine 08/10/2022 Negative  Negative Final   • Oxycodone Screen, Urine 08/10/2022 Negative  Negative Final   • Propoxyphene Screen 08/10/2022 Negative  Negative Final   • Buprenorphine, Screen, Urine 08/10/2022 Negative  Negative Final   Lab on 08/03/2022   Component Date Value Ref Range Status   • Ethanol, Urine 08/03/2022 Negative  Cutoff=0.020 % Final   • THC, Screen, Urine 08/03/2022 Negative  Negative Final   • Phencyclidine (PCP), Urine 08/03/2022 Negative  Negative Final   • Cocaine Screen, Urine 08/03/2022 Negative  Negative Final   • Methamphetamine, Ur 08/03/2022 Negative  Negative Final   • Opiate Screen 08/03/2022 Negative   Negative Final   • Amphetamine Screen, Urine 08/03/2022 Negative  Negative Final   • Benzodiazepine Screen, Urine 08/03/2022 Negative  Negative Final   • Tricyclic Antidepressants Screen 08/03/2022 Negative  Negative Final   • Methadone Screen, Urine 08/03/2022 Positive (A) Negative Final   • Barbiturates Screen, Urine 08/03/2022 Negative  Negative Final   • Oxycodone Screen, Urine 08/03/2022 Negative  Negative Final   • Propoxyphene Screen 08/03/2022 Negative  Negative Final   • Buprenorphine, Screen, Urine 08/03/2022 Negative  Negative Final   • Methadone 08/03/2022 +POSITIVE+   Final   • Methadone, Quantitative 08/03/2022 >6329  ng/mg creat Final   • EDDP 08/03/2022 >6329  ng/mg creat Final   • Level of Detection: 08/03/2022 Comment   Final    Testing Threshold:  50 ng/mL                                                                       '  This test was developed and its performance characteristics  determined by [employer].  It has not been cleared or approved  by the Food and Drug Administration.       Mental Status Exam  Hygiene:  good  Dress: casual  Attitude: cooperative and agreeable   Motor Activity: appropriate  Eye Contact:  good  Speech: regular rate and rhythm   Mood:  calm and cooperative  Affect:  Appropriate  Thought Processes:  Linear  Thought Content:  Normal  Suicidal Thoughts:  denies  Homicidal Thoughts:  denies  Crisis Safety Plan: Safety plan has been discussed.   Hallucinations: Unknown to clinician.   Reliability: fair  Insight: fair  Judgement: fair  Impulse Control: fair    Recovery/spiritual support group attendance: Patient reported during check in that she [place] [facility] [place].      Progress toward goal: Not at goal    Prognosis: Fair with Ongoing Treatment     Self-reported number of days sober: Patient reported 150 days during check in.     Patient agreeable to adhere to medication regimen as prescribed and report any side effects. Patient will contact this office, call  [phone] or present to the nearest emergency room should suicidal or homicidal ideations occur.    Impression/Formulation:  No diagnosis found.    Clinical Maneuvering/Interventions: Therapist utilized a person-centered approach to build rapport with group member. Therapist implemented motivational interviewing techniques to assist client with exploring and resolving ambivalence associated with commitment to change behaviors related to substance use and addiction. Therapist applied cognitive behavioral strategies to facilitate identification of maladaptive patterns of thinking and behavior that contribute to client's risk for continued substance use and relapse. Therapist employed group interaction activities to build rapport among group members, promote sobriety, and emphasize relapse prevention. Therapist promoted safe nonjudgmental environment by providing group members with unconditional positive regard and encouraging group members to comply with group rules and guidelines. Therapist assisted group member with identifying and implementing healthier coping strategies.      Plan:  Continue [facility] IOP Phase II  Aftercare:  [facility] Phase III  Program Assignments:  Personal recovery plan, relapse prevention plan, attendance of recovery support group meetings, exploration of sponsorship, drug/alcohol screens.     Amor Tabares LCSW  8/25/2022  09:21 EDT   unknown 31-Mar-2018 23:59